# Patient Record
Sex: FEMALE | Race: WHITE | NOT HISPANIC OR LATINO | Employment: FULL TIME | URBAN - NONMETROPOLITAN AREA
[De-identification: names, ages, dates, MRNs, and addresses within clinical notes are randomized per-mention and may not be internally consistent; named-entity substitution may affect disease eponyms.]

---

## 2017-01-17 ENCOUNTER — LAB (OUTPATIENT)
Dept: ONCOLOGY | Facility: CLINIC | Age: 47
End: 2017-01-17

## 2017-01-17 DIAGNOSIS — C53.9 MALIGNANT NEOPLASM OF CERVIX, UNSPECIFIED SITE (HCC): Primary | ICD-10-CM

## 2017-01-17 LAB
ALBUMIN SERPL-MCNC: 4.7 G/DL (ref 3.5–5)
ALBUMIN/GLOB SERPL: 1.3 G/DL
ALP SERPL-CCNC: 61 U/L (ref 38–126)
ALT SERPL W P-5'-P-CCNC: 23 U/L (ref 9–52)
ANION GAP SERPL CALCULATED.3IONS-SCNC: 14 MMOL/L
AST SERPL-CCNC: 16 U/L (ref 5–40)
AUTO MIXED CELLS #: 0.3 10*3/UL (ref 0.1–1.5)
AUTO MIXED CELLS %: 6.1 % (ref 0.2–15.1)
BILIRUB SERPL-MCNC: 0.6 MG/DL (ref 0.2–1.3)
BUN BLD-MCNC: 14 MG/DL (ref 7–26)
BUN/CREAT SERPL: 15.6 (ref 7–25)
CALCIUM SPEC-SCNC: 10.5 MG/DL (ref 8.4–10.2)
CHLORIDE SERPL-SCNC: 105 MMOL/L (ref 98–107)
CO2 SERPL-SCNC: 26 MMOL/L (ref 22–30)
CREAT BLD-MCNC: 0.9 MG/DL (ref 0.7–1.4)
ERYTHROCYTE [DISTWIDTH] IN BLOOD BY AUTOMATED COUNT: 13.1 % (ref 11.5–14.5)
GFR SERPL CREATININE-BSD FRML MDRD: 68 ML/MIN/1.73
GLOBULIN UR ELPH-MCNC: 3.5 GM/DL
GLUCOSE BLD-MCNC: 100 MG/DL (ref 75–110)
HCT VFR BLD AUTO: 39.7 % (ref 37–47)
HGB BLD-MCNC: 12.2 G/DL (ref 12–16)
LYMPHOCYTES # BLD AUTO: 1.2 10*3/MM3 (ref 0.8–7)
LYMPHOCYTES NFR BLD AUTO: 29.3 % (ref 10–58.5)
MCH RBC QN AUTO: 29 PG (ref 27–31)
MCHC RBC AUTO-ENTMCNC: 30.7 G/DL (ref 33–37)
MCV RBC AUTO: 94.2 FL (ref 81–99)
NEUTROPHILS # BLD AUTO: 2.6 10*3/MM3 (ref 2–7.8)
NEUTROPHILS NFR BLD AUTO: 64.6 % (ref 37–92)
PLATELET # BLD AUTO: 187 10*3/MM3 (ref 130–400)
PMV BLD AUTO: 8.1 FL (ref 6–12)
POTASSIUM BLD-SCNC: 4.1 MMOL/L (ref 3.6–5)
PROT SERPL-MCNC: 8.2 G/DL (ref 6.3–8.2)
RBC # BLD AUTO: 4.21 10*6/MM3 (ref 4.2–5.4)
SODIUM BLD-SCNC: 145 MMOL/L (ref 137–145)
WBC NRBC COR # BLD: 4.1 10*3/MM3 (ref 4.8–10.8)

## 2017-01-17 PROCEDURE — 85025 COMPLETE CBC W/AUTO DIFF WBC: CPT | Performed by: INTERNAL MEDICINE

## 2017-01-17 PROCEDURE — 36415 COLL VENOUS BLD VENIPUNCTURE: CPT | Performed by: INTERNAL MEDICINE

## 2017-01-17 PROCEDURE — 80053 COMPREHEN METABOLIC PANEL: CPT | Performed by: INTERNAL MEDICINE

## 2017-03-03 ENCOUNTER — OFFICE VISIT (OUTPATIENT)
Dept: RADIATION ONCOLOGY | Facility: HOSPITAL | Age: 47
End: 2017-03-03

## 2017-03-03 VITALS
DIASTOLIC BLOOD PRESSURE: 76 MMHG | HEIGHT: 68 IN | WEIGHT: 180 LBS | SYSTOLIC BLOOD PRESSURE: 126 MMHG | BODY MASS INDEX: 27.28 KG/M2

## 2017-03-03 DIAGNOSIS — C53.9 MALIGNANT NEOPLASM OF CERVIX, UNSPECIFIED SITE (HCC): Primary | ICD-10-CM

## 2017-03-03 DIAGNOSIS — Z87.891 FORMER SMOKER: ICD-10-CM

## 2017-03-03 DIAGNOSIS — Z92.3 HISTORY OF RADIATION THERAPY: ICD-10-CM

## 2017-03-03 PROCEDURE — G0123 SCREEN CERV/VAG THIN LAYER: HCPCS | Performed by: PHYSICIAN ASSISTANT

## 2017-03-03 RX ORDER — TRAZODONE HYDROCHLORIDE 150 MG/1
50 TABLET ORAL NIGHTLY
COMMUNITY

## 2017-03-06 LAB
GEN CATEG CVX/VAG CYTO-IMP: NORMAL
LAB AP CASE REPORT: NORMAL
LAB AP GYN ADDITIONAL INFORMATION: NORMAL
Lab: NORMAL
PATH INTERP SPEC-IMP: NORMAL
STAT OF ADQ CVX/VAG CYTO-IMP: NORMAL

## 2017-03-13 ENCOUNTER — OFFICE VISIT (OUTPATIENT)
Dept: PRIMARY CARE CLINIC | Age: 47
End: 2017-03-13
Payer: COMMERCIAL

## 2017-03-13 VITALS
WEIGHT: 177 LBS | HEART RATE: 71 BPM | OXYGEN SATURATION: 94 % | DIASTOLIC BLOOD PRESSURE: 74 MMHG | TEMPERATURE: 99 F | RESPIRATION RATE: 18 BRPM | SYSTOLIC BLOOD PRESSURE: 112 MMHG | BODY MASS INDEX: 26.83 KG/M2 | HEIGHT: 68 IN

## 2017-03-13 DIAGNOSIS — G62.9 NEUROPATHY: ICD-10-CM

## 2017-03-13 DIAGNOSIS — Z92.21 HISTORY OF CHEMOTHERAPY: Primary | ICD-10-CM

## 2017-03-13 DIAGNOSIS — R53.83 FATIGUE, UNSPECIFIED TYPE: ICD-10-CM

## 2017-03-13 DIAGNOSIS — R63.5 WEIGHT GAIN: ICD-10-CM

## 2017-03-13 DIAGNOSIS — Z85.41 HISTORY OF CERVICAL CANCER: ICD-10-CM

## 2017-03-13 DIAGNOSIS — Z92.3 HISTORY OF RADIATION THERAPY: ICD-10-CM

## 2017-03-13 LAB
ALBUMIN SERPL-MCNC: 4.8 G/DL (ref 3.5–5.2)
ALP BLD-CCNC: 59 U/L (ref 35–104)
ALT SERPL-CCNC: 14 U/L (ref 5–33)
ANION GAP SERPL CALCULATED.3IONS-SCNC: 15 MMOL/L (ref 7–19)
AST SERPL-CCNC: 13 U/L (ref 5–32)
BASOPHILS ABSOLUTE: 0 K/UL (ref 0–0.2)
BASOPHILS RELATIVE PERCENT: 0.2 % (ref 0–1)
BILIRUB SERPL-MCNC: 0.6 MG/DL (ref 0.2–1.2)
BUN BLDV-MCNC: 15 MG/DL (ref 6–20)
CALCIUM SERPL-MCNC: 10.1 MG/DL (ref 8.6–10)
CHLORIDE BLD-SCNC: 99 MMOL/L (ref 98–111)
CO2: 25 MMOL/L (ref 22–29)
CREAT SERPL-MCNC: 1 MG/DL (ref 0.5–0.9)
EOSINOPHILS ABSOLUTE: 0.2 K/UL (ref 0–0.6)
EOSINOPHILS RELATIVE PERCENT: 3.6 % (ref 0–5)
GFR NON-AFRICAN AMERICAN: >60
GLOBULIN: 2.7 G/DL
GLUCOSE BLD-MCNC: 95 MG/DL (ref 74–109)
HCT VFR BLD CALC: 37.5 % (ref 37–47)
HEMOGLOBIN: 12.6 G/DL (ref 12–16)
LYMPHOCYTES ABSOLUTE: 1.3 K/UL (ref 1.1–4.5)
LYMPHOCYTES RELATIVE PERCENT: 28.8 % (ref 20–40)
MCH RBC QN AUTO: 31.1 PG (ref 27–31)
MCHC RBC AUTO-ENTMCNC: 33.6 G/DL (ref 33–37)
MCV RBC AUTO: 92.6 FL (ref 81–99)
MONOCYTES ABSOLUTE: 0.3 K/UL (ref 0–0.9)
MONOCYTES RELATIVE PERCENT: 6.6 % (ref 0–10)
NEUTROPHILS ABSOLUTE: 2.7 K/UL (ref 1.5–7.5)
NEUTROPHILS RELATIVE PERCENT: 60.8 % (ref 50–65)
PDW BLD-RTO: 13 % (ref 11.5–14.5)
PLATELET # BLD: 202 K/UL (ref 130–400)
PMV BLD AUTO: 9.6 FL (ref 7.4–10.4)
POTASSIUM SERPL-SCNC: 4.4 MMOL/L (ref 3.5–5)
RBC # BLD: 4.05 M/UL (ref 4.2–5.4)
SODIUM BLD-SCNC: 139 MMOL/L (ref 136–145)
TOTAL PROTEIN: 7.5 G/DL (ref 6.6–8.7)
TSH SERPL DL<=0.05 MIU/L-ACNC: 0.69 UIU/ML (ref 0.27–4.2)
VITAMIN B-12: 333 PG/ML (ref 211–946)
VITAMIN D 25-HYDROXY: 26.1 NG/ML
WBC # BLD: 4.4 K/UL (ref 4.8–10.8)

## 2017-03-13 PROCEDURE — 36415 COLL VENOUS BLD VENIPUNCTURE: CPT | Performed by: FAMILY MEDICINE

## 2017-03-13 PROCEDURE — 99214 OFFICE O/P EST MOD 30 MIN: CPT | Performed by: FAMILY MEDICINE

## 2017-03-15 RX ORDER — ERGOCALCIFEROL (VITAMIN D2) 1250 MCG
50000 CAPSULE ORAL WEEKLY
Qty: 4 CAPSULE | Refills: 3 | Status: SHIPPED | OUTPATIENT
Start: 2017-03-15

## 2017-03-15 ASSESSMENT — ENCOUNTER SYMPTOMS
BACK PAIN: 0
WHEEZING: 0
EYE DISCHARGE: 0
NAUSEA: 0
COUGH: 0
DIARRHEA: 0
ABDOMINAL PAIN: 0
COLOR CHANGE: 0
VOMITING: 0

## 2017-09-20 ENCOUNTER — HOSPITAL ENCOUNTER (OUTPATIENT)
Dept: RADIATION ONCOLOGY | Facility: HOSPITAL | Age: 47
Discharge: HOME | End: 2017-09-20
Attending: RADIOLOGY

## 2017-09-20 ENCOUNTER — HOSPITAL ENCOUNTER (OUTPATIENT)
Dept: RADIATION ONCOLOGY | Facility: HOSPITAL | Age: 47
Setting detail: RADIATION/ONCOLOGY SERIES
End: 2017-09-20

## 2017-09-20 ENCOUNTER — OFFICE VISIT (OUTPATIENT)
Dept: RADIATION ONCOLOGY | Facility: HOSPITAL | Age: 47
End: 2017-09-20

## 2017-09-20 VITALS
DIASTOLIC BLOOD PRESSURE: 76 MMHG | WEIGHT: 187 LBS | SYSTOLIC BLOOD PRESSURE: 114 MMHG | BODY MASS INDEX: 28.34 KG/M2 | HEIGHT: 68 IN

## 2017-09-20 DIAGNOSIS — C53.9 MALIGNANT NEOPLASM OF CERVIX, UNSPECIFIED SITE (HCC): Primary | ICD-10-CM

## 2017-09-20 DIAGNOSIS — Z85.41 ENCOUNTER FOR FOLLOW-UP SURVEILLANCE OF CERVICAL CANCER: ICD-10-CM

## 2017-09-20 DIAGNOSIS — C53.9 MALIGNANT NEOPLASM OF OTHER SPECIFIED SITES OF CERVIX: Primary | ICD-10-CM

## 2017-09-20 DIAGNOSIS — Z92.3 HISTORY OF RADIATION THERAPY: ICD-10-CM

## 2017-09-20 DIAGNOSIS — Z87.891 FORMER SMOKER: ICD-10-CM

## 2017-09-20 DIAGNOSIS — Z08 ENCOUNTER FOR FOLLOW-UP SURVEILLANCE OF CERVICAL CANCER: ICD-10-CM

## 2017-09-20 PROCEDURE — G0123 SCREEN CERV/VAG THIN LAYER: HCPCS | Performed by: RADIOLOGY

## 2017-09-20 PROCEDURE — G0463 HOSPITAL OUTPT CLINIC VISIT: HCPCS | Performed by: RADIOLOGY

## 2017-09-25 ENCOUNTER — LAB (OUTPATIENT)
Dept: LAB | Facility: HOSPITAL | Age: 47
End: 2017-09-25

## 2017-09-25 ENCOUNTER — OFFICE VISIT (OUTPATIENT)
Dept: ONCOLOGY | Facility: CLINIC | Age: 47
End: 2017-09-25

## 2017-09-25 VITALS
HEART RATE: 118 BPM | DIASTOLIC BLOOD PRESSURE: 76 MMHG | HEIGHT: 68 IN | RESPIRATION RATE: 16 BRPM | WEIGHT: 188.8 LBS | BODY MASS INDEX: 28.61 KG/M2 | TEMPERATURE: 97 F | OXYGEN SATURATION: 90 % | SYSTOLIC BLOOD PRESSURE: 118 MMHG

## 2017-09-25 DIAGNOSIS — C53.9 MALIGNANT NEOPLASM OF CERVIX, UNSPECIFIED SITE (HCC): Primary | ICD-10-CM

## 2017-09-25 DIAGNOSIS — Z00.00 VISIT FOR PREVENTIVE HEALTH EXAMINATION: Primary | ICD-10-CM

## 2017-09-25 DIAGNOSIS — C53.9 CERVICAL CANCER, FIGO STAGE IIIA (HCC): ICD-10-CM

## 2017-09-25 DIAGNOSIS — C79.82 CERVICAL CANCER, FIGO STAGE IIIA (HCC): ICD-10-CM

## 2017-09-25 DIAGNOSIS — C53.9 MALIGNANT NEOPLASM OF OTHER SPECIFIED SITES OF CERVIX: ICD-10-CM

## 2017-09-25 LAB
ALBUMIN SERPL-MCNC: 4.6 G/DL (ref 3.5–5)
ALBUMIN/GLOB SERPL: 1.6 G/DL (ref 1.1–2.5)
ALP SERPL-CCNC: 47 U/L (ref 24–120)
ALT SERPL W P-5'-P-CCNC: 25 U/L (ref 0–54)
ANION GAP SERPL CALCULATED.3IONS-SCNC: 12 MMOL/L (ref 4–13)
AST SERPL-CCNC: 18 U/L (ref 7–45)
BASOPHILS # BLD AUTO: 0.02 10*3/MM3 (ref 0–0.2)
BASOPHILS NFR BLD AUTO: 0.4 % (ref 0–2)
BILIRUB SERPL-MCNC: 0.4 MG/DL (ref 0.1–1)
BUN BLD-MCNC: 11 MG/DL (ref 5–21)
BUN/CREAT SERPL: 15.5 (ref 7–25)
CALCIUM SPEC-SCNC: 9.7 MG/DL (ref 8.4–10.4)
CHLORIDE SERPL-SCNC: 104 MMOL/L (ref 98–110)
CO2 SERPL-SCNC: 27 MMOL/L (ref 24–31)
CREAT BLD-MCNC: 0.71 MG/DL (ref 0.5–1.4)
DEPRECATED RDW RBC AUTO: 43.6 FL (ref 40–54)
EOSINOPHIL # BLD AUTO: 0.06 10*3/MM3 (ref 0–0.7)
EOSINOPHIL NFR BLD AUTO: 1.3 % (ref 0–4)
ERYTHROCYTE [DISTWIDTH] IN BLOOD BY AUTOMATED COUNT: 12.9 % (ref 12–15)
GFR SERPL CREATININE-BSD FRML MDRD: 89 ML/MIN/1.73
GLOBULIN UR ELPH-MCNC: 2.9 GM/DL
GLUCOSE BLD-MCNC: 82 MG/DL (ref 70–100)
HCT VFR BLD AUTO: 37 % (ref 37–47)
HGB BLD-MCNC: 12.3 G/DL (ref 12–16)
HOLD SPECIMEN: NORMAL
IMM GRANULOCYTES # BLD: 0 10*3/MM3 (ref 0–0.03)
IMM GRANULOCYTES NFR BLD: 0 % (ref 0–5)
LYMPHOCYTES # BLD AUTO: 1.54 10*3/MM3 (ref 0.72–4.86)
LYMPHOCYTES NFR BLD AUTO: 34 % (ref 15–45)
MCH RBC QN AUTO: 30.5 PG (ref 28–32)
MCHC RBC AUTO-ENTMCNC: 33.2 G/DL (ref 33–36)
MCV RBC AUTO: 91.8 FL (ref 82–98)
MONOCYTES # BLD AUTO: 0.37 10*3/MM3 (ref 0.19–1.3)
MONOCYTES NFR BLD AUTO: 8.2 % (ref 4–12)
NEUTROPHILS # BLD AUTO: 2.54 10*3/MM3 (ref 1.87–8.4)
NEUTROPHILS NFR BLD AUTO: 56.1 % (ref 39–78)
PLATELET # BLD AUTO: 159 10*3/MM3 (ref 130–400)
PMV BLD AUTO: 9.5 FL (ref 6–12)
POTASSIUM BLD-SCNC: 3.9 MMOL/L (ref 3.5–5.3)
PROT SERPL-MCNC: 7.5 G/DL (ref 6.3–8.7)
RBC # BLD AUTO: 4.03 10*6/MM3 (ref 4.2–5.4)
SODIUM BLD-SCNC: 143 MMOL/L (ref 135–145)
WBC NRBC COR # BLD: 4.53 10*3/MM3 (ref 4.8–10.8)

## 2017-09-25 PROCEDURE — 80053 COMPREHEN METABOLIC PANEL: CPT

## 2017-09-25 PROCEDURE — 85025 COMPLETE CBC W/AUTO DIFF WBC: CPT

## 2017-09-25 PROCEDURE — 99213 OFFICE O/P EST LOW 20 MIN: CPT | Performed by: INTERNAL MEDICINE

## 2017-09-25 PROCEDURE — 36415 COLL VENOUS BLD VENIPUNCTURE: CPT

## 2017-09-29 NOTE — PROGRESS NOTES
[facility]  HEMATOLOGY & ONCOLOGY    Cancer Staging Information:  No matching staging information was found for the patient.      Subjective     VISIT DIAGNOSIS:   Encounter Diagnoses   Name Primary?   • Visit for preventive health examination Yes   • Cervical cancer, FIGO stage IIIA        REASON FOR VISIT:     Chief Complaint   Patient presents with   • Cervical ca f/u        HEMATOLOGY / ONCOLOGY HISTORY:   Oncology/Hematology History    Patient with a history of Stage IIB (T2a N1 M0) carcinoma of the cervix, who is status post radical robotic hysterectomy and bilateral pelvic lymphadenectomy followed by adjuvant radiation therapy to the pelvis + cisplatin chemo as a radiosensitizer. Pt was initially admitted to [facility] on 12/18/2013 from Dr. Hernandez office due to a 4 days history of profuse vaginal bleeding. A CT scan of the abd/pelvis revealed a large necrotic appearing mass centered at the cervix measuring 7.2cm. Cervical biopsy positive for adenocarcinoma. The patient was referred to Dr. Machado at [facility], with exam under anesthesia performed. A PET/CT on 12/20/14 found a diffusely enlarged cervix with no findings of local regional metastatic disease. There was questionable activity within the colon. She underwent a colonoscopy with polypectomy on 1/15/14, with pathology findings consistent with tubular adenomas. No evidence of high-grade dysplasia. She was taken to the OR at [facility] on 2/11/14 for radical robotic hysterectomy and bilateral pelvic lymphadenectomy and cystoscopy. 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 Extensive angiolymphatic invasion present. Negative for perineural invasion. Vaginal cuff margin was focally positive. One of 5 left pelvic lymph nodes and 1 of 9 right pelvic lymph nodes were positive. She received 5040 cGy to  the pelvis along with 3000 cGy brachytherapy boost to vaginal cuff. She competed radiation therapy on 14. The patient experienced post-treatment complications of vaginal necrosis requiring 30 HBO therapy treatments.         Cervical cancer     Initial Diagnosis     Cervical cancer         2013 Surgery     Patient Name: YESI BOWMAN  MR#: [id number]  : 1970  Gender: F   Ordering Physician: Ning Hernandez M.D.  Copy To:    Accession #: [id number]  Location: [address] [place] [address]  Encounter #: [id number]  Collected: 2013  Received: 2013  Reported: 2013    Clinical Diagnosis and History  Pre-operative diagnosis: Menorrhagia.        Final Diagnosis  Cervical biopsy:        Atypical papillary neoplastic structure, favor  adenocarcinoma (see microscopic description).     Electronically Signed Out By  Kit Worrell M.D.         2014 - 2014 Radiation     Radiation OncologyTreatment Course:  Yesi Bowman received 5040 cGy in 28 fractions to cervix and 3000 cGy via HDRs x 5.           2014 Procedure     Pap Smear:  OTHER INTERPRETATIONS:  Reactive changes associated with radiation.    FINAL CYTOLOGIC DIAGNOSIS:  Negative for Intraepithelial lesions or malignancy         2014 Imaging     CT Abd/Pelvis:  IMPRESSION-  1. No CT evidence of neoplastic disease.  2. 1.0 cm nodular pleural thickening, right lung base posteromedially  near the spine, stable compared to the  examination.  3. Changes from hysterectomy. Mild fluid or induration of the presacral  fat, likely related to previous therapy.  4. Mild circumferential bowel wall prominence of the sigmoid colon which  may be artifactual related to lack of distention. Hypertrophy related to  mild diverticulosis may also be considered. Minimal colitis changes  would be difficult to totally exclude, however there are no secondary  signs of inflammation. Correlate with patient's symptomatology.  5. Right-sided sacralization of the L5  vertebral body         11/12/2014 Imaging     CT Abd/Pelvis:  IMPRESSION-   1. Diverticulosis. No acute intra-abdominal or pelvic abnormalities  identified.   2. Incidental note is made of sacralization of the right side of L5.    CT Chest:  IMPRESSION- Negative enhanced CT of the chest.             12/30/2014 Imaging     CT Abd/Pelvis:  IMPRESSION-  1. Changes from hysterectomy. Mild induration of the presacral and  perirectal fat with a small amount of fluid in the presacral space,  unchanged. Suspect posttherapy in nature.  2. Mildly prominent pelvic lymph node, left iliac chain. Metastatic  lymphadenopathy not excluded. PET CT characterization suggested if  clinically indicated.  3. No CT evidence of an acute intra-abdominal/pelvic pathological  process, otherwise.             12/31/2014 Imaging     PET/CT:  IMPRESSION-  1. Mild increased PET scan activity identified in the left pelvic lymph  node with a maximum intensity of 1.3 SUV, likely reactive, not typical  of neoplastic disease.  2. Hypermetabolic change in the cervix compatible with the patient's  history of cervical cancer.  3. No evidence of distal metastases         4/10/2015 Procedure     Pap Smear:  OTHER INTERPRETATIONS:  Reactive changes associated with radiation.  Inflammation.  Metaplasia.    FINAL CYTOLOGIC DIAGNOSIS:  Negative for Intraepithelial lesions or malignancy         11/11/2015 Imaging     CT Chest:  IMPRESSION-   1. Unremarkable CT of the chest.    CT Abd/Pelvis:      IMPRESSION-   1. No evidence of local recurrence or metastasis.   2. Soft tissue density overlying the RIGHT gluteus muscle. This is  likely from a recent injection. If no recent injection or trauma has  occurred, and ultrasound of this area could be obtained to assess for a  solid lesion.             2/15/2016 Procedure     Pap Smear:  OTHER INTERPRETATIONS:                           Reactive changes associated with radiation.                           Metaplasia.                                                       FINAL CYTOLOGIC DIAGNOSIS:                           Negative for Intraepithelial lesions or malignancy         2016 Imaging     CT Abd/Pelvis:  IMPRESSION-   Prior hysterectomy. Thickening of the wall of the sigmoid and rectum. No  other abnormalities are identified.             2016 Imaging     CT Abd/Pelvis:  Summary-  1. Acute appendicitis.         2016 Procedure     Pap Smear:  Interpretation   Negative for intraepithelial lesion or malignancy              3/3/2017 Procedure     Pap Smear:  Interpretation   Negative for intraepithelial lesion or malignancy                     INTERVAL HISTORY  Patient ID: Yesi Bowman is a 46 y.o. year old female stage II cervical ca. Chronic dyspareunia due to stenosis, no dysuria, hematuria, overt bleeding or mass noted. She has not been to a gynecologist in a long time. Denies any MICHEAL or night sweats.    Past Medical History:   Past Medical History:   Diagnosis Date   • Alcoholism    • Cervical cancer    • Drug therapy    • History of radiation therapy     5040 cGy to cervix + 3000 cGy (HDRs x 5)   • Hx of radiation therapy    • Substance abuse      Past Surgical History:   Past Surgical History:   Procedure Laterality Date   • APPENDECTOMY     • CERVICAL BIOPSY     •  SECTION     • COLONOSCOPY     • CYSTOSCOPY      Diagnostic   • DILATATION AND CURETTAGE     • HYSTERECTOMY     • KNEE SURGERY     • OOPHORECTOMY     • PELVIC EXAMINATION UNDER ANESTHESIA     • TONSILLECTOMY     • TOTAL LAPAROSCOPIC HYSTERECTOMY WITH DAVINCI ROBOT       Social History:   Social History     Social History   • Marital status:      Spouse name: N/A   • Number of children: N/A   • Years of education: N/A     Occupational History   • Not on file.     Social History Main Topics   • Smoking status: Former Smoker     Packs/day: 0.50     Types: Cigarettes     Quit date: 2016   • Smokeless tobacco: Never Used   • Alcohol  "use No   • Drug use: No   • Sexual activity: No     Other Topics Concern   • Not on file     Social History Narrative     Family History:   Family History   Problem Relation Age of Onset   • Skin cancer Mother    • Cancer Maternal Grandmother      Malignancy   • Cancer Paternal Grandmother      Malignancy       Review of Systems   Constitutional: Negative.    HENT: Negative.    Eyes: Negative.    Respiratory: Negative.    Cardiovascular: Negative.    Gastrointestinal: Negative.    Endocrine: Negative.    Genitourinary: Negative.    Musculoskeletal: Negative.    Skin: Negative.    Allergic/Immunologic: Negative.    Neurological: Negative.    Hematological: Negative.    Psychiatric/Behavioral: Negative.         Performance Status:  Asymptomatic    Medications:    Current Outpatient Prescriptions   Medication Sig Dispense Refill   • atomoxetine (STRATTERA) 40 MG capsule Take 40 mg by mouth Daily.     • ibuprofen (ADVIL,MOTRIN) 600 MG tablet Take 600 mg by mouth.     • sertraline (ZOLOFT) 100 MG tablet Take 100 mg by mouth Daily.     • traZODone (DESYREL) 150 MG tablet Take 150 mg by mouth Every Night.       No current facility-administered medications for this visit.        ALLERGIES:  No Known Allergies    Objective      Vitals:    09/25/17 1444   BP: 118/76   Pulse: 118   Resp: 16   Temp: 97 °F (36.1 °C)   TempSrc: Tympanic   SpO2: 90%   Weight: 188 lb 12.8 oz (85.6 kg)   Height: 68\" (172.7 cm)         No flowsheet data found.      Physical Exam  General Appearance: Patient is awake, alert, oriented and in no acute distress. Patient is welldeveloped, wellnourished, and appears stated age.  HEENT: Normocephalic. Sclerae clear, conjunctiva pink, extraocular movements intact, pupils, round, reactive to light and  accommodation. Mouth and throat are clear with moist oral mucosa.  NECK: Supple, no jugular venous distention, thyroid not enlarged.  LYMPH: No cervical, supraclavicular, axillary, or inguinal " lymphadenopathy.  CHEST: Equal bilateral expansion, AP  diameter normal, resonant percussion note  LUNGS: Good air movement, no rales, rhonchi, rubs or wheezes with auscultation  CARDIO: Regular sinus rhythm, no murmurs, gallops or rubs.  ABDOMEN: Nondistended, soft, No tenderness, no guarding, no rebound, No hepatosplenomegaly. No abdominal masses. Bowel sounds positive. No hernia  GENITALIA: Not examined.  BREASTS: Not examined.  MUSKEL: No joint swelling, decreased motion, or inflammation  EXTREMS: No edema, clubbing, cyanosis, No varicose veins.  NEURO: Grossly nonfocal, Gait is coordinated and smooth, Cognition is preserved.  SKIN: No rashes, no ecchymoses, no petechia.  PSYCH: Oriented to time, place and person. Memory is preserved. Mood and affect appear normal    RECENT LABS:  Lab on 09/25/2017   Component Date Value Ref Range Status   • Glucose 09/25/2017 82  70 - 100 mg/dL Final   • BUN 09/25/2017 11  5 - 21 mg/dL Final   • Creatinine 09/25/2017 0.71  0.50 - 1.40 mg/dL Final   • Sodium 09/25/2017 143  135 - 145 mmol/L Final   • Potassium 09/25/2017 3.9  3.5 - 5.3 mmol/L Final   • Chloride 09/25/2017 104  98 - 110 mmol/L Final   • CO2 09/25/2017 27.0  24.0 - 31.0 mmol/L Final   • Calcium 09/25/2017 9.7  8.4 - 10.4 mg/dL Final   • Total Protein 09/25/2017 7.5  6.3 - 8.7 g/dL Final   • Albumin 09/25/2017 4.60  3.50 - 5.00 g/dL Final   • ALT (SGPT) 09/25/2017 25  0 - 54 U/L Final   • AST (SGOT) 09/25/2017 18  7 - 45 U/L Final   • Alkaline Phosphatase 09/25/2017 47  24 - 120 U/L Final   • Total Bilirubin 09/25/2017 0.4  0.1 - 1.0 mg/dL Final   • eGFR Non  Amer 09/25/2017 89  >60 mL/min/1.73 Final   • Globulin 09/25/2017 2.9  gm/dL Final   • A/G Ratio 09/25/2017 1.6  1.1 - 2.5 g/dL Final   • BUN/Creatinine Ratio 09/25/2017 15.5  7.0 - 25.0 Final   • Anion Gap 09/25/2017 12.0  4.0 - 13.0 mmol/L Final   • WBC 09/25/2017 4.53* 4.80 - 10.80 10*3/mm3 Final   • RBC 09/25/2017 4.03* 4.20 - 5.40 10*6/mm3 Final    • Hemoglobin 09/25/2017 12.3  12.0 - 16.0 g/dL Final   • Hematocrit 09/25/2017 37.0  37.0 - 47.0 % Final   • MCV 09/25/2017 91.8  82.0 - 98.0 fL Final   • MCH 09/25/2017 30.5  28.0 - 32.0 pg Final   • MCHC 09/25/2017 33.2  33.0 - 36.0 g/dL Final   • RDW 09/25/2017 12.9  12.0 - 15.0 % Final   • RDW-SD 09/25/2017 43.6  40.0 - 54.0 fl Final   • MPV 09/25/2017 9.5  6.0 - 12.0 fL Final   • Platelets 09/25/2017 159  130 - 400 10*3/mm3 Final   • Neutrophil % 09/25/2017 56.1  39.0 - 78.0 % Final   • Lymphocyte % 09/25/2017 34.0  15.0 - 45.0 % Final   • Monocyte % 09/25/2017 8.2  4.0 - 12.0 % Final   • Eosinophil % 09/25/2017 1.3  0.0 - 4.0 % Final   • Basophil % 09/25/2017 0.4  0.0 - 2.0 % Final   • Immature Grans % 09/25/2017 0.0  0.0 - 5.0 % Final   • Neutrophils, Absolute 09/25/2017 2.54  1.87 - 8.40 10*3/mm3 Final   • Lymphocytes, Absolute 09/25/2017 1.54  0.72 - 4.86 10*3/mm3 Final   • Monocytes, Absolute 09/25/2017 0.37  0.19 - 1.30 10*3/mm3 Final   • Eosinophils, Absolute 09/25/2017 0.06  0.00 - 0.70 10*3/mm3 Final   • Basophils, Absolute 09/25/2017 0.02  0.00 - 0.20 10*3/mm3 Final   • Immature Grans, Absolute 09/25/2017 0.00  0.00 - 0.03 10*3/mm3 Final   • Extra Tube 09/25/2017 Hold for add-ons.   Final    Auto resulted.   Office Visit on 09/20/2017   Component Date Value Ref Range Status   • Case Report 09/20/2017    Final                    Value:Gynecologic Cytology Report                       Case: [id number]                                  Authorizing Provider:  Juvencio Braden III, MD     Collected:           09/20/2017 04:24 PM          Ordering Location:     [facility]            Received:            09/21/2017 07:54 AM          First Screen:          Barbra Valdez                                                               Specimen:    Liquid-Based Pap, Screening, Cervix                                                       • Interpretation 09/20/2017 Negative for intraepithelial lesion or  malignancy    Final   • General Categorization 09/20/2017 Within normal limits   Final   • Specimen Adequacy 09/20/2017 Satisfactory for evaluation   Final   • Additional Information 09/20/2017    Final                    Value:This result contains rich text formatting which cannot be displayed here.       RADIOLOGY:  No results found.         Assessment/Plan  Yesi Bowman is a 46 y.o. year old female stage IIb well differentiated cervical adenocarcinoma who is status post radical robotic hysterectomy and bilateral pelvic lymphadenectomy followed by adjuvant radiation therapy to the pelvis + cisplatin chemo as a radiosensitizer. She is currently DM. Advised yearly gyne exam. Referred back to Dr Hernandez. Also due for mammogram. Ordered.    Patient Active Problem List   Diagnosis   • Cervical cancer   • History of radiation therapy            Danyell Ramey MD    9/25/2017    2:07 PM

## 2017-10-31 PROBLEM — Z85.41 ENCOUNTER FOR FOLLOW-UP SURVEILLANCE OF CERVICAL CANCER: Status: ACTIVE | Noted: 2017-10-31

## 2017-10-31 PROBLEM — Z87.891 FORMER SMOKER: Status: ACTIVE | Noted: 2017-10-31

## 2017-10-31 PROBLEM — Z08 ENCOUNTER FOR FOLLOW-UP SURVEILLANCE OF CERVICAL CANCER: Status: ACTIVE | Noted: 2017-10-31

## 2018-02-15 ENCOUNTER — HOSPITAL ENCOUNTER (OUTPATIENT)
Dept: RADIATION ONCOLOGY | Facility: HOSPITAL | Age: 48
Setting detail: RADIATION/ONCOLOGY SERIES
End: 2018-02-15

## 2018-02-15 ENCOUNTER — OFFICE VISIT (OUTPATIENT)
Dept: RADIATION ONCOLOGY | Facility: HOSPITAL | Age: 48
End: 2018-02-15

## 2018-02-15 VITALS
BODY MASS INDEX: 28.23 KG/M2 | SYSTOLIC BLOOD PRESSURE: 112 MMHG | WEIGHT: 186.3 LBS | HEIGHT: 68 IN | DIASTOLIC BLOOD PRESSURE: 60 MMHG

## 2018-02-15 DIAGNOSIS — R10.84 GENERALIZED ABDOMINAL PAIN: ICD-10-CM

## 2018-02-15 DIAGNOSIS — Z90.710 PAP SMEAR OF VAGINA WITH PREVIOUS HYSTERECTOMY FOR CANCER: ICD-10-CM

## 2018-02-15 DIAGNOSIS — Z85.41 ENCOUNTER FOR FOLLOW-UP SURVEILLANCE OF CERVICAL CANCER: ICD-10-CM

## 2018-02-15 DIAGNOSIS — Z08 ENCOUNTER FOR FOLLOW-UP SURVEILLANCE OF CERVICAL CANCER: ICD-10-CM

## 2018-02-15 DIAGNOSIS — Z87.891 FORMER SMOKER: ICD-10-CM

## 2018-02-15 DIAGNOSIS — R19.7 DIARRHEA, UNSPECIFIED TYPE: ICD-10-CM

## 2018-02-15 DIAGNOSIS — Z08 PAP SMEAR OF VAGINA WITH PREVIOUS HYSTERECTOMY FOR CANCER: ICD-10-CM

## 2018-02-15 DIAGNOSIS — R14.0 GENERALIZED BLOATING: ICD-10-CM

## 2018-02-15 DIAGNOSIS — C53.9 MALIGNANT NEOPLASM OF CERVIX, UNSPECIFIED SITE (HCC): Primary | ICD-10-CM

## 2018-02-15 DIAGNOSIS — Z92.3 HISTORY OF RADIATION THERAPY: ICD-10-CM

## 2018-02-15 PROCEDURE — G0123 SCREEN CERV/VAG THIN LAYER: HCPCS | Performed by: PHYSICIAN ASSISTANT

## 2018-02-15 PROCEDURE — G0463 HOSPITAL OUTPT CLINIC VISIT: HCPCS | Performed by: RADIOLOGY

## 2018-02-19 LAB
GEN CATEG CVX/VAG CYTO-IMP: NORMAL
LAB AP CASE REPORT: NORMAL
LAB AP GYN ADDITIONAL INFORMATION: NORMAL
LAB AP GYN OTHER FINDINGS: NORMAL
Lab: NORMAL
PATH INTERP SPEC-IMP: NORMAL
STAT OF ADQ CVX/VAG CYTO-IMP: NORMAL

## 2018-02-26 DIAGNOSIS — R19.7 DIARRHEA, UNSPECIFIED TYPE: ICD-10-CM

## 2018-02-26 DIAGNOSIS — R14.0 BLOATING: Primary | ICD-10-CM

## 2018-02-26 DIAGNOSIS — R10.84 GENERALIZED ABDOMINAL PAIN: ICD-10-CM

## 2018-02-26 DIAGNOSIS — Z85.41 HX OF CERVICAL CANCER: ICD-10-CM

## 2018-02-26 NOTE — PROGRESS NOTES
[facility]  MD Zee Chavira, MARIO, DARYL  ___________________________________________________________  [facility]  Department of Radiation Oncology  [address] [place] [id number]  Office:  [phone]  Fax: [phone]      02/26/2018  Pt has continued bloating and nausea. Hx cervical cancer. WIll refer to GI.   Zee Ornelas PA-C

## 2018-02-28 ENCOUNTER — APPOINTMENT (OUTPATIENT)
Dept: CT IMAGING | Facility: HOSPITAL | Age: 48
End: 2018-02-28

## 2018-02-28 ENCOUNTER — HOSPITAL ENCOUNTER (EMERGENCY)
Facility: HOSPITAL | Age: 48
Discharge: HOME | End: 2018-02-28
Admitting: NURSE PRACTITIONER

## 2018-02-28 ENCOUNTER — OFFICE VISIT (OUTPATIENT)
Dept: GASTROENTEROLOGY | Facility: CLINIC | Age: 48
End: 2018-02-28

## 2018-02-28 VITALS
OXYGEN SATURATION: 98 % | HEIGHT: 68 IN | BODY MASS INDEX: 27.43 KG/M2 | HEART RATE: 80 BPM | TEMPERATURE: 97.8 F | WEIGHT: 181 LBS | SYSTOLIC BLOOD PRESSURE: 118 MMHG | DIASTOLIC BLOOD PRESSURE: 60 MMHG

## 2018-02-28 VITALS
WEIGHT: 180 LBS | HEART RATE: 82 BPM | OXYGEN SATURATION: 99 % | HEIGHT: 68 IN | SYSTOLIC BLOOD PRESSURE: 115 MMHG | DIASTOLIC BLOOD PRESSURE: 80 MMHG | BODY MASS INDEX: 27.28 KG/M2 | TEMPERATURE: 97.5 F | RESPIRATION RATE: 14 BRPM

## 2018-02-28 DIAGNOSIS — E66.9 OBESITY, UNSPECIFIED OBESITY SEVERITY, UNSPECIFIED OBESITY TYPE: ICD-10-CM

## 2018-02-28 DIAGNOSIS — R19.7 DIARRHEA, UNSPECIFIED TYPE: ICD-10-CM

## 2018-02-28 DIAGNOSIS — R11.2 NAUSEA AND VOMITING, INTRACTABILITY OF VOMITING NOT SPECIFIED, UNSPECIFIED VOMITING TYPE: ICD-10-CM

## 2018-02-28 DIAGNOSIS — R19.7 DIARRHEA, UNSPECIFIED TYPE: Primary | ICD-10-CM

## 2018-02-28 DIAGNOSIS — N30.00 ACUTE CYSTITIS WITHOUT HEMATURIA: ICD-10-CM

## 2018-02-28 DIAGNOSIS — R10.84 GENERALIZED ABDOMINAL PAIN: Primary | ICD-10-CM

## 2018-02-28 DIAGNOSIS — Z78.9 NONSMOKER: ICD-10-CM

## 2018-02-28 LAB
ALBUMIN SERPL-MCNC: 4.5 G/DL (ref 3.5–5)
ALBUMIN/GLOB SERPL: 1.4 G/DL (ref 1.1–2.5)
ALP SERPL-CCNC: 55 U/L (ref 24–120)
ALT SERPL W P-5'-P-CCNC: 17 U/L (ref 0–54)
ANION GAP SERPL CALCULATED.3IONS-SCNC: 12 MMOL/L (ref 4–13)
APTT PPP: 25.4 SECONDS (ref 24.1–34.8)
AST SERPL-CCNC: 16 U/L (ref 7–45)
BACTERIA UR QL AUTO: ABNORMAL /HPF
BASOPHILS # BLD AUTO: 0.02 10*3/MM3 (ref 0–0.2)
BASOPHILS NFR BLD AUTO: 0.3 % (ref 0–2)
BILIRUB SERPL-MCNC: 0.8 MG/DL (ref 0.1–1)
BILIRUB UR QL STRIP: ABNORMAL
BUN BLD-MCNC: 13 MG/DL (ref 5–21)
BUN/CREAT SERPL: 16.9 (ref 7–25)
CALCIUM SPEC-SCNC: 10.2 MG/DL (ref 8.4–10.4)
CHLORIDE SERPL-SCNC: 105 MMOL/L (ref 98–110)
CLARITY UR: ABNORMAL
CO2 SERPL-SCNC: 29 MMOL/L (ref 24–31)
COD CRY URNS QL: ABNORMAL /HPF
COLOR UR: ABNORMAL
CREAT BLD-MCNC: 0.77 MG/DL (ref 0.5–1.4)
D-LACTATE SERPL-SCNC: 0.6 MMOL/L (ref 0.5–2)
DEPRECATED RDW RBC AUTO: 41.3 FL (ref 40–54)
EOSINOPHIL # BLD AUTO: 0.06 10*3/MM3 (ref 0–0.7)
EOSINOPHIL NFR BLD AUTO: 0.9 % (ref 0–4)
ERYTHROCYTE [DISTWIDTH] IN BLOOD BY AUTOMATED COUNT: 12.6 % (ref 12–15)
GFR SERPL CREATININE-BSD FRML MDRD: 81 ML/MIN/1.73
GLOBULIN UR ELPH-MCNC: 3.2 GM/DL
GLUCOSE BLD-MCNC: 109 MG/DL (ref 70–100)
GLUCOSE UR STRIP-MCNC: NEGATIVE MG/DL
HCT VFR BLD AUTO: 43.6 % (ref 37–47)
HGB BLD-MCNC: 14.8 G/DL (ref 12–16)
HGB UR QL STRIP.AUTO: NEGATIVE
HYALINE CASTS UR QL AUTO: ABNORMAL /LPF
IMM GRANULOCYTES # BLD: 0.02 10*3/MM3 (ref 0–0.03)
IMM GRANULOCYTES NFR BLD: 0.3 % (ref 0–5)
INR PPP: 0.86 (ref 0.91–1.09)
KETONES UR QL STRIP: ABNORMAL
LEUKOCYTE ESTERASE UR QL STRIP.AUTO: ABNORMAL
LIPASE SERPL-CCNC: 47 U/L (ref 23–203)
LYMPHOCYTES # BLD AUTO: 0.56 10*3/MM3 (ref 0.72–4.86)
LYMPHOCYTES NFR BLD AUTO: 8.1 % (ref 15–45)
MCH RBC QN AUTO: 30.5 PG (ref 28–32)
MCHC RBC AUTO-ENTMCNC: 33.9 G/DL (ref 33–36)
MCV RBC AUTO: 89.9 FL (ref 82–98)
MONOCYTES # BLD AUTO: 0.57 10*3/MM3 (ref 0.19–1.3)
MONOCYTES NFR BLD AUTO: 8.2 % (ref 4–12)
NEUTROPHILS # BLD AUTO: 5.69 10*3/MM3 (ref 1.87–8.4)
NEUTROPHILS NFR BLD AUTO: 82.2 % (ref 39–78)
NITRITE UR QL STRIP: POSITIVE
NRBC BLD MANUAL-RTO: 0 /100 WBC (ref 0–0)
PH UR STRIP.AUTO: <=5 [PH] (ref 5–8)
PLATELET # BLD AUTO: 194 10*3/MM3 (ref 130–400)
PMV BLD AUTO: 9.2 FL (ref 6–12)
POTASSIUM BLD-SCNC: 4.5 MMOL/L (ref 3.5–5.3)
PROT SERPL-MCNC: 7.7 G/DL (ref 6.3–8.7)
PROT UR QL STRIP: ABNORMAL
PROTHROMBIN TIME: 12 SECONDS (ref 11.9–14.6)
RBC # BLD AUTO: 4.85 10*6/MM3 (ref 4.2–5.4)
RBC # UR: ABNORMAL /HPF
REF LAB TEST METHOD: ABNORMAL
SODIUM BLD-SCNC: 146 MMOL/L (ref 135–145)
SP GR UR STRIP: 1.03 (ref 1–1.03)
SQUAMOUS #/AREA URNS HPF: ABNORMAL /HPF
TROPONIN I SERPL-MCNC: <0.012 NG/ML (ref 0–0.03)
UROBILINOGEN UR QL STRIP: ABNORMAL
WBC NRBC COR # BLD: 6.92 10*3/MM3 (ref 4.8–10.8)
WBC UR QL AUTO: ABNORMAL /HPF

## 2018-02-28 PROCEDURE — 84484 ASSAY OF TROPONIN QUANT: CPT | Performed by: NURSE PRACTITIONER

## 2018-02-28 PROCEDURE — 85730 THROMBOPLASTIN TIME PARTIAL: CPT | Performed by: NURSE PRACTITIONER

## 2018-02-28 PROCEDURE — 99214 OFFICE O/P EST MOD 30 MIN: CPT | Performed by: CLINICAL NURSE SPECIALIST

## 2018-02-28 PROCEDURE — 85610 PROTHROMBIN TIME: CPT | Performed by: NURSE PRACTITIONER

## 2018-02-28 PROCEDURE — 83690 ASSAY OF LIPASE: CPT | Performed by: NURSE PRACTITIONER

## 2018-02-28 PROCEDURE — 85025 COMPLETE CBC W/AUTO DIFF WBC: CPT | Performed by: NURSE PRACTITIONER

## 2018-02-28 PROCEDURE — 96374 THER/PROPH/DIAG INJ IV PUSH: CPT

## 2018-02-28 PROCEDURE — 93005 ELECTROCARDIOGRAM TRACING: CPT | Performed by: NURSE PRACTITIONER

## 2018-02-28 PROCEDURE — 83605 ASSAY OF LACTIC ACID: CPT | Performed by: NURSE PRACTITIONER

## 2018-02-28 PROCEDURE — 25010000002 ONDANSETRON PER 1 MG: Performed by: NURSE PRACTITIONER

## 2018-02-28 PROCEDURE — 87086 URINE CULTURE/COLONY COUNT: CPT | Performed by: NURSE PRACTITIONER

## 2018-02-28 PROCEDURE — 0 IOPAMIDOL 61 % SOLUTION: Performed by: NURSE PRACTITIONER

## 2018-02-28 PROCEDURE — 96375 TX/PRO/DX INJ NEW DRUG ADDON: CPT

## 2018-02-28 PROCEDURE — 96361 HYDRATE IV INFUSION ADD-ON: CPT

## 2018-02-28 PROCEDURE — 80053 COMPREHEN METABOLIC PANEL: CPT | Performed by: NURSE PRACTITIONER

## 2018-02-28 PROCEDURE — 99284 EMERGENCY DEPT VISIT MOD MDM: CPT

## 2018-02-28 PROCEDURE — 81001 URINALYSIS AUTO W/SCOPE: CPT | Performed by: NURSE PRACTITIONER

## 2018-02-28 PROCEDURE — 74177 CT ABD & PELVIS W/CONTRAST: CPT

## 2018-02-28 PROCEDURE — 25010000002 HYDROMORPHONE PER 4 MG: Performed by: NURSE PRACTITIONER

## 2018-02-28 PROCEDURE — 93010 ELECTROCARDIOGRAM REPORT: CPT | Performed by: INTERNAL MEDICINE

## 2018-02-28 RX ORDER — SODIUM CHLORIDE 0.9 % (FLUSH) 0.9 %
10 SYRINGE (ML) INJECTION AS NEEDED
Status: DISCONTINUED | OUTPATIENT
Start: 2018-02-28 | End: 2018-02-28 | Stop reason: HOSPADM

## 2018-02-28 RX ORDER — ONDANSETRON 2 MG/ML
4 INJECTION INTRAMUSCULAR; INTRAVENOUS ONCE
Status: COMPLETED | OUTPATIENT
Start: 2018-02-28 | End: 2018-02-28

## 2018-02-28 RX ORDER — NITROFURANTOIN 25; 75 MG/1; MG/1
100 CAPSULE ORAL 2 TIMES DAILY
Qty: 14 CAPSULE | Refills: 0 | Status: SHIPPED | OUTPATIENT
Start: 2018-02-28 | End: 2018-03-07

## 2018-02-28 RX ORDER — HYDROMORPHONE HCL 110MG/55ML
0.5 PATIENT CONTROLLED ANALGESIA SYRINGE INTRAVENOUS ONCE
Status: COMPLETED | OUTPATIENT
Start: 2018-02-28 | End: 2018-02-28

## 2018-02-28 RX ORDER — ONDANSETRON 4 MG/1
4 TABLET, ORALLY DISINTEGRATING ORAL EVERY 6 HOURS PRN
Qty: 12 TABLET | Refills: 0 | Status: SHIPPED | OUTPATIENT
Start: 2018-02-28 | End: 2018-03-03

## 2018-02-28 RX ORDER — OMEPRAZOLE 20 MG/1
20 CAPSULE, DELAYED RELEASE ORAL DAILY
Qty: 30 CAPSULE | Refills: 11 | Status: SHIPPED | OUTPATIENT
Start: 2018-02-28 | End: 2018-03-08

## 2018-02-28 RX ADMIN — ONDANSETRON 4 MG: 2 INJECTION INTRAMUSCULAR; INTRAVENOUS at 08:33

## 2018-02-28 RX ADMIN — HYDROMORPHONE HYDROCHLORIDE 0.5 MG: 2 INJECTION, SOLUTION INTRAMUSCULAR; INTRAVENOUS; SUBCUTANEOUS at 08:36

## 2018-02-28 RX ADMIN — SODIUM CHLORIDE 1000 ML: 9 INJECTION, SOLUTION INTRAVENOUS at 08:34

## 2018-02-28 RX ADMIN — IOPAMIDOL 100 ML: 612 INJECTION, SOLUTION INTRAVENOUS at 10:47

## 2018-02-28 NOTE — PROGRESS NOTES
Yesi Bowman  1970    2018  Chief Complaint   Patient presents with   • GI Problem     Subjective   HPI  Yesi Bowman is a 47 y.o. female who presents with a complaint of nausea, vomiting, diarrhea and abdominal pain. Pt says that this began approx 1 month ago with persistent ongoing nausea vomiting and diarrhea. She says food triggers it. No alleviating factors. she has associated pain as well located to the right side radiating to her back. Rated 3 out of 10 and dull ache. She says that she has bad days and good days no particular triggers. Today she went approx 10 times before going to the ER. She says she has had fever but afebrile at this time. She denies any BRBPR. No melena. She says that she has lost weight and I show 5 pounds over the past 2 weeks. She contributes this to a loss in appetite. No new medications. NO antibiotics. She went the ER this am bc her symptoms were so severe. They did labs and I have reviewed these today her WBC was normal. She does have a UTI. They gave her an antibiotic today for this.  CT abdomen today while in ER showing fluid throughout the colon and rectum suggesting a diarrheal illness.  Amylase and lipase normal. Liver functions normal  Past Medical History:   Diagnosis Date   • Alcoholism    • Cervical cancer    • Drug therapy    • History of radiation therapy     5040 cGy to cervix + 3000 cGy (HDRs x 5)   • Hx of colonic polyps    • Hx of radiation therapy    • Substance abuse      Past Surgical History:   Procedure Laterality Date   • APPENDECTOMY     • CERVICAL BIOPSY     •  SECTION     • COLONOSCOPY  2016    Hyperplastic polyp at 20 cm repeat exam in 5 years   • CYSTOSCOPY      Diagnostic   • DILATATION AND CURETTAGE     • HYSTERECTOMY     • KNEE SURGERY     • OOPHORECTOMY     • PELVIC EXAMINATION UNDER ANESTHESIA     • TONSILLECTOMY     • TOTAL LAPAROSCOPIC HYSTERECTOMY WITH DAVINCI ROBOT       Outpatient Prescriptions Marked as Taking for the  2/28/18 encounter (Office Visit) with MICHELE Enamorado   Medication Sig Dispense Refill   • atomoxetine (STRATTERA) 40 MG capsule Take 40 mg by mouth Daily.     • nitrofurantoin, macrocrystal-monohydrate, (MACROBID) 100 MG capsule Take 1 capsule by mouth 2 (Two) Times a Day for 7 days. 14 capsule 0   • ondansetron ODT (ZOFRAN-ODT) 4 MG disintegrating tablet Take 1 tablet by mouth Every 6 (Six) Hours As Needed for Nausea or Vomiting for up to 3 days. 12 tablet 0   • sertraline (ZOLOFT) 100 MG tablet Take 100 mg by mouth Daily.     • traZODone (DESYREL) 150 MG tablet Take 150 mg by mouth Every Night.       No Known Allergies  Social History     Social History   • Marital status:      Spouse name: N/A   • Number of children: N/A   • Years of education: N/A     Occupational History   • Not on file.     Social History Main Topics   • Smoking status: Former Smoker     Packs/day: 0.50     Types: Cigarettes     Quit date: 12/26/2016   • Smokeless tobacco: Never Used   • Alcohol use No   • Drug use: No   • Sexual activity: No     Other Topics Concern   • Not on file     Social History Narrative     Family History   Problem Relation Age of Onset   • Skin cancer Mother    • Cancer Maternal Grandmother      Malignancy   • Cancer Paternal Grandmother      Malignancy   • Colon cancer Neg Hx    • Colon polyps Neg Hx      Health Maintenance   Topic Date Due   • TDAP/TD VACCINES (1 - Tdap) 10/30/1991   • INFLUENZA VACCINE  08/01/2017     Review of Systems   Constitutional: Negative for activity change, appetite change, chills, diaphoresis, fatigue, fever and unexpected weight change.   HENT: Negative for ear pain, hearing loss, mouth sores, sore throat, trouble swallowing and voice change.    Eyes: Negative.    Respiratory: Negative for cough, choking, shortness of breath and wheezing.    Cardiovascular: Negative for chest pain and palpitations.   Gastrointestinal: Positive for abdominal pain, diarrhea, nausea and  "vomiting. Negative for blood in stool and constipation.   Endocrine: Negative for cold intolerance and heat intolerance.   Genitourinary: Negative for decreased urine volume, dysuria, frequency, hematuria and urgency.   Musculoskeletal: Negative for back pain, gait problem and myalgias.   Skin: Negative for color change, pallor and rash.   Allergic/Immunologic: Negative for food allergies and immunocompromised state.   Neurological: Negative for dizziness, tremors, seizures, syncope, weakness, light-headedness, numbness and headaches.   Hematological: Negative for adenopathy. Does not bruise/bleed easily.   Psychiatric/Behavioral: Negative for agitation and confusion. The patient is not nervous/anxious.    All other systems reviewed and are negative.    Objective   Vitals:    02/28/18 1353   BP: 118/60   Pulse: 80   Temp: 97.8 °F (36.6 °C)   SpO2: 98%   Weight: 82.1 kg (181 lb)   Height: 172.7 cm (68\")     Body mass index is 27.52 kg/(m^2).  Physical Exam   Constitutional: She is oriented to person, place, and time. She appears well-developed and well-nourished.   HENT:   Head: Normocephalic and atraumatic.   Eyes: Pupils are equal, round, and reactive to light.   Neck: Normal range of motion. Neck supple. No tracheal deviation present.   Cardiovascular: Normal rate, regular rhythm and normal heart sounds.  Exam reveals no gallop and no friction rub.    No murmur heard.  Pulmonary/Chest: Effort normal and breath sounds normal. No respiratory distress. She has no wheezes. She has no rales. She exhibits no tenderness.   Abdominal: Soft. Bowel sounds are normal. She exhibits no distension. There is no hepatosplenomegaly. There is no tenderness. There is no rigidity, no rebound and no guarding.   Musculoskeletal: Normal range of motion. She exhibits no edema, tenderness or deformity.   Neurological: She is alert and oriented to person, place, and time. She has normal reflexes.   Skin: Skin is warm and dry. No rash " noted. No pallor.   Psychiatric: She has a normal mood and affect. Her behavior is normal. Judgment and thought content normal.     Assessment/Plan   Yesi was seen today for gi problem.    Diagnoses and all orders for this visit:    Diarrhea, unspecified type  -     Fecal Leukocytes - Stool, Per Rectum    Nausea and vomiting, intractability of vomiting not specified, unspecified vomiting type  -     US Gallbladder  -     NM HIDA Scan With Pharmacological Intervention  -     omeprazole (priLOSEC) 20 MG capsule; Take 1 capsule by mouth Daily.    Obesity, unspecified obesity severity, unspecified obesity type    Nonsmoker        EMR Dragon/transcription disclaimer: Much of this encounter note is electronic transcription/translation of spoken language to printed text. The electronic translation of spoken language may be erroneous, or at times, nonsensical words or phrases may be inadvertently transcribed. Although I have reviewed the note for such errors, some may still exist.  Body mass index is 27.52 kg/(m^2).  Return if symptoms worsen or fail to improve.    Patient's BMI is above normal parameters. Follow-up plan includes:  nutrition counseling.      All risks, benefits, alternatives, and indications of colonoscopy and/or Endoscopy procedure have been discussed with the patient. Risks to include perforation of the colon requiring possible surgery or colostomy, risk of bleeding from biopsies or removal of colon tissue, possibility of missing a colon polyp or cancer, or adverse drug reaction.  Benefits to include the diagnosis and management of disease of the colon and rectum. Alternatives to include barium enema, radiographic evaluation, lab testing or no intervention. Pt verbalizes understanding and agrees.     Hiral Schneider, APRN  2/28/2018  2:28 PM      Obesity, Adult  Obesity is the condition of having too much total body fat. Being overweight or obese means that your weight is greater than what is  considered healthy for your body size. Obesity is determined by a measurement called BMI. BMI is an estimate of body fat and is calculated from height and weight. For adults, a BMI of 30 or higher is considered obese.  Obesity can eventually lead to other health concerns and major illnesses, including:  · Stroke.  · Coronary artery disease (CAD).  · Type 2 diabetes.  · Some types of cancer, including cancers of the colon, breast, uterus, and gallbladder.  · Osteoarthritis.  · High blood pressure (hypertension).  · High cholesterol.  · Sleep apnea.  · Gallbladder stones.  · Infertility problems.  What are the causes?  The main cause of obesity is taking in (consuming) more calories than your body uses for energy. Other factors that contribute to this condition may include:  · Being born with genes that make you more likely to become obese.  · Having a medical condition that causes obesity. These conditions include:  ¨ Hypothyroidism.  ¨ Polycystic ovarian syndrome (PCOS).  ¨ Binge-eating disorder.  ¨ Cushing syndrome.  · Taking certain medicines, such as steroids, antidepressants, and seizure medicines.  · Not being physically active (sedentary lifestyle).  · Living where there are limited places to exercise safely or buy healthy foods.  · Not getting enough sleep.  What increases the risk?  The following factors may increase your risk of this condition:  · Having a family history of obesity.  · Being a woman of -American descent.  · Being a man of  descent.  What are the signs or symptoms?  Having excessive body fat is the main symptom of this condition.  How is this diagnosed?  This condition may be diagnosed based on:  · Your symptoms.  · Your medical history.  · A physical exam. Your health care provider may measure:  ¨ Your BMI. If you are an adult with a BMI between 25 and less than 30, you are considered overweight. If you are an adult with a BMI of 30 or higher, you are considered obese.  ¨ The  distances around your hips and your waist (circumferences). These may be compared to each other to help diagnose your condition.  ¨ Your skinfold thickness. Your health care provider may gently pinch a fold of your skin and measure it.  How is this treated?  Treatment for this condition often includes changing your lifestyle. Treatment may include some or all of the following:  · Dietary changes. Work with your health care provider and a dietitian to set a weight-loss goal that is healthy and reasonable for you. Dietary changes may include eating:  ¨ Smaller portions. A portion size is the amount of a particular food that is healthy for you to eat at one time. This varies from person to person.  ¨ Low-calorie or low-fat options.  ¨ More whole grains, fruits, and vegetables.  · Regular physical activity. This may include aerobic activity (cardio) and strength training.  · Medicine to help you lose weight. Your health care provider may prescribe medicine if you are unable to lose 1 pound a week after 6 weeks of eating more healthily and doing more physical activity.  · Surgery. Surgical options may include gastric banding and gastric bypass. Surgery may be done if:  ¨ Other treatments have not helped to improve your condition.  ¨ You have a BMI of 40 or higher.  ¨ You have life-threatening health problems related to obesity.  Follow these instructions at home:     Eating and drinking     · Follow recommendations from your health care provider about what you eat and drink. Your health care provider may advise you to:  ¨ Limit fast foods, sweets, and processed snack foods.  ¨ Choose low-fat options, such as low-fat milk instead of whole milk.  ¨ Eat 5 or more servings of fruits or vegetables every day.  ¨ Eat at home more often. This gives you more control over what you eat.  ¨ Choose healthy foods when you eat out.  ¨ Learn what a healthy portion size is.  ¨ Keep low-fat snacks on hand.  ¨ Avoid sugary drinks, such as  soda, fruit juice, iced tea sweetened with sugar, and flavored milk.  ¨ Eat a healthy breakfast.  · Drink enough water to keep your urine clear or pale yellow.  · Do not go without eating for long periods of time (do not fast) or follow a fad diet. Fasting and fad diets can be unhealthy and even dangerous.  Physical Activity   · Exercise regularly, as told by your health care provider. Ask your health care provider what types of exercise are safe for you and how often you should exercise.  · Warm up and stretch before being active.  · Cool down and stretch after being active.  · Rest between periods of activity.  Lifestyle   · Limit the time that you spend in front of your TV, computer, or video game system.  · Find ways to reward yourself that do not involve food.  · Limit alcohol intake to no more than 1 drink a day for nonpregnant women and 2 drinks a day for men. One drink equals 12 oz of beer, 5 oz of wine, or 1½ oz of hard liquor.  General instructions   · Keep a weight loss journal to keep track of the food you eat and how much you exercise you get.  · Take over-the-counter and prescription medicines only as told by your health care provider.  · Take vitamins and supplements only as told by your health care provider.  · Consider joining a support group. Your health care provider may be able to recommend a support group.  · Keep all follow-up visits as told by your health care provider. This is important.  Contact a health care provider if:  · You are unable to meet your weight loss goal after 6 weeks of dietary and lifestyle changes.  This information is not intended to replace advice given to you by your health care provider. Make sure you discuss any questions you have with your health care provider.  Document Released: 01/25/2006 Document Revised: 05/22/2017 Document Reviewed: 10/05/2016  [employer] Interactive Patient Education © 2017 [employer]      If you smoke or use tobacco, 4 minutes reading  provided  Steps to Quit Smoking  Smoking tobacco can be harmful to your health and can affect almost every organ in your body. Smoking puts you, and those around you, at risk for developing many serious chronic diseases. Quitting smoking is difficult, but it is one of the best things that you can do for your health. It is never too late to quit.  What are the benefits of quitting smoking?  When you quit smoking, you lower your risk of developing serious diseases and conditions, such as:  · Lung cancer or lung disease, such as COPD.  · Heart disease.  · Stroke.  · Heart attack.  · Infertility.  · Osteoporosis and bone fractures.  Additionally, symptoms such as coughing, wheezing, and shortness of breath may get better when you quit. You may also find that you get sick less often because your body is stronger at fighting off colds and infections. If you are pregnant, quitting smoking can help to reduce your chances of having a baby of low birth weight.  How do I get ready to quit?  When you decide to quit smoking, create a plan to make sure that you are successful. Before you quit:  · Pick a date to quit. Set a date within the next two weeks to give you time to prepare.  · Write down the reasons why you are quitting. Keep this list in places where you will see it often, such as on your bathroom mirror or in your car or wallet.  · Identify the people, places, things, and activities that make you want to smoke (triggers) and avoid them. Make sure to take these actions:  ¨ Throw away all cigarettes at home, at work, and in your car.  ¨ Throw away smoking accessories, such as ashtrays and lighters.  ¨ Clean your car and make sure to empty the ashtray.  ¨ Clean your home, including curtains and carpets.  · Tell your family, friends, and coworkers that you are quitting. Support from your loved ones can make quitting easier.  · Talk with your health care provider about your options for quitting smoking.  · Find out what  treatment options are covered by your health insurance.  What strategies can I use to quit smoking?  Talk with your healthcare provider about different strategies to quit smoking. Some strategies include:  · Quitting smoking altogether instead of gradually lessening how much you smoke over a period of time. Research shows that quitting “cold turkey” is more successful than gradually quitting.  · Attending in-person counseling to help you build problem-solving skills. You are more likely to have success in quitting if you attend several counseling sessions. Even short sessions of 10 minutes can be effective.  · Finding resources and support systems that can help you to quit smoking and remain smoke-free after you quit. These resources are most helpful when you use them often. They can include:  ¨ Online chats with a counselor.  ¨ Telephone quitlines.  ¨ Printed self-help materials.  ¨ Support groups or group counseling.  ¨ Text messaging programs.  ¨ Mobile phone applications.  · Taking medicines to help you quit smoking. (If you are pregnant or breastfeeding, talk with your health care provider first.) Some medicines contain nicotine and some do not. Both types of medicines help with cravings, but the medicines that include nicotine help to relieve withdrawal symptoms. Your health care provider may recommend:  ¨ Nicotine patches, gum, or lozenges.  ¨ Nicotine inhalers or sprays.  ¨ Non-nicotine medicine that is taken by mouth.  Talk with your health care provider about combining strategies, such as taking medicines while you are also receiving in-person counseling. Using these two strategies together makes you more likely to succeed in quitting than if you used either strategy on its own.  If you are pregnant or breastfeeding, talk with your health care provider about finding counseling or other support strategies to quit smoking. Do not take medicine to help you quit smoking unless told to do so by your health care  provider.  What things can I do to make it easier to quit?  Quitting smoking might feel overwhelming at first, but there is a lot that you can do to make it easier. Take these important actions:  · Reach out to your family and friends and ask that they support and encourage you during this time. Call telephone quitlines, reach out to support groups, or work with a counselor for support.  · Ask people who smoke to avoid smoking around you.  · Avoid places that trigger you to smoke, such as bars, parties, or smoke-break areas at work.  · Spend time around people who do not smoke.  · Lessen stress in your life, because stress can be a smoking trigger for some people. To lessen stress, try:  ¨ Exercising regularly.  ¨ Deep-breathing exercises.  ¨ Yoga.  ¨ Meditating.  ¨ Performing a body scan. This involves closing your eyes, scanning your body from head to toe, and noticing which parts of your body are particularly tense. Purposefully relax the muscles in those areas.  · Download or purchase mobile phone or tablet apps (applications) that can help you stick to your quit plan by providing reminders, tips, and encouragement. There are many free apps, such as QuitGuide from the CDC (Centers for Disease Control and Prevention). You can find other support for quitting smoking (smoking cessation) through smokefree.gov and other websites.  How will I feel when I quit smoking?  Within the first 24 hours of quitting smoking, you may start to feel some withdrawal symptoms. These symptoms are usually most noticeable 2-3 days after quitting, but they usually do not last beyond 2-3 weeks. Changes or symptoms that you might experience include:  · Mood swings.  · Restlessness, anxiety, or irritation.  · Difficulty concentrating.  · Dizziness.  · Strong cravings for sugary foods in addition to nicotine.  · Mild weight gain.  · Constipation.  · Nausea.  · Coughing or a sore throat.  · Changes in how your medicines work in your  body.  · A depressed mood.  · Difficulty sleeping (insomnia).  After the first 2-3 weeks of quitting, you may start to notice more positive results, such as:  · Improved sense of smell and taste.  · Decreased coughing and sore throat.  · Slower heart rate.  · Lower blood pressure.  · Clearer skin.  · The ability to breathe more easily.  · Fewer sick days.  Quitting smoking is very challenging for most people. Do not get discouraged if you are not successful the first time. Some people need to make many attempts to quit before they achieve long-term success. Do your best to stick to your quit plan, and talk with your health care provider if you have any questions or concerns.  This information is not intended to replace advice given to you by your health care provider. Make sure you discuss any questions you have with your health care provider.  Document Released: 12/12/2002 Document Revised: 08/15/2017 Document Reviewed: 05/03/2016  [employer] Interactive Patient Education © 2017 [employer]

## 2018-03-02 ENCOUNTER — APPOINTMENT (OUTPATIENT)
Dept: LAB | Facility: HOSPITAL | Age: 48
End: 2018-03-02

## 2018-03-02 ENCOUNTER — HOSPITAL ENCOUNTER (OUTPATIENT)
Dept: ULTRASOUND IMAGING | Facility: HOSPITAL | Age: 48
Discharge: HOME | End: 2018-03-02
Admitting: CLINICAL NURSE SPECIALIST

## 2018-03-02 ENCOUNTER — HOSPITAL ENCOUNTER (OUTPATIENT)
Dept: NUCLEAR MEDICINE | Facility: HOSPITAL | Age: 48
Discharge: HOME | End: 2018-03-02

## 2018-03-02 LAB
ADV 40+41 DNA STL QL NAA+NON-PROBE: NOT DETECTED
ASTRO TYP 1-8 RNA STL QL NAA+NON-PROBE: NOT DETECTED
BACTERIA SPEC AEROBE CULT: ABNORMAL
BACTERIA SPEC AEROBE CULT: ABNORMAL
C CAYETANENSIS DNA STL QL NAA+NON-PROBE: NOT DETECTED
C DIFF TOX GENS STL QL NAA+PROBE: NOT DETECTED
CAMPY SP DNA.DIARRHEA STL QL NAA+PROBE: NOT DETECTED
CRYPTOSP STL CULT: NOT DETECTED
E COLI DNA SPEC QL NAA+PROBE: NOT DETECTED
E HISTOLYT AG STL-ACNC: NOT DETECTED
EAEC PAA PLAS AGGR+AATA ST NAA+NON-PRB: NOT DETECTED
EC STX1+STX2 GENES STL QL NAA+NON-PROBE: NOT DETECTED
EPEC EAE GENE STL QL NAA+NON-PROBE: NOT DETECTED
ETEC LTA+ST1A+ST1B TOX ST NAA+NON-PROBE: NOT DETECTED
G LAMBLIA DNA SPEC QL NAA+PROBE: NOT DETECTED
NOROVIRUS GI+II RNA STL QL NAA+NON-PROBE: NOT DETECTED
P SHIGELLOIDES DNA STL QL NAA+NON-PROBE: NOT DETECTED
RV RNA STL NAA+PROBE: NOT DETECTED
SALMONELLA DNA SPEC QL NAA+PROBE: NOT DETECTED
SAPO I+II+IV+V RNA STL QL NAA+NON-PROBE: NOT DETECTED
SHIGELLA SP+EIEC IPAH ST NAA+NON-PROBE: NOT DETECTED
V CHOLERAE DNA SPEC QL NAA+PROBE: NOT DETECTED
VIBRIO DNA SPEC NAA+PROBE: NOT DETECTED
WBC STL QL MICRO: NORMAL
YERSINIA STL CULT: NOT DETECTED

## 2018-03-02 PROCEDURE — 87205 SMEAR GRAM STAIN: CPT | Performed by: CLINICAL NURSE SPECIALIST

## 2018-03-02 PROCEDURE — 0 TECHNETIUM TC 99M MEBROFENIN KIT: Performed by: CLINICAL NURSE SPECIALIST

## 2018-03-02 PROCEDURE — A9537 TC99M MEBROFENIN: HCPCS | Performed by: CLINICAL NURSE SPECIALIST

## 2018-03-02 PROCEDURE — 87507 IADNA-DNA/RNA PROBE TQ 12-25: CPT | Performed by: NURSE PRACTITIONER

## 2018-03-02 PROCEDURE — 78227 HEPATOBIL SYST IMAGE W/DRUG: CPT

## 2018-03-02 PROCEDURE — 76705 ECHO EXAM OF ABDOMEN: CPT

## 2018-03-02 RX ORDER — KIT FOR THE PREPARATION OF TECHNETIUM TC 99M MEBROFENIN 45 MG/10ML
1 INJECTION, POWDER, LYOPHILIZED, FOR SOLUTION INTRAVENOUS
Status: COMPLETED | OUTPATIENT
Start: 2018-03-02 | End: 2018-03-02

## 2018-03-02 RX ADMIN — MEBROFENIN 1 DOSE: 45 INJECTION, POWDER, LYOPHILIZED, FOR SOLUTION INTRAVENOUS at 07:32

## 2018-03-08 ENCOUNTER — APPOINTMENT (OUTPATIENT)
Dept: PREADMISSION TESTING | Facility: HOSPITAL | Age: 48
End: 2018-03-08

## 2018-03-08 VITALS
OXYGEN SATURATION: 99 % | RESPIRATION RATE: 16 BRPM | HEIGHT: 68 IN | BODY MASS INDEX: 27.36 KG/M2 | HEART RATE: 88 BPM | WEIGHT: 180.56 LBS | DIASTOLIC BLOOD PRESSURE: 58 MMHG | SYSTOLIC BLOOD PRESSURE: 98 MMHG

## 2018-03-08 LAB
ALBUMIN SERPL-MCNC: 4.4 G/DL (ref 3.5–5)
ALBUMIN/GLOB SERPL: 1.4 G/DL (ref 1.1–2.5)
ALP SERPL-CCNC: 49 U/L (ref 24–120)
ALT SERPL W P-5'-P-CCNC: 25 U/L (ref 0–54)
ANION GAP SERPL CALCULATED.3IONS-SCNC: 12 MMOL/L (ref 4–13)
AST SERPL-CCNC: 16 U/L (ref 7–45)
BASOPHILS # BLD AUTO: 0.02 10*3/MM3 (ref 0–0.2)
BASOPHILS NFR BLD AUTO: 0.4 % (ref 0–2)
BILIRUB SERPL-MCNC: 0.6 MG/DL (ref 0.1–1)
BUN BLD-MCNC: 13 MG/DL (ref 5–21)
BUN/CREAT SERPL: 18.1 (ref 7–25)
CALCIUM SPEC-SCNC: 10.1 MG/DL (ref 8.4–10.4)
CHLORIDE SERPL-SCNC: 102 MMOL/L (ref 98–110)
CO2 SERPL-SCNC: 27 MMOL/L (ref 24–31)
CREAT BLD-MCNC: 0.72 MG/DL (ref 0.5–1.4)
DEPRECATED RDW RBC AUTO: 42.1 FL (ref 40–54)
EOSINOPHIL # BLD AUTO: 0.11 10*3/MM3 (ref 0–0.7)
EOSINOPHIL NFR BLD AUTO: 2.2 % (ref 0–4)
ERYTHROCYTE [DISTWIDTH] IN BLOOD BY AUTOMATED COUNT: 12.9 % (ref 12–15)
GFR SERPL CREATININE-BSD FRML MDRD: 88 ML/MIN/1.73
GLOBULIN UR ELPH-MCNC: 3.1 GM/DL
GLUCOSE BLD-MCNC: 87 MG/DL (ref 70–100)
HCT VFR BLD AUTO: 37.9 % (ref 37–47)
HGB BLD-MCNC: 12.9 G/DL (ref 12–16)
IMM GRANULOCYTES # BLD: 0.01 10*3/MM3 (ref 0–0.03)
IMM GRANULOCYTES NFR BLD: 0.2 % (ref 0–5)
LYMPHOCYTES # BLD AUTO: 1.35 10*3/MM3 (ref 0.72–4.86)
LYMPHOCYTES NFR BLD AUTO: 27.5 % (ref 15–45)
MCH RBC QN AUTO: 30.6 PG (ref 28–32)
MCHC RBC AUTO-ENTMCNC: 34 G/DL (ref 33–36)
MCV RBC AUTO: 90 FL (ref 82–98)
MONOCYTES # BLD AUTO: 0.41 10*3/MM3 (ref 0.19–1.3)
MONOCYTES NFR BLD AUTO: 8.4 % (ref 4–12)
NEUTROPHILS # BLD AUTO: 3.01 10*3/MM3 (ref 1.87–8.4)
NEUTROPHILS NFR BLD AUTO: 61.3 % (ref 39–78)
NRBC BLD MANUAL-RTO: 0 /100 WBC (ref 0–0)
PLATELET # BLD AUTO: 191 10*3/MM3 (ref 130–400)
PMV BLD AUTO: 9.4 FL (ref 6–12)
POTASSIUM BLD-SCNC: 4.3 MMOL/L (ref 3.5–5.3)
PROT SERPL-MCNC: 7.5 G/DL (ref 6.3–8.7)
RBC # BLD AUTO: 4.21 10*6/MM3 (ref 4.2–5.4)
SODIUM BLD-SCNC: 141 MMOL/L (ref 135–145)
WBC NRBC COR # BLD: 4.91 10*3/MM3 (ref 4.8–10.8)

## 2018-03-08 PROCEDURE — 80053 COMPREHEN METABOLIC PANEL: CPT | Performed by: SPECIALIST

## 2018-03-08 PROCEDURE — 85025 COMPLETE CBC W/AUTO DIFF WBC: CPT | Performed by: SPECIALIST

## 2018-03-08 PROCEDURE — 36415 COLL VENOUS BLD VENIPUNCTURE: CPT

## 2018-03-08 RX ORDER — CHLORAL HYDRATE 500 MG
1000 CAPSULE ORAL
COMMUNITY
End: 2023-03-09

## 2018-03-08 RX ORDER — MELATONIN
1000 DAILY
COMMUNITY

## 2018-03-08 NOTE — DISCHARGE INSTRUCTIONS
DAY OF SURGERY INSTRUCTIONS        YOUR SURGEON: April Jesus     PROCEDURE: Laparoscopic Cholecytectomy    DATE OF SURGERY: March 9, 2018     ARRIVAL TIME: AS DIRECTED BY OFFICE    DAY OF SURGERY TAKE ONLY THESE MEDICATIONS: None         BEFORE YOU COME TO THE HOSPITAL  (Pre-op instructions)  • Do not eat, drink, smoke or chew gum after midnight the night before surgery.  This also includes no mints.  • Morning of surgery take only the medicines you have been instructed with a sip of water unless otherwise instructed  by your physician.  • Do not shave, wear makeup or dark nail polish.  • Remove all jewelry including rings.  • Leave anything you consider valuable at home.  • Leave your suitcase in the car until after your surgery.  • Bring the following with you if applicable:  o Picture ID and insurance, Medicare or Medicaid cards  o Co-pay/deductible required by insurance (cash, check, credit card)  o Copy of advance directive, living will or power-of- documents if not brought to PAT  o CPAP or BIPAP mask and tubing  o Relaxation aids (MP3 player, book, magazine)  • On the day of surgery check in at registration located at the main entrance of the hospital.       Outpatient Surgery Guidelines, Adult  Outpatient procedures are those for which the person having the procedure is allowed to go home the same day as the procedure. Various procedures are done on an outpatient basis. You should follow some general guidelines if you will be having an outpatient procedure.  LET YOUR HEALTH CARE PROVIDER KNOW ABOUT:  · Any allergies you have.  · All medicines you are taking, including vitamins, herbs, eye drops, creams, and over-the-counter medicines.  · Previous problems you or members of your family have had with the use of anesthetics.  · Any blood disorders you have.  · Previous surgeries you have had.  · Medical conditions you have.  RISKS AND COMPLICATIONS  Your health care provider will discuss possible  risks and complications with you before surgery. Common risks and complications include:    · Problems due to the use of anesthetics.  · Blood loss and replacement (does not apply to minor surgical procedures).  · Temporary increase in pain due to surgery.  · Uncorrected pain or problems that the surgery was meant to correct.  · Infection.  · New damage.  BEFORE THE PROCEDURE  · Ask your health care provider about changing or stopping your regular medicines. You may need to stop taking certain medicines in the days or weeks before the procedure.  · Stop smoking at least 2 weeks before surgery. This lowers your risk for complications during and after surgery. Ask your health care provider for help with this if needed.  · Eat your usual meals and a light supper the day before surgery. Continue fluid intake. Do not drink alcohol.  · Do not eat or drink after midnight the night before your surgery.   · Arrange for someone to take you home and to stay with you for 24 hours after the procedure. Medicine given for your procedure may affect your ability to drive or to care for yourself.  · Call your health care provider's office if you develop an illness or problem that may prevent you from safely having your procedure.  AFTER THE PROCEDURE  After surgery, you will be taken to a recovery area, where your progress will be monitored. If there are no complications, you will be allowed to go home when you are awake, stable, and taking fluids well. You may have numbness around the surgical site. Healing will take some time. You will have tenderness at the surgical site and may have some swelling and bruising. You may also have some nausea.  HOME CARE INSTRUCTIONS  · Do not drive for 24 hours, or as directed by your health care provider. Do not drive while taking prescription pain medicines.  · Do not drink alcohol for 24 hours.  · Do not make important decisions or sign legal documents for 24 hours.  · You may resume a normal  diet and activities as directed.  · Do not lift anything heavier than 10 pounds (4.5 kg) or play contact sports until your health care provider says it is okay.  · Change your bandages (dressings) as directed.  · Only take over-the-counter or prescription medicines as directed by your health care provider.  · Follow up with your health care provider as directed.  SEEK MEDICAL CARE IF:  · You have increased bleeding (more than a small spot) from the surgical site.  · You have redness, swelling, or increasing pain in the wound.  · You see pus coming from the wound.  · You have a fever.  · You notice a bad smell coming from the wound or dressing.  · You feel lightheaded or faint.  · You develop a rash.  · You have trouble breathing.  · You develop allergies.  MAKE SURE YOU:  · Understand these instructions.  · Will watch your condition.  · Will get help right away if you are not doing well or get worse.     This information is not intended to replace advice given to you by your health care provider. Make sure you discuss any questions you have with your health care provider.     Document Released: 09/12/2002 Document Revised: 05/03/2016 Document Reviewed: 05/22/2014  [employer] Interactive Patient Education ©2016 [employer]       Fall Prevention in Hospitals, Adult  As a hospital patient, your condition and the treatments you receive can increase your risk for falls. Some additional risk factors for falls in a hospital include:  · Being in an unfamiliar environment.  · Being on bed rest.  · Your surgery.  · Taking certain medicines.  · Your tubing requirements, such as intravenous (IV) therapy or catheters.  It is important that you learn how to decrease fall risks while at the hospital. Below are important tips that can help prevent falls.  SAFETY TIPS FOR PREVENTING FALLS  Talk about your risk of falling.  · Ask your health care provider why you are at risk for falling. Is it your medicine, illness, tubing  placement, or something else?  · Make a plan with your health care provider to keep you safe from falls.  · Ask your health care provider or pharmacist about side effects of your medicines. Some medicines can make you dizzy or affect your coordination.  Ask for help.  · Ask for help before getting out of bed. You may need to press your call button.  · Ask for assistance in getting safely to the toilet.  · Ask for a walker or cane to be put at your bedside. Ask that most of the side rails on your bed be placed up before your health care provider leaves the room.  · Ask family or friends to sit with you.  · Ask for things that are out of your reach, such as your glasses, hearing aids, telephone, bedside table, or call button.  Follow these tips to avoid falling:  · Stay lying or seated, rather than standing, while waiting for help.  · Wear rubber-soled slippers or shoes whenever you walk in the hospital.  · Avoid quick, sudden movements.  ¨ Change positions slowly.  ¨ Sit on the side of your bed before standing.  ¨ Stand up slowly and wait before you start to walk.  · Let your health care provider know if there is a spill on the floor.  · Pay careful attention to the medical equipment, electrical cords, and tubes around you.  · When you need help, use your call button by your bed or in the bathroom. Wait for one of your health care providers to help you.  · If you feel dizzy or unsure of your footing, return to bed and wait for assistance.  · Avoid being distracted by the TV, telephone, or another person in your room.  · Do not lean or support yourself on rolling objects, such as IV poles or bedside tables.     This information is not intended to replace advice given to you by your health care provider. Make sure you discuss any questions you have with your health care provider.     Document Released: 12/15/2001 Document Revised: 01/08/2016 Document Reviewed: 08/25/2013  [employer] Interactive Patient Education ©2016  [employer]       Surgical Site Infections FAQs  What is a Surgical Site Infection (SSI)?  A surgical site infection is an infection that occurs after surgery in the part of the body where the surgery took place. Most patients who have surgery do not develop an infection. However, infections develop in about 1 to 3 out of every 100 patients who have surgery.  Some of the common symptoms of a surgical site infection are:  · Redness and pain around the area where you had surgery  · Drainage of cloudy fluid from your surgical wound  · Fever  Can SSIs be treated?  Yes. Most surgical site infections can be treated with antibiotics. The antibiotic given to you depends on the bacteria (germs) causing the infection. Sometimes patients with SSIs also need another surgery to treat the infection.  What are some of the things that hospitals are doing to prevent SSIs?  To prevent SSIs, doctors, nurses, and other healthcare providers:  · Clean their hands and arms up to their elbows with an antiseptic agent just before the surgery.  · Clean their hands with soap and water or an alcohol-based hand rub before and after caring for each patient.  · May remove some of your hair immediately before your surgery using electric clippers if the hair is in the same area where the procedure will occur. They should not shave you with a razor.  · Wear special hair covers, masks, gowns, and gloves during surgery to keep the surgery area clean.  · Give you antibiotics before your surgery starts. In most cases, you should get antibiotics within 60 minutes before the surgery starts and the antibiotics should be stopped within 24 hours after surgery.  · Clean the skin at the site of your surgery with a special soap that kills germs.  What can I do to help prevent SSIs?  Before your surgery:  · Tell your doctor about other medical problems you may have. Health problems such as allergies, diabetes, and obesity could affect your surgery and your  treatment.  · Quit smoking. Patients who smoke get more infections. Talk to your doctor about how you can quit before your surgery.  · Do not shave near where you will have surgery. Shaving with a razor can irritate your skin and make it easier to develop an infection.  At the time of your surgery:  · Speak up if someone tries to shave you with a razor before surgery. Ask why you need to be shaved and talk with your surgeon if you have any concerns.  · Ask if you will get antibiotics before surgery.  After your surgery:  · Make sure that your healthcare providers clean their hands before examining you, either with soap and water or an alcohol-based hand rub.  · If you do not see your providers clean their hands, please ask them to do so.  · Family and friends who visit you should not touch the surgical wound or dressings.  · Family and friends should clean their hands with soap and water or an alcohol-based hand rub before and after visiting you. If you do not see them clean their hands, ask them to clean their hands.  What do I need to do when I go home from the hospital?  · Before you go home, your doctor or nurse should explain everything you need to know about taking care of your wound. Make sure you understand how to care for your wound before you leave the hospital.  · Always clean your hands before and after caring for your wound.  · Before you go home, make sure you know who to contact if you have questions or problems after you get home.  · If you have any symptoms of an infection, such as redness and pain at the surgery site, drainage, or fever, call your doctor immediately.  If you have additional questions, please ask your doctor or nurse.  Developed and co-sponsored by The Society for Healthcare Epidemiology of [place] (SHEA); Infectious Diseases Society of [place] (IDSA); American Hospital Association; Association for Professionals in Infection Control and Epidemiology (APIC); Centers for Disease  Control and Prevention (CDC); and The Joint Commission.     This information is not intended to replace advice given to you by your health care provider. Make sure you discuss any questions you have with your health care provider.     Document Released: 12/23/2014 Document Revised: 01/08/2016 Document Reviewed: 03/02/2016  [employer] Interactive Patient Education ©2016 [employer].       [facility]  CHG 4% Patient Instruction Sheet    Preparing the Skin Before Surgery  Preparing or “prepping” skin before surgery can reduce the risk of infection at the surgical site. To make the process easier,[facility] has chosen 4% Chlorhexidine Gluconate (CHG) antiseptic solution.   The steps below outline the prepping process and should be carefully followed.                                                                                                                                                      Prep the skin at the following time(s):                                                      We recommend you shower the night before surgery, and again the morning of surgery with the 4% CHG antiseptic solution using half of the bottle and a cloth each time.  Dress in clean clothes/sleepwear after showering.  See instructions below for application.          Do not apply any lotions or moisturizers.       Do not shave the area to be prepped for at least 2 days prior to surgery.    Clipping the hair may be done immediately prior to your surgery at the hospital    if needed.    Directions:  Thoroughly rinse your body with water.  Apply 4% CHG to a cloth and wash skin gently, paying special attention to the operative site.  Rinse again thoroughly.  Once you have begun using this product do not apply anything else to your skin. If itching or redness persists, rinse affected areas and discontinue use.    When using this product:  • Keep out of eyes, ears, and mouth.  • If solution should contact these areas, rinse out promptly  and thoroughly with water.  • For external use only.  • Do not use in genital area, on your face or head.      PATIENT/FAMILY/RESPONSIBLE PARTY VERBALIZES UNDERSTANDING OF ABOVE EDUCATION.  COPY OF PAIN SCALE GIVEN AND REVIEWED WITH VERBALIZED UNDERSTANDING.

## 2018-03-09 ENCOUNTER — ANESTHESIA EVENT (OUTPATIENT)
Dept: PERIOP | Facility: HOSPITAL | Age: 48
End: 2018-03-09

## 2018-03-09 ENCOUNTER — HOSPITAL ENCOUNTER (OUTPATIENT)
Facility: HOSPITAL | Age: 48
Setting detail: HOSPITAL OUTPATIENT SURGERY
Discharge: HOME | End: 2018-03-09
Attending: SPECIALIST | Admitting: SPECIALIST

## 2018-03-09 ENCOUNTER — ANESTHESIA (OUTPATIENT)
Dept: PERIOP | Facility: HOSPITAL | Age: 48
End: 2018-03-09

## 2018-03-09 VITALS
DIASTOLIC BLOOD PRESSURE: 65 MMHG | SYSTOLIC BLOOD PRESSURE: 122 MMHG | HEART RATE: 56 BPM | TEMPERATURE: 97.4 F | OXYGEN SATURATION: 96 % | RESPIRATION RATE: 16 BRPM

## 2018-03-09 DIAGNOSIS — R10.11 RIGHT UPPER QUADRANT PAIN: ICD-10-CM

## 2018-03-09 PROCEDURE — 25010000002 ONDANSETRON PER 1 MG: Performed by: NURSE ANESTHETIST, CERTIFIED REGISTERED

## 2018-03-09 PROCEDURE — 25010000002 PROPOFOL 10 MG/ML EMULSION: Performed by: NURSE ANESTHETIST, CERTIFIED REGISTERED

## 2018-03-09 PROCEDURE — 25010000002 DEXAMETHASONE PER 1 MG: Performed by: ANESTHESIOLOGY

## 2018-03-09 PROCEDURE — 25010000002 FENTANYL CITRATE (PF) 250 MCG/5ML SOLUTION: Performed by: NURSE ANESTHETIST, CERTIFIED REGISTERED

## 2018-03-09 PROCEDURE — 25010000002 MIDAZOLAM PER 1 MG: Performed by: ANESTHESIOLOGY

## 2018-03-09 PROCEDURE — 25010000002 NEOSTIGMINE PER 0.5 MG: Performed by: NURSE ANESTHETIST, CERTIFIED REGISTERED

## 2018-03-09 PROCEDURE — 88304 TISSUE EXAM BY PATHOLOGIST: CPT | Performed by: SPECIALIST

## 2018-03-09 PROCEDURE — 25010000002 ONDANSETRON PER 1 MG: Performed by: ANESTHESIOLOGY

## 2018-03-09 PROCEDURE — 25010000002 HYDROMORPHONE PER 4 MG: Performed by: ANESTHESIOLOGY

## 2018-03-09 RX ORDER — IPRATROPIUM BROMIDE AND ALBUTEROL SULFATE 2.5; .5 MG/3ML; MG/3ML
3 SOLUTION RESPIRATORY (INHALATION) ONCE AS NEEDED
Status: DISCONTINUED | OUTPATIENT
Start: 2018-03-09 | End: 2018-03-09 | Stop reason: HOSPADM

## 2018-03-09 RX ORDER — SODIUM CHLORIDE 0.9 % (FLUSH) 0.9 %
3 SYRINGE (ML) INJECTION AS NEEDED
Status: DISCONTINUED | OUTPATIENT
Start: 2018-03-09 | End: 2018-03-09 | Stop reason: HOSPADM

## 2018-03-09 RX ORDER — MEPERIDINE HYDROCHLORIDE 25 MG/ML
12.5 INJECTION INTRAMUSCULAR; INTRAVENOUS; SUBCUTANEOUS
Status: DISCONTINUED | OUTPATIENT
Start: 2018-03-09 | End: 2018-03-09 | Stop reason: HOSPADM

## 2018-03-09 RX ORDER — MIDAZOLAM HYDROCHLORIDE 1 MG/ML
1 INJECTION INTRAMUSCULAR; INTRAVENOUS
Status: DISCONTINUED | OUTPATIENT
Start: 2018-03-09 | End: 2018-03-09 | Stop reason: HOSPADM

## 2018-03-09 RX ORDER — DEXAMETHASONE SODIUM PHOSPHATE 4 MG/ML
4 INJECTION, SOLUTION INTRA-ARTICULAR; INTRALESIONAL; INTRAMUSCULAR; INTRAVENOUS; SOFT TISSUE ONCE AS NEEDED
Status: COMPLETED | OUTPATIENT
Start: 2018-03-09 | End: 2018-03-09

## 2018-03-09 RX ORDER — PROPOFOL 10 MG/ML
VIAL (ML) INTRAVENOUS AS NEEDED
Status: DISCONTINUED | OUTPATIENT
Start: 2018-03-09 | End: 2018-03-09 | Stop reason: SURG

## 2018-03-09 RX ORDER — GLYCOPYRROLATE 0.2 MG/ML
INJECTION INTRAMUSCULAR; INTRAVENOUS AS NEEDED
Status: DISCONTINUED | OUTPATIENT
Start: 2018-03-09 | End: 2018-03-09 | Stop reason: SURG

## 2018-03-09 RX ORDER — ROCURONIUM BROMIDE 10 MG/ML
INJECTION, SOLUTION INTRAVENOUS AS NEEDED
Status: DISCONTINUED | OUTPATIENT
Start: 2018-03-09 | End: 2018-03-09 | Stop reason: SURG

## 2018-03-09 RX ORDER — SODIUM CHLORIDE, SODIUM LACTATE, POTASSIUM CHLORIDE, CALCIUM CHLORIDE 600; 310; 30; 20 MG/100ML; MG/100ML; MG/100ML; MG/100ML
1000 INJECTION, SOLUTION INTRAVENOUS CONTINUOUS
Status: DISCONTINUED | OUTPATIENT
Start: 2018-03-09 | End: 2018-03-09 | Stop reason: HOSPADM

## 2018-03-09 RX ORDER — FENTANYL CITRATE 50 UG/ML
INJECTION, SOLUTION INTRAMUSCULAR; INTRAVENOUS AS NEEDED
Status: DISCONTINUED | OUTPATIENT
Start: 2018-03-09 | End: 2018-03-09 | Stop reason: SURG

## 2018-03-09 RX ORDER — MAGNESIUM HYDROXIDE 1200 MG/15ML
LIQUID ORAL AS NEEDED
Status: DISCONTINUED | OUTPATIENT
Start: 2018-03-09 | End: 2018-03-09 | Stop reason: HOSPADM

## 2018-03-09 RX ORDER — TRAMADOL HYDROCHLORIDE 50 MG/1
50 TABLET ORAL EVERY 6 HOURS PRN
Status: DISCONTINUED | OUTPATIENT
Start: 2018-03-09 | End: 2018-03-09 | Stop reason: HOSPADM

## 2018-03-09 RX ORDER — LIDOCAINE HYDROCHLORIDE 40 MG/ML
SOLUTION TOPICAL AS NEEDED
Status: DISCONTINUED | OUTPATIENT
Start: 2018-03-09 | End: 2018-03-09 | Stop reason: SURG

## 2018-03-09 RX ORDER — SODIUM CHLORIDE, SODIUM LACTATE, POTASSIUM CHLORIDE, CALCIUM CHLORIDE 600; 310; 30; 20 MG/100ML; MG/100ML; MG/100ML; MG/100ML
100 INJECTION, SOLUTION INTRAVENOUS CONTINUOUS
Status: DISCONTINUED | OUTPATIENT
Start: 2018-03-09 | End: 2018-03-09 | Stop reason: HOSPADM

## 2018-03-09 RX ORDER — SODIUM CHLORIDE 9 MG/ML
INJECTION, SOLUTION INTRAVENOUS AS NEEDED
Status: DISCONTINUED | OUTPATIENT
Start: 2018-03-09 | End: 2018-03-09 | Stop reason: HOSPADM

## 2018-03-09 RX ORDER — MORPHINE SULFATE 2 MG/ML
2 INJECTION, SOLUTION INTRAMUSCULAR; INTRAVENOUS AS NEEDED
Status: DISCONTINUED | OUTPATIENT
Start: 2018-03-09 | End: 2018-03-09 | Stop reason: HOSPADM

## 2018-03-09 RX ORDER — LABETALOL HYDROCHLORIDE 5 MG/ML
5 INJECTION, SOLUTION INTRAVENOUS
Status: DISCONTINUED | OUTPATIENT
Start: 2018-03-09 | End: 2018-03-09 | Stop reason: HOSPADM

## 2018-03-09 RX ORDER — HYDRALAZINE HYDROCHLORIDE 20 MG/ML
5 INJECTION INTRAMUSCULAR; INTRAVENOUS
Status: DISCONTINUED | OUTPATIENT
Start: 2018-03-09 | End: 2018-03-09 | Stop reason: HOSPADM

## 2018-03-09 RX ORDER — ONDANSETRON 2 MG/ML
4 INJECTION INTRAMUSCULAR; INTRAVENOUS AS NEEDED
Status: DISCONTINUED | OUTPATIENT
Start: 2018-03-09 | End: 2018-03-09 | Stop reason: HOSPADM

## 2018-03-09 RX ORDER — BUPIVACAINE HYDROCHLORIDE AND EPINEPHRINE 2.5; 5 MG/ML; UG/ML
INJECTION, SOLUTION INFILTRATION; PERINEURAL AS NEEDED
Status: DISCONTINUED | OUTPATIENT
Start: 2018-03-09 | End: 2018-03-09 | Stop reason: HOSPADM

## 2018-03-09 RX ORDER — FLUMAZENIL 0.1 MG/ML
0.2 INJECTION INTRAVENOUS AS NEEDED
Status: DISCONTINUED | OUTPATIENT
Start: 2018-03-09 | End: 2018-03-09 | Stop reason: HOSPADM

## 2018-03-09 RX ORDER — SODIUM CHLORIDE 0.9 % (FLUSH) 0.9 %
1-10 SYRINGE (ML) INJECTION AS NEEDED
Status: DISCONTINUED | OUTPATIENT
Start: 2018-03-09 | End: 2018-03-09 | Stop reason: HOSPADM

## 2018-03-09 RX ORDER — NALOXONE HCL 0.4 MG/ML
0.04 VIAL (ML) INJECTION AS NEEDED
Status: DISCONTINUED | OUTPATIENT
Start: 2018-03-09 | End: 2018-03-09 | Stop reason: HOSPADM

## 2018-03-09 RX ORDER — ONDANSETRON 2 MG/ML
INJECTION INTRAMUSCULAR; INTRAVENOUS AS NEEDED
Status: DISCONTINUED | OUTPATIENT
Start: 2018-03-09 | End: 2018-03-09 | Stop reason: SURG

## 2018-03-09 RX ORDER — NITROFURANTOIN MACROCRYSTALS 100 MG/1
100 CAPSULE ORAL 2 TIMES DAILY
COMMUNITY
End: 2018-11-01

## 2018-03-09 RX ORDER — METOCLOPRAMIDE HYDROCHLORIDE 5 MG/ML
5 INJECTION INTRAMUSCULAR; INTRAVENOUS
Status: DISCONTINUED | OUTPATIENT
Start: 2018-03-09 | End: 2018-03-09 | Stop reason: HOSPADM

## 2018-03-09 RX ORDER — MIDAZOLAM HYDROCHLORIDE 1 MG/ML
2 INJECTION INTRAMUSCULAR; INTRAVENOUS
Status: DISCONTINUED | OUTPATIENT
Start: 2018-03-09 | End: 2018-03-09 | Stop reason: HOSPADM

## 2018-03-09 RX ORDER — PHENYLEPHRINE HCL IN 0.9% NACL 0.8MG/10ML
SYRINGE (ML) INTRAVENOUS AS NEEDED
Status: DISCONTINUED | OUTPATIENT
Start: 2018-03-09 | End: 2018-03-09 | Stop reason: SURG

## 2018-03-09 RX ORDER — LIDOCAINE HYDROCHLORIDE 20 MG/ML
INJECTION, SOLUTION INFILTRATION; PERINEURAL AS NEEDED
Status: DISCONTINUED | OUTPATIENT
Start: 2018-03-09 | End: 2018-03-09 | Stop reason: SURG

## 2018-03-09 RX ORDER — TRAMADOL HYDROCHLORIDE 50 MG/1
50 TABLET ORAL EVERY 6 HOURS PRN
Qty: 30 TABLET | Refills: 0 | Status: SHIPPED | OUTPATIENT
Start: 2018-03-09 | End: 2018-03-19

## 2018-03-09 RX ADMIN — MIDAZOLAM HYDROCHLORIDE 2 MG: 1 INJECTION, SOLUTION INTRAMUSCULAR; INTRAVENOUS at 12:32

## 2018-03-09 RX ADMIN — Medication 3 MG: at 13:21

## 2018-03-09 RX ADMIN — Medication 160 MCG: at 13:23

## 2018-03-09 RX ADMIN — ONDANSETRON 4 MG: 2 INJECTION INTRAMUSCULAR; INTRAVENOUS at 13:54

## 2018-03-09 RX ADMIN — GLYCOPYRROLATE 0.4 MG: 0.2 INJECTION, SOLUTION INTRAMUSCULAR; INTRAVENOUS at 13:21

## 2018-03-09 RX ADMIN — Medication 1 G: at 13:01

## 2018-03-09 RX ADMIN — HYDROMORPHONE HYDROCHLORIDE 0.5 MG: 1 INJECTION, SOLUTION INTRAMUSCULAR; INTRAVENOUS; SUBCUTANEOUS at 13:57

## 2018-03-09 RX ADMIN — HYDROMORPHONE HYDROCHLORIDE 0.5 MG: 1 INJECTION, SOLUTION INTRAMUSCULAR; INTRAVENOUS; SUBCUTANEOUS at 13:54

## 2018-03-09 RX ADMIN — DEXAMETHASONE SODIUM PHOSPHATE 4 MG: 4 INJECTION, SOLUTION INTRA-ARTICULAR; INTRALESIONAL; INTRAMUSCULAR; INTRAVENOUS; SOFT TISSUE at 12:32

## 2018-03-09 RX ADMIN — FENTANYL CITRATE 50 MCG: 50 INJECTION INTRAMUSCULAR; INTRAVENOUS at 13:12

## 2018-03-09 RX ADMIN — PROPOFOL 120 MG: 10 INJECTION, EMULSION INTRAVENOUS at 12:56

## 2018-03-09 RX ADMIN — LIDOCAINE HYDROCHLORIDE 1 EACH: 40 SOLUTION TOPICAL at 12:58

## 2018-03-09 RX ADMIN — ONDANSETRON HYDROCHLORIDE 4 MG: 2 SOLUTION INTRAMUSCULAR; INTRAVENOUS at 13:17

## 2018-03-09 RX ADMIN — TRAMADOL HYDROCHLORIDE 50 MG: 50 TABLET, COATED ORAL at 15:11

## 2018-03-09 RX ADMIN — FENTANYL CITRATE 100 MCG: 50 INJECTION INTRAMUSCULAR; INTRAVENOUS at 12:53

## 2018-03-09 RX ADMIN — LIDOCAINE HYDROCHLORIDE 100 MG: 20 INJECTION, SOLUTION INFILTRATION; PERINEURAL at 12:56

## 2018-03-09 RX ADMIN — Medication 80 MCG: at 13:02

## 2018-03-09 RX ADMIN — LIDOCAINE HYDROCHLORIDE 0.5 ML: 10 INJECTION, SOLUTION EPIDURAL; INFILTRATION; INTRACAUDAL; PERINEURAL at 08:47

## 2018-03-09 RX ADMIN — Medication 160 MCG: at 13:21

## 2018-03-09 RX ADMIN — Medication 80 MCG: at 13:04

## 2018-03-09 RX ADMIN — Medication 160 MCG: at 13:07

## 2018-03-09 RX ADMIN — FENTANYL CITRATE 50 MCG: 50 INJECTION INTRAMUSCULAR; INTRAVENOUS at 13:32

## 2018-03-09 RX ADMIN — SODIUM CHLORIDE, POTASSIUM CHLORIDE, SODIUM LACTATE AND CALCIUM CHLORIDE 1000 ML: 600; 310; 30; 20 INJECTION, SOLUTION INTRAVENOUS at 08:47

## 2018-03-09 RX ADMIN — ROCURONIUM BROMIDE 30 MG: 10 INJECTION INTRAVENOUS at 12:56

## 2018-03-09 NOTE — DISCHARGE INSTRUCTIONS

## 2018-03-09 NOTE — ANESTHESIA PROCEDURE NOTES
Airway  Urgency: elective    Airway not difficult    General Information and Staff    Patient location during procedure: OR  CRNA: HUANG CORDOVA    Indications and Patient Condition  Indications for airway management: airway protection    Preoxygenated: yes  Mask difficulty assessment: 1 - vent by mask    Final Airway Details  Final airway type: endotracheal airway      Successful airway: ETT  Cuffed: yes   Successful intubation technique: direct laryngoscopy  Endotracheal tube insertion site: oral  Blade: Melendez  Blade size: #2  ETT size: 7.0 mm  Cormack-Lehane Classification: grade I - full view of glottis  Placement verified by: chest auscultation and capnometry   Cuff volume (mL): 8  Measured from: lips  ETT to lips (cm): 22  Number of attempts at approach: 1    Additional Comments  Atraumatic

## 2018-03-09 NOTE — PLAN OF CARE
Problem: Patient Care Overview (Adult)  Goal: Plan of Care Review  Outcome: Outcome(s) achieved Date Met: 03/09/18 03/09/18 9116   Coping/Psychosocial Response Interventions   Plan Of Care Reviewed With patient;family   Patient Care Overview   Progress improving   Outcome Evaluation   Outcome Summary/Follow up Plan Patient meets discharge criteria and is ready for discharge.     Goal: Adult Individualization and Mutuality  Outcome: Outcome(s) achieved Date Met: 03/09/18    Goal: Discharge Needs Assessment  Outcome: Outcome(s) achieved Date Met: 03/09/18      Problem: Perioperative Period (Adult)  Goal: Signs and Symptoms of Listed Potential Problems Will be Absent or Manageable (Perioperative Period)  Outcome: Outcome(s) achieved Date Met: 03/09/18

## 2018-03-09 NOTE — ANESTHESIA POSTPROCEDURE EVALUATION
Patient: Yesi Bowman    Procedure Summary     Date Anesthesia Start Anesthesia Stop Room / Location    03/09/18 1253 [id number] [facility] OR 09 / [facility] OR       Procedure Diagnosis Surgeon Provider    CHOLECYSTECTOMY LAPAROSCOPIC  (N/A Abdomen) (BILIARY DYSKINESIA) MD Chelsey Graves, BAM          Anesthesia Type: general  Last vitals  BP   122/65 (03/09/18 1545)   Temp   97.4 °F (36.3 °C) (03/09/18 1416)   Pulse   56 (03/09/18 1545)   Resp   16 (03/09/18 1545)     SpO2   96 % (03/09/18 1545)     Post Anesthesia Care and Evaluation    Patient location during evaluation: PACU  Patient participation: complete - patient participated  Level of consciousness: awake and alert  Pain management: adequate  Airway patency: patent  Anesthetic complications: No anesthetic complications    Cardiovascular status: acceptable  Respiratory status: acceptable  Hydration status: acceptable    Comments: Blood pressure 122/65, pulse 56, temperature 97.4 °F (36.3 °C), resp. rate 16, SpO2 96 %, not currently breastfeeding.    Pt discharged from PACU based on donna score >8

## 2018-03-09 NOTE — ANESTHESIA PREPROCEDURE EVALUATION
Anesthesia Evaluation     Patient summary reviewed   no history of anesthetic complications:  NPO Solid Status: > 8 hours  NPO Liquid Status: > 8 hours           Airway   Mallampati: I  TM distance: >3 FB  Neck ROM: full  No difficulty expected  Dental - normal exam     Pulmonary - normal exam   (+) a smoker Former,   (-) asthma, sleep apnea  Cardiovascular - normal exam  Exercise tolerance: good (4-7 METS)    ECG reviewed    (-) hypertension, past MI, CAD, angina      Neuro/Psych  (-) seizures, TIA, CVA  GI/Hepatic/Renal/Endo    (-) liver disease, no renal disease, diabetes    Musculoskeletal     Abdominal    Substance History      OB/GYN          Other      history of cancer (cervical)                    Anesthesia Plan    ASA 2     general     intravenous induction   Anesthetic plan and risks discussed with patient.

## 2018-03-09 NOTE — PLAN OF CARE
Problem: Patient Care Overview (Adult)  Goal: Plan of Care Review  Outcome: Ongoing (interventions implemented as appropriate)   03/09/18 1213   Coping/Psychosocial Response Interventions   Plan Of Care Reviewed With patient   Patient Care Overview   Progress no change       Problem: Perioperative Period (Adult)  Goal: Signs and Symptoms of Listed Potential Problems Will be Absent or Manageable (Perioperative Period)  Outcome: Ongoing (interventions implemented as appropriate)   03/09/18 1213   Perioperative Period   Problems Assessed (Perioperative Period) pain;perioperative injury;infection;situational response   Problems Present (Perioperative Period) situational response

## 2018-03-09 NOTE — OP NOTE
Preoperative diagnosis:  Biliary colic  Postoperative diagnosis: same  Procedure: laparoscopic cholecystectomy  Surgeon:  Karla Lee MD  Anesthesia:  Get  Ebl:  Minimal  Ivf:  See anesthesia notes  Indications:  The patient is a 47-year-old female who presents for lap lianna.  The risks, benefits, complications, and possible alternatives were discussed with the patient who agreed to proceed.  Description of procedure:  The patient was laid supine.  The abdomen was prepped and draped.  The abdomen was entered with veress needle.  Trocars were placed.  The fundus and infundibulum were grasped and elevated.  The cystic duct and artery were skeletonized and identified. They were clipped proximally and distally and transected.  The gallbladder was freed from the liver bed with bovie.  The gallbaldder was placed in an endocatch bag and removed.  The fascia of the epigastric site was closed with 0 vicryl using an endostitch passer.  The skin was closed with 3-0 and 4-0 vicryl.  The sponge, needle, and instrument counts were corret.  Complications:  None  Disposition:  Good to pacu  Findings:  Mildly inflamed

## 2018-03-13 LAB
CYTO UR: NORMAL
LAB AP CASE REPORT: NORMAL
Lab: NORMAL
PATH REPORT.FINAL DX SPEC: NORMAL
PATH REPORT.GROSS SPEC: NORMAL

## 2018-03-26 DIAGNOSIS — C53.9 MALIGNANT NEOPLASM OF CERVIX, UNSPECIFIED SITE (HCC): Primary | ICD-10-CM

## 2018-03-27 ENCOUNTER — APPOINTMENT (OUTPATIENT)
Dept: LAB | Facility: HOSPITAL | Age: 48
End: 2018-03-27

## 2018-03-27 ENCOUNTER — OFFICE VISIT (OUTPATIENT)
Dept: ONCOLOGY | Facility: CLINIC | Age: 48
End: 2018-03-27

## 2018-03-27 VITALS
WEIGHT: 182 LBS | TEMPERATURE: 98.1 F | BODY MASS INDEX: 27.58 KG/M2 | DIASTOLIC BLOOD PRESSURE: 72 MMHG | HEART RATE: 62 BPM | RESPIRATION RATE: 18 BRPM | SYSTOLIC BLOOD PRESSURE: 124 MMHG | HEIGHT: 68 IN | OXYGEN SATURATION: 99 %

## 2018-03-27 DIAGNOSIS — C53.9 MALIGNANT NEOPLASM OF CERVIX, UNSPECIFIED SITE (HCC): Primary | ICD-10-CM

## 2018-03-27 LAB
ALBUMIN SERPL-MCNC: 4.4 G/DL (ref 3.5–5)
ALBUMIN/GLOB SERPL: 1.4 G/DL (ref 1.1–2.5)
ALP SERPL-CCNC: 53 U/L (ref 24–120)
ALT SERPL W P-5'-P-CCNC: 25 U/L (ref 0–54)
ANION GAP SERPL CALCULATED.3IONS-SCNC: 9 MMOL/L (ref 4–13)
AST SERPL-CCNC: 22 U/L (ref 7–45)
BASOPHILS # BLD AUTO: 0.03 10*3/MM3 (ref 0–0.2)
BASOPHILS NFR BLD AUTO: 0.6 % (ref 0–2)
BILIRUB SERPL-MCNC: 0.3 MG/DL (ref 0.1–1)
BUN BLD-MCNC: 12 MG/DL (ref 5–21)
BUN/CREAT SERPL: 16.7 (ref 7–25)
CALCIUM SPEC-SCNC: 10.2 MG/DL (ref 8.4–10.4)
CHLORIDE SERPL-SCNC: 102 MMOL/L (ref 98–110)
CO2 SERPL-SCNC: 32 MMOL/L (ref 24–31)
CREAT BLD-MCNC: 0.72 MG/DL (ref 0.5–1.4)
DEPRECATED RDW RBC AUTO: 42.9 FL (ref 40–54)
EOSINOPHIL # BLD AUTO: 0.07 10*3/MM3 (ref 0–0.7)
EOSINOPHIL NFR BLD AUTO: 1.4 % (ref 0–4)
ERYTHROCYTE [DISTWIDTH] IN BLOOD BY AUTOMATED COUNT: 13 % (ref 12–15)
GFR SERPL CREATININE-BSD FRML MDRD: 88 ML/MIN/1.73
GLOBULIN UR ELPH-MCNC: 3.2 GM/DL
GLUCOSE BLD-MCNC: 81 MG/DL (ref 70–100)
HCT VFR BLD AUTO: 37.8 % (ref 37–47)
HGB BLD-MCNC: 12.5 G/DL (ref 12–16)
HOLD SPECIMEN: NORMAL
HOLD SPECIMEN: NORMAL
IMM GRANULOCYTES # BLD: 0.01 10*3/MM3 (ref 0–0.03)
IMM GRANULOCYTES NFR BLD: 0.2 % (ref 0–5)
LYMPHOCYTES # BLD AUTO: 1.28 10*3/MM3 (ref 0.72–4.86)
LYMPHOCYTES NFR BLD AUTO: 26.2 % (ref 15–45)
MCH RBC QN AUTO: 30 PG (ref 28–32)
MCHC RBC AUTO-ENTMCNC: 33.1 G/DL (ref 33–36)
MCV RBC AUTO: 90.6 FL (ref 82–98)
MONOCYTES # BLD AUTO: 0.31 10*3/MM3 (ref 0.19–1.3)
MONOCYTES NFR BLD AUTO: 6.3 % (ref 4–12)
NEUTROPHILS # BLD AUTO: 3.19 10*3/MM3 (ref 1.87–8.4)
NEUTROPHILS NFR BLD AUTO: 65.3 % (ref 39–78)
NRBC BLD MANUAL-RTO: 0 /100 WBC (ref 0–0)
PLATELET # BLD AUTO: 236 10*3/MM3 (ref 130–400)
PMV BLD AUTO: 9 FL (ref 6–12)
POTASSIUM BLD-SCNC: 4 MMOL/L (ref 3.5–5.3)
PROT SERPL-MCNC: 7.6 G/DL (ref 6.3–8.7)
RBC # BLD AUTO: 4.17 10*6/MM3 (ref 4.2–5.4)
SODIUM BLD-SCNC: 143 MMOL/L (ref 135–145)
WBC NRBC COR # BLD: 4.89 10*3/MM3 (ref 4.8–10.8)

## 2018-03-27 PROCEDURE — 85025 COMPLETE CBC W/AUTO DIFF WBC: CPT | Performed by: INTERNAL MEDICINE

## 2018-03-27 PROCEDURE — 99213 OFFICE O/P EST LOW 20 MIN: CPT | Performed by: INTERNAL MEDICINE

## 2018-03-27 PROCEDURE — 80053 COMPREHEN METABOLIC PANEL: CPT | Performed by: INTERNAL MEDICINE

## 2018-03-27 PROCEDURE — 36415 COLL VENOUS BLD VENIPUNCTURE: CPT

## 2018-03-29 NOTE — PROGRESS NOTES
[facility]  HEMATOLOGY & ONCOLOGY    Cancer Staging Information:  No matching staging information was found for the patient.      Subjective     VISIT DIAGNOSIS:   Encounter Diagnosis   Name Primary?   • Malignant neoplasm of cervix, unspecified site Yes       REASON FOR VISIT:     Chief Complaint   Patient presents with   • Follow-up        HEMATOLOGY / ONCOLOGY HISTORY:      Cervical cancer     Initial Diagnosis     Cervical cancer         2013 Surgery     Patient Name: YESI BOWMAN  MR#: [id number]  : 1970  Gender: F   Ordering Physician: Ning Hernandez M.D.  Copy To:    Accession #: [id number]  Location: [address] [place] [address]  Encounter #: [id number]  Collected: 2013  Received: 2013  Reported: 2013    Clinical Diagnosis and History  Pre-operative diagnosis: Menorrhagia.        Final Diagnosis  Cervical biopsy:        Atypical papillary neoplastic structure, favor  adenocarcinoma (see microscopic description).     Electronically Signed Out By  Kit Worrell M.D.         2014 - 2014 Radiation     Radiation OncologyTreatment Course:  Yesi Bowman received 5040 cGy in 28 fractions to cervix and 3000 cGy via HDRs x 5.           2014 Procedure     Pap Smear:  OTHER INTERPRETATIONS:  Reactive changes associated with radiation.    FINAL CYTOLOGIC DIAGNOSIS:  Negative for Intraepithelial lesions or malignancy         2014 Imaging     CT Abd/Pelvis:  IMPRESSION-  1. No CT evidence of neoplastic disease.  2. 1.0 cm nodular pleural thickening, right lung base posteromedially  near the spine, stable compared to the 2013 examination.  3. Changes from hysterectomy. Mild fluid or induration of the presacral  fat, likely related to previous therapy.  4. Mild circumferential bowel wall prominence of the sigmoid colon which  may be artifactual related to lack of distention. Hypertrophy related to  mild diverticulosis may also be considered. Minimal colitis changes  would  be difficult to totally exclude, however there are no secondary  signs of inflammation. Correlate with patient's symptomatology.  5. Right-sided sacralization of the L5 vertebral body         11/12/2014 Imaging     CT Abd/Pelvis:  IMPRESSION-   1. Diverticulosis. No acute intra-abdominal or pelvic abnormalities  identified.   2. Incidental note is made of sacralization of the right side of L5.    CT Chest:  IMPRESSION- Negative enhanced CT of the chest.             12/30/2014 Imaging     CT Abd/Pelvis:  IMPRESSION-  1. Changes from hysterectomy. Mild induration of the presacral and  perirectal fat with a small amount of fluid in the presacral space,  unchanged. Suspect posttherapy in nature.  2. Mildly prominent pelvic lymph node, left iliac chain. Metastatic  lymphadenopathy not excluded. PET CT characterization suggested if  clinically indicated.  3. No CT evidence of an acute intra-abdominal/pelvic pathological  process, otherwise.             12/31/2014 Imaging     PET/CT:  IMPRESSION-  1. Mild increased PET scan activity identified in the left pelvic lymph  node with a maximum intensity of 1.3 SUV, likely reactive, not typical  of neoplastic disease.  2. Hypermetabolic change in the cervix compatible with the patient's  history of cervical cancer.  3. No evidence of distal metastases         4/10/2015 Procedure     Pap Smear:  OTHER INTERPRETATIONS:  Reactive changes associated with radiation.  Inflammation.  Metaplasia.    FINAL CYTOLOGIC DIAGNOSIS:  Negative for Intraepithelial lesions or malignancy         11/11/2015 Imaging     CT Chest:  IMPRESSION-   1. Unremarkable CT of the chest.    CT Abd/Pelvis:      IMPRESSION-   1. No evidence of local recurrence or metastasis.   2. Soft tissue density overlying the RIGHT gluteus muscle. This is  likely from a recent injection. If no recent injection or trauma has  occurred, and ultrasound of this area could be obtained to assess for a  solid lesion.              2/15/2016 Procedure     Pap Smear:  OTHER INTERPRETATIONS:                           Reactive changes associated with radiation.                           Metaplasia.                                                      FINAL CYTOLOGIC DIAGNOSIS:                           Negative for Intraepithelial lesions or malignancy         2016 Imaging     CT Abd/Pelvis:  IMPRESSION-   Prior hysterectomy. Thickening of the wall of the sigmoid and rectum. No  other abnormalities are identified.             2016 Imaging     CT Abd/Pelvis:  Summary-  1. Acute appendicitis.         2016 Procedure     Pap Smear:  Interpretation   Negative for intraepithelial lesion or malignancy              3/3/2017 Procedure     Pap Smear:  Interpretation   Negative for intraepithelial lesion or malignancy                     INTERVAL HISTORY  Patient ID: Yesi Bowman is a 47 y.o. year old female stage II cervical ca. Chronic dyspareunia due to stenosis, no dysuria, hematuria, overt bleeding or mass noted. She has not been to a gynecologist in a long time. Denies any MICHEAL or night sweats.    Past Medical History:   Past Medical History:   Diagnosis Date   • Alcoholism    • Anemia    • Cervical cancer    • Depression    • Drug therapy    • History of radiation therapy     5040 cGy to cervix + 3000 cGy (HDRs x 5)   • History of transfusion    • Hx of colonic polyps    • Hx of radiation therapy    • Substance abuse      Past Surgical History:   Past Surgical History:   Procedure Laterality Date   • APPENDECTOMY     • CERVICAL BIOPSY     •  SECTION     • CHOLECYSTECTOMY WITH INTRAOPERATIVE CHOLANGIOGRAM N/A 3/9/2018    Procedure: CHOLECYSTECTOMY LAPAROSCOPIC ;  Surgeon: Karla Lee MD;  Location: [facility];  Service:    • COLONOSCOPY  2016    Hyperplastic polyp at 20 cm repeat exam in 5 years   • CYSTOSCOPY      Diagnostic   • DILATATION AND CURETTAGE     • HYSTERECTOMY     • KNEE SURGERY     • OOPHORECTOMY     •  PELVIC EXAMINATION UNDER ANESTHESIA     • TONSILLECTOMY     • TOTAL LAPAROSCOPIC HYSTERECTOMY WITH DAVINCI ROBOT       Social History:   Social History     Social History   • Marital status:      Spouse name: N/A   • Number of children: N/A   • Years of education: N/A     Occupational History   • Not on file.     Social History Main Topics   • Smoking status: Former Smoker     Packs/day: 0.50     Types: Cigarettes     Quit date: 12/26/2016   • Smokeless tobacco: Never Used   • Alcohol use No   • Drug use: No   • Sexual activity: No     Other Topics Concern   • Not on file     Social History Narrative   • No narrative on file     Family History:   Family History   Problem Relation Age of Onset   • Skin cancer Mother    • Cancer Maternal Grandmother      Malignancy   • Cancer Paternal Grandmother      Malignancy   • Colon cancer Neg Hx    • Colon polyps Neg Hx        Review of Systems   Constitutional: Negative.    HENT: Negative.    Eyes: Negative.    Respiratory: Negative.    Cardiovascular: Negative.    Gastrointestinal: Negative.    Endocrine: Negative.    Genitourinary: Negative.    Musculoskeletal: Negative.    Skin: Negative.    Allergic/Immunologic: Negative.    Neurological: Negative.    Hematological: Negative.    Psychiatric/Behavioral: Negative.         Performance Status:  Asymptomatic    Medications:    Current Outpatient Prescriptions   Medication Sig Dispense Refill   • atomoxetine (STRATTERA) 40 MG capsule Take 40 mg by mouth Daily.     • cholecalciferol (VITAMIN D3) 1000 units tablet Take 1,000 Units by mouth Daily.     • Cyanocobalamin (VITAMIN B 12 PO) Take 1 tablet by mouth Daily.     • NON FORMULARY Take 1 tablet by mouth Daily. Vitamin B 6     • Omega-3 Fatty Acids (FISH OIL) 1000 MG capsule capsule Take 1,000 mg by mouth Daily With Breakfast.     • sertraline (ZOLOFT) 100 MG tablet Take 100 mg by mouth Daily.     • traZODone (DESYREL) 150 MG tablet Take 50 mg by mouth Every Night.     •  "nitrofurantoin (MACRODANTIN) 100 MG capsule Take 100 mg by mouth 2 (Two) Times a Day. UTI       No current facility-administered medications for this visit.        ALLERGIES:  No Known Allergies    Objective      Vitals:    03/27/18 1530   BP: 124/72   Pulse: 62   Resp: 18   Temp: 98.1 °F (36.7 °C)   TempSrc: Tympanic   SpO2: 99%   Weight: 82.6 kg (182 lb)   Height: 173 cm (68.11\")         Current Status 3/27/2018   ECOG score 0         Physical Exam    General Appearance: Patient is awake, alert, oriented and in no acute distress. Patient is welldeveloped, wellnourished, and appears stated age.  HEENT: Normocephalic. Sclerae clear, conjunctiva pink, extraocular movements intact, pupils, round, reactive to light and  accommodation. Mouth and throat are clear with moist oral mucosa.  NECK: Supple, no jugular venous distention, thyroid not enlarged.  LYMPH: No cervical, supraclavicular, axillary, or inguinal lymphadenopathy.  CHEST: Equal bilateral expansion, AP  diameter normal, resonant percussion note  LUNGS: Good air movement, no rales, rhonchi, rubs or wheezes with auscultation  CARDIO: Regular sinus rhythm, no murmurs, gallops or rubs.  ABDOMEN: Nondistended, soft, No tenderness, no guarding, no rebound, No hepatosplenomegaly. No abdominal masses. Bowel sounds positive. No hernia  GENITALIA: Not examined.  BREASTS: Not examined.  MUSKEL: No joint swelling, decreased motion, or inflammation  EXTREMS: No edema, clubbing, cyanosis, No varicose veins.  NEURO: Grossly nonfocal, Gait is coordinated and smooth, Cognition is preserved.  SKIN: No rashes, no ecchymoses, no petechia.  PSYCH: Oriented to time, place and person. Memory is preserved. Mood and affect appear normal    RECENT LABS:  Appointment on 03/27/2018   Component Date Value Ref Range Status   • Extra Tube 03/27/2018 Hold for add-ons.   Final   • Extra Tube 03/27/2018 Hold for add-ons.   Final   Orders Only on 03/27/2018   Component Date Value Ref Range " Status   • Glucose 03/27/2018 81  70 - 100 mg/dL Final   • BUN 03/27/2018 12  5 - 21 mg/dL Final   • Creatinine 03/27/2018 0.72  0.50 - 1.40 mg/dL Final   • Sodium 03/27/2018 143  135 - 145 mmol/L Final   • Potassium 03/27/2018 4.0  3.5 - 5.3 mmol/L Final   • Chloride 03/27/2018 102  98 - 110 mmol/L Final   • CO2 03/27/2018 32.0* 24.0 - 31.0 mmol/L Final   • Calcium 03/27/2018 10.2  8.4 - 10.4 mg/dL Final   • Total Protein 03/27/2018 7.6  6.3 - 8.7 g/dL Final   • Albumin 03/27/2018 4.40  3.50 - 5.00 g/dL Final   • ALT (SGPT) 03/27/2018 25  0 - 54 U/L Final   • AST (SGOT) 03/27/2018 22  7 - 45 U/L Final   • Alkaline Phosphatase 03/27/2018 53  24 - 120 U/L Final   • Total Bilirubin 03/27/2018 0.3  0.1 - 1.0 mg/dL Final   • eGFR Non African Amer 03/27/2018 88  >60 mL/min/1.73 Final   • Globulin 03/27/2018 3.2  gm/dL Final   • A/G Ratio 03/27/2018 1.4  1.1 - 2.5 g/dL Final   • BUN/Creatinine Ratio 03/27/2018 16.7  7.0 - 25.0 Final   • Anion Gap 03/27/2018 9.0  4.0 - 13.0 mmol/L Final   • WBC 03/27/2018 4.89  4.80 - 10.80 10*3/mm3 Final   • RBC 03/27/2018 4.17* 4.20 - 5.40 10*6/mm3 Final   • Hemoglobin 03/27/2018 12.5  12.0 - 16.0 g/dL Final   • Hematocrit 03/27/2018 37.8  37.0 - 47.0 % Final   • MCV 03/27/2018 90.6  82.0 - 98.0 fL Final   • MCH 03/27/2018 30.0  28.0 - 32.0 pg Final   • MCHC 03/27/2018 33.1  33.0 - 36.0 g/dL Final   • RDW 03/27/2018 13.0  12.0 - 15.0 % Final   • RDW-SD 03/27/2018 42.9  40.0 - 54.0 fl Final   • MPV 03/27/2018 9.0  6.0 - 12.0 fL Final   • Platelets 03/27/2018 236  130 - 400 10*3/mm3 Final   • Neutrophil % 03/27/2018 65.3  39.0 - 78.0 % Final   • Lymphocyte % 03/27/2018 26.2  15.0 - 45.0 % Final   • Monocyte % 03/27/2018 6.3  4.0 - 12.0 % Final   • Eosinophil % 03/27/2018 1.4  0.0 - 4.0 % Final   • Basophil % 03/27/2018 0.6  0.0 - 2.0 % Final   • Immature Grans % 03/27/2018 0.2  0.0 - 5.0 % Final   • Neutrophils, Absolute 03/27/2018 3.19  1.87 - 8.40 10*3/mm3 Final   • Lymphocytes, Absolute  03/27/2018 1.28  0.72 - 4.86 10*3/mm3 Final   • Monocytes, Absolute 03/27/2018 0.31  0.19 - 1.30 10*3/mm3 Final   • Eosinophils, Absolute 03/27/2018 0.07  0.00 - 0.70 10*3/mm3 Final   • Basophils, Absolute 03/27/2018 0.03  0.00 - 0.20 10*3/mm3 Final   • Immature Grans, Absolute 03/27/2018 0.01  0.00 - 0.03 10*3/mm3 Final   • nRBC 03/27/2018 0.0  0.0 - 0.0 /100 WBC Final       RADIOLOGY:  Ct Abdomen Pelvis With Contrast    Result Date: 2/28/2018  Narrative:  History: 47-year-old evaluated for abdominal pain. History of cervical cancer. Nausea and vomiting.  Reference: CT abdomen pelvis September 2016.  Technique Contrast-enhanced CT abdomen/pelvis was performed with coronal and sagittal reformatted images provided.  For this CT exam, one or more of the following dose reduction techniques was employed: -automated exposure control -mA and/or kVp adjustment for patient size -iterative reconstruction   mGy-cm  Findings  Chest Incidental scanning through the lower chest demonstrates clear lung bases.  Abdomen The liver and spleen are unremarkable. No pancreatic or adrenal abnormality. The gallbladder is unremarkable by CT. Both kidneys appear normal other than a miniscule cyst in the upper left renal pole. Normal caliber abdominal aorta. No lymphadenopathy. Fluid is abnormal present throughout the colonic and rectal lumen. There is no evidence of bowel obstruction. There is no bowel wall thickening or pericolonic inflammatory change. Prior appendectomy.  Pelvis Slight thickening urinary bladder wall similar to prior exam. There is no pelvic free fluid. Prior hysterectomy. No pelvic lymphadenopathy.  No acute osseous findings.      Impression: Fluid throughout the colon and rectum suggesting a diarrheal illness. This report was finalized on 02/28/2018 11:09 by  Aditya Santiago, .    Us Gallbladder    Result Date: 3/2/2018  Narrative: US GALLBLADDER-3/2/2018 6:54 AM CST  REASON FOR EXAM: right upper quadrant abdominal  pain/discomfort; R11.2-Nausea with vomiting, unspecified   COMPARISON: NONE  TECHNIQUE: Multiple longitudinal and transverse realtime sonographic images of the right upper quadrant of the abdomen are obtained.  FINDINGS:  Pancreas: Normal in size and echogenicity.  Liver: Normal in size, echogenicity and echotexture. No focal lesion.  Gallbladder: No intraluminal echoes, wall thickening, or pericholecystic fluid.  Bile ducts: The CBD measures 0.3 cm in diameter. There is no intrahepatic or extrahepatic ductal dilation.  Right kidney: Normal in size, shape and contour. No mass, hydronephrosis or calculi. No perinephric fluid collection.   Other: The visualized abdominal aorta is normal in caliber.      Impression: Normal right upper quadrant ultrasound.   This report was finalized on 03/02/2018 07:55 by Dr. Dmitry Shukla MD.    Nm Hida Scan With Pharmacological Intervention    Result Date: 3/2/2018  Narrative: NM HIDA SCAN WITH PHARMACOLOGICAL INTERVENTION- 3/2/2018 7:31 AM CST  Clinical History: Nausea, vomiting, diarrhea; R11.2-Nausea with vomiting, unspecified  Comparison: None.  Radiopharmaceutical: 5.5 mCi technetium 99m Mebrofenin IV.  Technique: Anterior images were acquired over the upper abdomen for a period of 60 minutes following intravenous administration of the radiopharmaceutical.  Subsequently, 30 mL of corn oil emulsion were administered. Gallbladder ejection fraction was calculated.  Findings:  There is prompt progression of the isotope from the liver into the intrahepatic biliary ducts, common bile duct and gallbladder. Radiotracer is noted in the small bowel at the end of 60 minutes.  Following administration of corn oil emulsion , calculated gallbladder ejection fraction is 22% (normal > 20 %).      Impression: Impression:  Borderline gallbladder ejection fraction.  This report was finalized on 03/02/2018 09:34 by Dr. Nurys Bucio MD.           Assessment/Plan  Yesi Bowman is a 47 y.o. year  old female stage IIb well differentiated cervical adenocarcinoma who is status post radical robotic hysterectomy and bilateral pelvic lymphadenectomy followed by adjuvant radiation therapy to the pelvis + cisplatin chemo as a radiosensitizer. She is currently DM.   1. Advised yearly gyne exam.--reiterated the importance of gyne f/u  2. Depression: on zoloft  3. Chronic pain: On trazodone  4. ADHD: on atomoxitine    Patient Active Problem List   Diagnosis   • Cervical cancer   • History of radiation therapy   • Former smoker   • Encounter for follow-up surveillance of cervical cancer   • Generalized bloating   • Pap smear of vagina with previous hysterectomy for cancer   • Hx of cervical cancer   • Diarrhea   • Bloating   • Generalized abdominal pain   • Nausea and vomiting   • Obesity, unspecified obesity severity, unspecified obesity type   • Nonsmoker            Danyell Ramey MD    3/27/2018    2:43 PM

## 2018-06-26 DIAGNOSIS — C53.9 MALIGNANT NEOPLASM OF CERVIX, UNSPECIFIED SITE (HCC): Primary | ICD-10-CM

## 2018-06-27 ENCOUNTER — APPOINTMENT (OUTPATIENT)
Dept: LAB | Facility: HOSPITAL | Age: 48
End: 2018-06-27

## 2018-08-01 ENCOUNTER — HOSPITAL ENCOUNTER (OUTPATIENT)
Dept: RADIATION ONCOLOGY | Facility: HOSPITAL | Age: 48
Setting detail: RADIATION/ONCOLOGY SERIES
End: 2018-08-01

## 2018-11-01 ENCOUNTER — HOSPITAL ENCOUNTER (OUTPATIENT)
Dept: RADIATION ONCOLOGY | Facility: HOSPITAL | Age: 48
Setting detail: RADIATION/ONCOLOGY SERIES
End: 2018-11-01

## 2018-11-01 ENCOUNTER — OFFICE VISIT (OUTPATIENT)
Dept: RADIATION ONCOLOGY | Facility: HOSPITAL | Age: 48
End: 2018-11-01

## 2018-11-01 VITALS
DIASTOLIC BLOOD PRESSURE: 49 MMHG | WEIGHT: 191.3 LBS | SYSTOLIC BLOOD PRESSURE: 112 MMHG | HEIGHT: 68 IN | BODY MASS INDEX: 28.99 KG/M2

## 2018-11-01 DIAGNOSIS — Z92.3 HISTORY OF RADIATION THERAPY: ICD-10-CM

## 2018-11-01 DIAGNOSIS — Z08 ENCOUNTER FOR FOLLOW-UP SURVEILLANCE OF CERVICAL CANCER: ICD-10-CM

## 2018-11-01 DIAGNOSIS — Z12.4 PAPANICOLAOU SMEAR: ICD-10-CM

## 2018-11-01 DIAGNOSIS — H93.8X3 EAR FULLNESS, BILATERAL: ICD-10-CM

## 2018-11-01 DIAGNOSIS — C53.9 MALIGNANT NEOPLASM OF CERVIX, UNSPECIFIED SITE (HCC): Primary | ICD-10-CM

## 2018-11-01 DIAGNOSIS — Z87.891 FORMER SMOKER: ICD-10-CM

## 2018-11-01 DIAGNOSIS — Z85.41 ENCOUNTER FOR FOLLOW-UP SURVEILLANCE OF CERVICAL CANCER: ICD-10-CM

## 2018-11-01 PROCEDURE — G0463 HOSPITAL OUTPT CLINIC VISIT: HCPCS | Performed by: RADIOLOGY

## 2018-11-01 PROCEDURE — 88142 CYTOPATH C/V THIN LAYER: CPT | Performed by: RADIOLOGY

## 2018-11-01 RX ORDER — THIAMINE MONONITRATE (VIT B1) 100 MG
100 TABLET ORAL DAILY
COMMUNITY
End: 2018-12-06

## 2018-11-02 PROBLEM — H93.8X3 EAR FULLNESS, BILATERAL: Status: ACTIVE | Noted: 2018-11-02

## 2018-11-02 PROBLEM — Z12.4 PAPANICOLAOU SMEAR: Status: ACTIVE | Noted: 2018-11-02

## 2018-12-06 ENCOUNTER — HOSPITAL ENCOUNTER (OUTPATIENT)
Dept: GENERAL RADIOLOGY | Facility: HOSPITAL | Age: 48
Discharge: HOME | End: 2018-12-06
Admitting: FAMILY MEDICINE

## 2018-12-06 ENCOUNTER — OFFICE VISIT (OUTPATIENT)
Dept: FAMILY MEDICINE CLINIC | Facility: CLINIC | Age: 48
End: 2018-12-06

## 2018-12-06 VITALS
BODY MASS INDEX: 29.55 KG/M2 | HEART RATE: 78 BPM | OXYGEN SATURATION: 97 % | HEIGHT: 68 IN | TEMPERATURE: 98.3 F | SYSTOLIC BLOOD PRESSURE: 128 MMHG | RESPIRATION RATE: 18 BRPM | DIASTOLIC BLOOD PRESSURE: 76 MMHG | WEIGHT: 195 LBS

## 2018-12-06 DIAGNOSIS — J06.9 UPPER RESPIRATORY TRACT INFECTION, UNSPECIFIED TYPE: ICD-10-CM

## 2018-12-06 DIAGNOSIS — J40 BRONCHITIS: Primary | ICD-10-CM

## 2018-12-06 DIAGNOSIS — R05.9 COUGH: ICD-10-CM

## 2018-12-06 LAB
EXPIRATION DATE: NORMAL
FLUAV AG NPH QL: NEGATIVE
FLUBV AG NPH QL: NEGATIVE
INTERNAL CONTROL: NORMAL
Lab: NORMAL

## 2018-12-06 PROCEDURE — 87804 INFLUENZA ASSAY W/OPTIC: CPT | Performed by: FAMILY MEDICINE

## 2018-12-06 PROCEDURE — 99203 OFFICE O/P NEW LOW 30 MIN: CPT | Performed by: FAMILY MEDICINE

## 2018-12-06 PROCEDURE — 71046 X-RAY EXAM CHEST 2 VIEWS: CPT

## 2018-12-06 RX ORDER — CEFDINIR 300 MG/1
300 CAPSULE ORAL 2 TIMES DAILY
Qty: 14 CAPSULE | Refills: 0 | Status: SHIPPED | OUTPATIENT
Start: 2018-12-06 | End: 2018-12-13

## 2018-12-06 NOTE — PROGRESS NOTES
Subjective cc: cough   Yesi Bowman is a 48 y.o. female who presents with complaint of cough, fatigue, and generalized not feeling well.  Patient reports her symptoms started over the weekend.  [facility].  She was given a steroid shot.  She was started on clindamycin 300 mg 3 times a day.  She was advised by them that she had fluid on her ears.  She was negative for influenza.  Since that time she reports she is not getting any better.  She continues to feel extremely fatigued.  She is coughing up green sputum.  She complains of being stuffy.  Temperature on Monday was 100.  No chronic lung issues.  She denies any shortness of air or wheezing.  No abdominal pain.  Does have constipation.     H/o cervical cancer - chemo rad in 2014.   No other known medical problems.     URI    This is a new problem. The current episode started in the past 7 days. The problem has been gradually worsening. There has been no fever. The fever has been present for less than 1 day. Associated symptoms include congestion, coughing (productive), headaches and a sore throat. Pertinent negatives include no abdominal pain, chest pain, diarrhea, dysuria, ear pain, joint pain, joint swelling, nausea, neck pain, plugged ear sensation, rash, vomiting or wheezing. Treatments tried: Steroid shot, antibiotic, Flonase, Zyrtec. The treatment provided no relief.        The following portions of the patient's history were reviewed and updated as appropriate: allergies, current medications, past family history, past medical history, past social history, past surgical history and problem list.        Review of Systems   Constitutional: Positive for activity change, appetite change, chills, fatigue and fever.   HENT: Positive for congestion and sore throat. Negative for ear pain.    Respiratory: Positive for cough (productive). Negative for shortness of breath and wheezing.    Cardiovascular: Negative for chest pain.  "  Gastrointestinal: Positive for constipation. Negative for abdominal pain, diarrhea, nausea and vomiting.   Genitourinary: Negative for dysuria.   Musculoskeletal: Negative for joint pain and neck pain.   Skin: Negative for rash.   Neurological: Positive for headaches.       Objective   Blood pressure 128/76, pulse 78, temperature 98.3 °F (36.8 °C), temperature source Oral, resp. rate 18, height 172.7 cm (68\"), weight 88.5 kg (195 lb), SpO2 97 %, not currently breastfeeding.  Physical Exam   Constitutional: She is oriented to person, place, and time. She appears well-developed and well-nourished. She is active and cooperative.  Non-toxic appearance. She has a sickly appearance. No distress.   HENT:   Head: Normocephalic and atraumatic.   Right Ear: Hearing, tympanic membrane, external ear and ear canal normal.   Left Ear: Hearing, tympanic membrane, external ear and ear canal normal.   Nose: Nose normal. Right sinus exhibits no maxillary sinus tenderness and no frontal sinus tenderness. Left sinus exhibits no maxillary sinus tenderness and no frontal sinus tenderness.   Mouth/Throat: Oropharynx is clear and moist. No oropharyngeal exudate.   Eyes: EOM are normal. Right eye exhibits no discharge. Left eye exhibits no discharge.   Neck: Normal range of motion. No tracheal deviation present. No thyromegaly present.   Cardiovascular: Normal rate, regular rhythm, normal heart sounds and intact distal pulses.   Pulmonary/Chest: Effort normal and breath sounds normal. No stridor. No respiratory distress. She has no wheezes.   Lymphadenopathy:     She has no cervical adenopathy.   Neurological: She is alert and oriented to person, place, and time.   Skin: Skin is warm and dry. She is not diaphoretic.   Psychiatric: She has a normal mood and affect. Her behavior is normal. Judgment and thought content normal.   Nursing note and vitals reviewed.    Imaging Results (last 24 hours)     Procedure Component Value Units Date/Time "    XR Chest PA & Lateral (In Office) [[id number]] Collected:  12/06/18 1615     Updated:  12/06/18 1619    Narrative:       XR CHEST PA AND LATERAL- 12/6/2018 4:10 PM CST     HISTORY: cough, fever; R05-Cough       COMPARISON: March 22, 2015.     FINDINGS:   Upright frontal and lateral radiographs of the chest were obtained.     The lungs are clear. The cardiomediastinal silhouette and pulmonary  vascularity are within normal limits. The osseous structures and  surrounding soft tissues demonstrate no acute abnormality.       Impression:       1. No radiographic evidence of acute cardiopulmonary process.        This report was finalized on 12/06/2018 16:15 by Dr. Zack Marcus MD.        Lab Results (last 24 hours)     Procedure Component Value Units Date/Time    POCT Influenza A/B [[id number]]  (Normal) Collected:  12/06/18 1624    Specimen:  Swab Updated:  12/06/18 1626     Rapid Influenza A Ag Negative     Rapid Influenza B Ag Negative     Internal Control Passed     Lot Number [id number]     Expiration Date 3,062,021            Assessment/Plan   Problems Addressed this Visit     None      Visit Diagnoses     Bronchitis    -  Primary    Cough        Relevant Orders    XR Chest PA & Lateral (In Office) (Completed)    Upper respiratory tract infection, unspecified type        Relevant Medications    cefdinir (OMNICEF) 300 MG capsule    Other Relevant Orders    POCT Influenza A/B (Completed)            Plan:    #1 bronchitis: New, worsening.  Chest x-ray in office today was negative for pneumonia.  Influenza test was negative.  Patient's vitals are normal.  However patient appears ill on exam.  Patient complains of worsening symptoms.  Recommended discontinue use of clindamycin that patient was given by urgent care.  Recommend treatment with Cefdinir.  Patient has already had steroid shot.  Advised that she can continue with conservative management.  Advised to rest and stay well-hydrated.  Advised on warning signs.   Return to office if not improving.          This document has been electronically signed by Lexus Melgar MD on December 6, 2018 7:03 PM

## 2019-04-04 DIAGNOSIS — D50.9 IRON DEFICIENCY ANEMIA, UNSPECIFIED IRON DEFICIENCY ANEMIA TYPE: Primary | ICD-10-CM

## 2019-04-05 ENCOUNTER — APPOINTMENT (OUTPATIENT)
Dept: LAB | Facility: HOSPITAL | Age: 49
End: 2019-04-05

## 2019-04-11 ENCOUNTER — LAB (OUTPATIENT)
Dept: LAB | Facility: HOSPITAL | Age: 49
End: 2019-04-11

## 2019-04-11 ENCOUNTER — OFFICE VISIT (OUTPATIENT)
Dept: ONCOLOGY | Facility: CLINIC | Age: 49
End: 2019-04-11

## 2019-04-11 VITALS
BODY MASS INDEX: 30.31 KG/M2 | TEMPERATURE: 98.8 F | HEART RATE: 64 BPM | OXYGEN SATURATION: 97 % | RESPIRATION RATE: 16 BRPM | WEIGHT: 200 LBS | SYSTOLIC BLOOD PRESSURE: 126 MMHG | DIASTOLIC BLOOD PRESSURE: 84 MMHG | HEIGHT: 68 IN

## 2019-04-11 DIAGNOSIS — D64.9 ANEMIA, UNSPECIFIED TYPE: Primary | ICD-10-CM

## 2019-04-11 DIAGNOSIS — D50.9 IRON DEFICIENCY ANEMIA, UNSPECIFIED IRON DEFICIENCY ANEMIA TYPE: Primary | ICD-10-CM

## 2019-04-11 DIAGNOSIS — D64.9 ANEMIA, UNSPECIFIED TYPE: ICD-10-CM

## 2019-04-11 LAB
ALBUMIN SERPL-MCNC: 4.2 G/DL (ref 3.5–5)
ALBUMIN/GLOB SERPL: 1.6 G/DL (ref 1.1–2.5)
ALP SERPL-CCNC: 53 U/L (ref 24–120)
ALT SERPL W P-5'-P-CCNC: <15 U/L (ref 0–54)
ANION GAP SERPL CALCULATED.3IONS-SCNC: 8 MMOL/L (ref 4–13)
AST SERPL-CCNC: 23 U/L (ref 7–45)
BASOPHILS # BLD AUTO: 0.03 10*3/MM3 (ref 0–0.2)
BASOPHILS NFR BLD AUTO: 0.6 % (ref 0–2)
BILIRUB SERPL-MCNC: 0.4 MG/DL (ref 0.1–1)
BUN BLD-MCNC: 16 MG/DL (ref 5–21)
BUN/CREAT SERPL: 22.5 (ref 7–25)
CALCIUM SPEC-SCNC: 9.8 MG/DL (ref 8.4–10.4)
CHLORIDE SERPL-SCNC: 104 MMOL/L (ref 98–110)
CO2 SERPL-SCNC: 27 MMOL/L (ref 24–31)
CREAT BLD-MCNC: 0.71 MG/DL (ref 0.5–1.4)
DEPRECATED RDW RBC AUTO: 42.9 FL (ref 40–54)
EOSINOPHIL # BLD AUTO: 0.07 10*3/MM3 (ref 0–0.7)
EOSINOPHIL NFR BLD AUTO: 1.4 % (ref 0–4)
ERYTHROCYTE [DISTWIDTH] IN BLOOD BY AUTOMATED COUNT: 13.1 % (ref 12–15)
FOLATE SERPL-MCNC: 7.01 NG/ML (ref 4.78–24.2)
GFR SERPL CREATININE-BSD FRML MDRD: 89 ML/MIN/1.73
GLOBULIN UR ELPH-MCNC: 2.6 GM/DL
GLUCOSE BLD-MCNC: 91 MG/DL (ref 70–100)
HCT VFR BLD AUTO: 34.7 % (ref 37–47)
HGB BLD-MCNC: 11.5 G/DL (ref 12–16)
HOLD SPECIMEN: NORMAL
IMM GRANULOCYTES # BLD AUTO: 0.02 10*3/MM3 (ref 0–0.05)
IMM GRANULOCYTES NFR BLD AUTO: 0.4 % (ref 0–5)
LYMPHOCYTES # BLD AUTO: 1.69 10*3/MM3 (ref 0.72–4.86)
LYMPHOCYTES NFR BLD AUTO: 34.9 % (ref 15–45)
MCH RBC QN AUTO: 29.9 PG (ref 28–32)
MCHC RBC AUTO-ENTMCNC: 33.1 G/DL (ref 33–36)
MCV RBC AUTO: 90.4 FL (ref 82–98)
MONOCYTES # BLD AUTO: 0.37 10*3/MM3 (ref 0.19–1.3)
MONOCYTES NFR BLD AUTO: 7.6 % (ref 4–12)
NEUTROPHILS # BLD AUTO: 2.66 10*3/MM3 (ref 1.87–8.4)
NEUTROPHILS NFR BLD AUTO: 55.1 % (ref 39–78)
NRBC BLD AUTO-RTO: 0 /100 WBC (ref 0–0)
PLATELET # BLD AUTO: 183 10*3/MM3 (ref 130–400)
PMV BLD AUTO: 9.3 FL (ref 6–12)
POTASSIUM BLD-SCNC: 4.2 MMOL/L (ref 3.5–5.3)
PROT SERPL-MCNC: 6.8 G/DL (ref 6.3–8.7)
RBC # BLD AUTO: 3.84 10*6/MM3 (ref 4.2–5.4)
SODIUM BLD-SCNC: 139 MMOL/L (ref 135–145)
VIT B12 BLD-MCNC: 253 PG/ML (ref 239–931)
WBC NRBC COR # BLD: 4.84 10*3/MM3 (ref 4.8–10.8)

## 2019-04-11 PROCEDURE — 99213 OFFICE O/P EST LOW 20 MIN: CPT | Performed by: INTERNAL MEDICINE

## 2019-04-11 PROCEDURE — 85025 COMPLETE CBC W/AUTO DIFF WBC: CPT

## 2019-04-11 PROCEDURE — 36415 COLL VENOUS BLD VENIPUNCTURE: CPT

## 2019-04-11 PROCEDURE — 80053 COMPREHEN METABOLIC PANEL: CPT

## 2019-04-11 PROCEDURE — 82746 ASSAY OF FOLIC ACID SERUM: CPT

## 2019-04-11 PROCEDURE — 82607 VITAMIN B-12: CPT

## 2019-04-11 NOTE — PROGRESS NOTES
[facility]  HEMATOLOGY & ONCOLOGY    Cancer Staging Information:  No matching staging information was found for the patient.      Subjective     VISIT DIAGNOSIS:   No diagnosis found.    REASON FOR VISIT:     Chief Complaint   Patient presents with   • Cervical Cancer     Here for check up, hasn't had any issues.        HEMATOLOGY / ONCOLOGY HISTORY:      Cervical cancer (CMS/HCC)     Initial Diagnosis     Cervical cancer         2013 Surgery     Patient Name: YESI BOWMAN  MR#: [id number]  : 1970  Gender: F   Ordering Physician: Ning Hernandez M.D.  Copy To:    Accession #: [id number]  Location:  0204-1  Encounter #: [id number]  Collected: 2013  Received: 2013  Reported: 2013    Clinical Diagnosis and History  Pre-operative diagnosis: Menorrhagia.        Final Diagnosis  Cervical biopsy:        Atypical papillary neoplastic structure, favor  adenocarcinoma (see microscopic description).     Electronically Signed Out By  Kit Worrell M.D.         2014 - 2014 Radiation     Radiation OncologyTreatment Course:  Yesi Bowman received 5040 cGy in 28 fractions to cervix and 3000 cGy via HDRs x 5.           2014 Procedure     Pap Smear:  OTHER INTERPRETATIONS:  Reactive changes associated with radiation.    FINAL CYTOLOGIC DIAGNOSIS:  Negative for Intraepithelial lesions or malignancy         2014 Imaging     CT Abd/Pelvis:  IMPRESSION-  1. No CT evidence of neoplastic disease.  2. 1.0 cm nodular pleural thickening, right lung base posteromedially  near the spine, stable compared to the  examination.  3. Changes from hysterectomy. Mild fluid or induration of the presacral  fat, likely related to previous therapy.  4. Mild circumferential bowel wall prominence of the sigmoid colon which  may be artifactual related to lack of distention. Hypertrophy related to  mild diverticulosis may also be considered. Minimal colitis changes  would be difficult  to totally exclude, however there are no secondary  signs of inflammation. Correlate with patient's symptomatology.  5. Right-sided sacralization of the L5 vertebral body         11/12/2014 Imaging     CT Abd/Pelvis:  IMPRESSION-   1. Diverticulosis. No acute intra-abdominal or pelvic abnormalities  identified.   2. Incidental note is made of sacralization of the right side of L5.    CT Chest:  IMPRESSION- Negative enhanced CT of the chest.             12/30/2014 Imaging     CT Abd/Pelvis:  IMPRESSION-  1. Changes from hysterectomy. Mild induration of the presacral and  perirectal fat with a small amount of fluid in the presacral space,  unchanged. Suspect posttherapy in nature.  2. Mildly prominent pelvic lymph node, left iliac chain. Metastatic  lymphadenopathy not excluded. PET CT characterization suggested if  clinically indicated.  3. No CT evidence of an acute intra-abdominal/pelvic pathological  process, otherwise.             12/31/2014 Imaging     PET/CT:  IMPRESSION-  1. Mild increased PET scan activity identified in the left pelvic lymph  node with a maximum intensity of 1.3 SUV, likely reactive, not typical  of neoplastic disease.  2. Hypermetabolic change in the cervix compatible with the patient's  history of cervical cancer.  3. No evidence of distal metastases         4/10/2015 Procedure     Pap Smear:  OTHER INTERPRETATIONS:  Reactive changes associated with radiation.  Inflammation.  Metaplasia.    FINAL CYTOLOGIC DIAGNOSIS:  Negative for Intraepithelial lesions or malignancy         11/11/2015 Imaging     CT Chest:  IMPRESSION-   1. Unremarkable CT of the chest.    CT Abd/Pelvis:      IMPRESSION-   1. No evidence of local recurrence or metastasis.   2. Soft tissue density overlying the RIGHT gluteus muscle. This is  likely from a recent injection. If no recent injection or trauma has  occurred, and ultrasound of this area could be obtained to assess for a  solid lesion.             2/15/2016  Procedure     Pap Smear:  OTHER INTERPRETATIONS:                           Reactive changes associated with radiation.                           Metaplasia.                                                      FINAL CYTOLOGIC DIAGNOSIS:                           Negative for Intraepithelial lesions or malignancy         4/12/2016 Imaging     CT Abd/Pelvis:  IMPRESSION-   Prior hysterectomy. Thickening of the wall of the sigmoid and rectum. No  other abnormalities are identified.             9/2/2016 Imaging     CT Abd/Pelvis:  Summary-  1. Acute appendicitis.         11/7/2016 Procedure     Pap Smear:  Interpretation   Negative for intraepithelial lesion or malignancy              3/3/2017 Procedure     Pap Smear:  Interpretation   Negative for intraepithelial lesion or malignancy              9/20/2017 Procedure     Pap Smear:  Interpretation   Negative for intraepithelial lesion or malignancy              2/15/2018 Procedure     Pap Smear:  Interpretation   Negative for intraepithelial lesion or malignancy                     INTERVAL HISTORY  Patient ID: Yesi Bowman is a 48 y.o. year old female stage II cervical ca. Chronic dyspareunia due to stenosis, no dysuria, hematuria, overt bleeding or mass noted. She has not been to a gynecologist in a long time.  4/11/19: reports occasional bleeding per anus. She has a blister perianal that disappeared on it own. denies weight loss, night sweats, fever, MICHEAL, n/v, abdominal pain.  -rest of ros unremarkable.       Past Medical History:   Past Medical History:   Diagnosis Date   • Alcoholism (CMS/HCC)    • Anemia    • Cervical cancer (CMS/HCC)    • Depression    • Drug therapy    • History of radiation therapy     5040 cGy to cervix + 3000 cGy (HDRs x 5)   • History of transfusion    • Hx of colonic polyps    • Hx of radiation therapy    • Substance abuse (CMS/HCC)      Past Surgical History:   Past Surgical History:   Procedure Laterality Date   • APPENDECTOMY     •  CERVICAL BIOPSY     •  SECTION     • CHOLECYSTECTOMY WITH INTRAOPERATIVE CHOLANGIOGRAM N/A 3/9/2018    Procedure: CHOLECYSTECTOMY LAPAROSCOPIC ;  Surgeon: Karla Lee MD;  Location: [facility];  Service:    • COLONOSCOPY  2016    Hyperplastic polyp at 20 cm repeat exam in 5 years   • CYSTOSCOPY      Diagnostic   • DILATATION AND CURETTAGE     • HYSTERECTOMY     • KNEE SURGERY     • OOPHORECTOMY     • PELVIC EXAMINATION UNDER ANESTHESIA     • TONSILLECTOMY     • TOTAL LAPAROSCOPIC HYSTERECTOMY WITH DAVINCI ROBOT       Social History:   Social History     Socioeconomic History   • Marital status:      Spouse name: Not on file   • Number of children: Not on file   • Years of education: Not on file   • Highest education level: Not on file   Tobacco Use   • Smoking status: Former Smoker     Packs/day: 0.50     Types: Cigarettes     Last attempt to quit: 2016     Years since quittin.2   • Smokeless tobacco: Never Used   Substance and Sexual Activity   • Alcohol use: No   • Drug use: No   • Sexual activity: No     Family History:   Family History   Problem Relation Age of Onset   • Skin cancer Mother    • Cancer Maternal Grandmother         Malignancy   • Cancer Paternal Grandmother         Malignancy   • Colon cancer Neg Hx    • Colon polyps Neg Hx        Review of Systems   Constitutional: Negative.    HENT: Negative.    Eyes: Negative.    Respiratory: Negative.    Cardiovascular: Negative.    Gastrointestinal: Negative.    Endocrine: Negative.    Genitourinary: Negative.    Musculoskeletal: Negative.    Skin: Negative.    Allergic/Immunologic: Negative.    Neurological: Negative.    Hematological: Negative.    Psychiatric/Behavioral: Negative.         Performance Status:  Asymptomatic    Medications:    Current Outpatient Medications   Medication Sig Dispense Refill   • cholecalciferol (VITAMIN D3) 1000 units tablet Take 1,000 Units by mouth Daily.     • NON FORMULARY Take 1 tablet by  "mouth Daily. Vitamin B 6     • Omega-3 Fatty Acids (FISH OIL) 1000 MG capsule capsule Take 1,000 mg by mouth Daily With Breakfast.     • sertraline (ZOLOFT) 100 MG tablet Take 100 mg by mouth Daily.     • traZODone (DESYREL) 150 MG tablet Take 50 mg by mouth Every Night.       No current facility-administered medications for this visit.        ALLERGIES:  No Known Allergies    Objective      Vitals:    04/11/19 1503   BP: 126/84   Pulse: 64   Resp: 16   Temp: 98.8 °F (37.1 °C)   TempSrc: Oral   SpO2: 97%   Weight: 90.7 kg (200 lb)   Height: 172.7 cm (68\")   PainSc: 0-No pain         Current Status 4/11/2019   ECOG score 0         Physical Examremains the same  General Appearance: Patient is awake, alert, oriented and in no acute distress. Patient is welldeveloped, wellnourished, and appears stated age.  HEENT: Normocephalic. Sclerae clear, conjunctiva pink, extraocular movements intact, pupils, round, reactive to light and  accommodation. Mouth and throat are clear with moist oral mucosa.  NECK: Supple, no jugular venous distention, thyroid not enlarged.  LYMPH: No cervical, supraclavicular, axillary, or inguinal lymphadenopathy.  CHEST: Equal bilateral expansion, AP  diameter normal, resonant percussion note  LUNGS: Good air movement, no rales, rhonchi, rubs or wheezes with auscultation  CARDIO: Regular sinus rhythm, no murmurs, gallops or rubs.  ABDOMEN: Nondistended, soft, No tenderness, no guarding, no rebound, No hepatosplenomegaly. No abdominal masses. Bowel sounds positive. No hernia  GENITALIA: Not examined.  BREASTS: Not examined.  MUSKEL: No joint swelling, decreased motion, or inflammation  EXTREMS: No edema, clubbing, cyanosis, No varicose veins.  NEURO: Grossly nonfocal, Gait is coordinated and smooth, Cognition is preserved.  SKIN: No rashes, no ecchymoses, no petechia.  PSYCH: Oriented to time, place and person. Memory is preserved. Mood and affect appear normal    RECENT LABS:  Lab on 04/11/2019 "   Component Date Value Ref Range Status   • WBC 04/11/2019 4.84  4.80 - 10.80 10*3/mm3 Final   • RBC 04/11/2019 3.84* 4.20 - 5.40 10*6/mm3 Final   • Hemoglobin 04/11/2019 11.5* 12.0 - 16.0 g/dL Final   • Hematocrit 04/11/2019 34.7* 37.0 - 47.0 % Final   • MCV 04/11/2019 90.4  82.0 - 98.0 fL Final   • MCH 04/11/2019 29.9  28.0 - 32.0 pg Final   • MCHC 04/11/2019 33.1  33.0 - 36.0 g/dL Final   • RDW 04/11/2019 13.1  12.0 - 15.0 % Final   • RDW-SD 04/11/2019 42.9  40.0 - 54.0 fl Final   • MPV 04/11/2019 9.3  6.0 - 12.0 fL Final   • Platelets 04/11/2019 183  130 - 400 10*3/mm3 Final   • Neutrophil % 04/11/2019 55.1  39.0 - 78.0 % Final   • Lymphocyte % 04/11/2019 34.9  15.0 - 45.0 % Final   • Monocyte % 04/11/2019 7.6  4.0 - 12.0 % Final   • Eosinophil % 04/11/2019 1.4  0.0 - 4.0 % Final   • Basophil % 04/11/2019 0.6  0.0 - 2.0 % Final   • Immature Grans % 04/11/2019 0.4  0.0 - 5.0 % Final   • Neutrophils, Absolute 04/11/2019 2.66  1.87 - 8.40 10*3/mm3 Final   • Lymphocytes, Absolute 04/11/2019 1.69  0.72 - 4.86 10*3/mm3 Final   • Monocytes, Absolute 04/11/2019 0.37  0.19 - 1.30 10*3/mm3 Final   • Eosinophils, Absolute 04/11/2019 0.07  0.00 - 0.70 10*3/mm3 Final   • Basophils, Absolute 04/11/2019 0.03  0.00 - 0.20 10*3/mm3 Final   • Immature Grans, Absolute 04/11/2019 0.02  0.00 - 0.05 10*3/mm3 Final   • nRBC 04/11/2019 0.0  0.0 - 0.0 /100 WBC Final       RADIOLOGY:  No results found.         Assessment/Plan  Yesi Bowman is a 48 y.o. year old female stage IIb well differentiated cervical adenocarcinoma who is status post radical robotic hysterectomy and bilateral pelvic lymphadenectomy followed by adjuvant radiation therapy to the pelvis + cisplatin chemo as a radiosensitizer. She is currently DM.   1. Advised yearly gyne exam.--reiterated the importance of gyne f/u.she sees Dr Barrios and he does cervical exam.  -rtc in 6 months.    2. Depression: on zoloft  3. Chronic pain: On trazodone  4. ADHD: on atomoxitine  5.  Anemia:Hg of 11.5. Check iron profile, ferritin, and act accordingly.  -she has c-scope a long time ago. Unremarkable per pt.    Patient Active Problem List   Diagnosis   • Cervical cancer (CMS/HCC)   • History of radiation therapy   • Former smoker   • Encounter for follow-up surveillance of cervical cancer   • Generalized bloating   • Pap smear of vagina with previous hysterectomy for cancer   • Hx of cervical cancer   • Diarrhea   • Bloating   • Generalized abdominal pain   • Nausea and vomiting   • Obesity, unspecified obesity severity, unspecified obesity type   • Nonsmoker   • Papanicolaou smear   • Ear fullness, bilateral            Obiageli Kole Ramey MD    4/11/2019    3:07 PM

## 2019-10-07 DIAGNOSIS — D64.9 ANEMIA, UNSPECIFIED TYPE: Primary | ICD-10-CM

## 2019-10-10 ENCOUNTER — APPOINTMENT (OUTPATIENT)
Dept: LAB | Facility: HOSPITAL | Age: 49
End: 2019-10-10

## 2019-10-10 ENCOUNTER — OFFICE VISIT (OUTPATIENT)
Dept: ONCOLOGY | Facility: CLINIC | Age: 49
End: 2019-10-10

## 2019-10-10 VITALS
HEART RATE: 72 BPM | SYSTOLIC BLOOD PRESSURE: 116 MMHG | OXYGEN SATURATION: 98 % | WEIGHT: 202.1 LBS | DIASTOLIC BLOOD PRESSURE: 76 MMHG | BODY MASS INDEX: 30.63 KG/M2 | TEMPERATURE: 97.6 F | RESPIRATION RATE: 16 BRPM | HEIGHT: 68 IN

## 2019-10-10 DIAGNOSIS — C53.9 MALIGNANT NEOPLASM OF CERVIX, UNSPECIFIED SITE (HCC): Primary | ICD-10-CM

## 2019-10-10 DIAGNOSIS — D64.9 ANEMIA, UNSPECIFIED TYPE: Primary | ICD-10-CM

## 2019-10-10 DIAGNOSIS — M53.3 SACRAL BACK PAIN: ICD-10-CM

## 2019-10-10 LAB
ALBUMIN SERPL-MCNC: 4.1 G/DL (ref 3.5–5.2)
ALBUMIN/GLOB SERPL: 1.5 G/DL
ALP SERPL-CCNC: 54 U/L (ref 39–117)
ALT SERPL W P-5'-P-CCNC: 9 U/L (ref 1–33)
ANION GAP SERPL CALCULATED.3IONS-SCNC: 11 MMOL/L (ref 5–15)
AST SERPL-CCNC: 13 U/L (ref 1–32)
BASOPHILS # BLD AUTO: 0.03 10*3/MM3 (ref 0–0.2)
BASOPHILS NFR BLD AUTO: 0.6 % (ref 0–1.5)
BILIRUB SERPL-MCNC: 0.2 MG/DL (ref 0.2–1.2)
BUN BLD-MCNC: 14 MG/DL (ref 6–20)
BUN/CREAT SERPL: 23.3 (ref 7–25)
CALCIUM SPEC-SCNC: 9.4 MG/DL (ref 8.6–10.5)
CHLORIDE SERPL-SCNC: 103 MMOL/L (ref 98–107)
CO2 SERPL-SCNC: 28 MMOL/L (ref 22–29)
CREAT BLD-MCNC: 0.6 MG/DL (ref 0.57–1)
DEPRECATED RDW RBC AUTO: 41.6 FL (ref 37–54)
EOSINOPHIL # BLD AUTO: 0.14 10*3/MM3 (ref 0–0.4)
EOSINOPHIL NFR BLD AUTO: 2.7 % (ref 0.3–6.2)
ERYTHROCYTE [DISTWIDTH] IN BLOOD BY AUTOMATED COUNT: 12.5 % (ref 12.3–15.4)
GFR SERPL CREATININE-BSD FRML MDRD: 108 ML/MIN/1.73
GLOBULIN UR ELPH-MCNC: 2.8 GM/DL
GLUCOSE BLD-MCNC: 78 MG/DL (ref 65–99)
HCT VFR BLD AUTO: 37.8 % (ref 34–46.6)
HGB BLD-MCNC: 12.4 G/DL (ref 12–15.9)
HOLD SPECIMEN: NORMAL
IMM GRANULOCYTES # BLD AUTO: 0.01 10*3/MM3 (ref 0–0.05)
IMM GRANULOCYTES NFR BLD AUTO: 0.2 % (ref 0–0.5)
LYMPHOCYTES # BLD AUTO: 1.62 10*3/MM3 (ref 0.7–3.1)
LYMPHOCYTES NFR BLD AUTO: 31.3 % (ref 19.6–45.3)
MCH RBC QN AUTO: 29.8 PG (ref 26.6–33)
MCHC RBC AUTO-ENTMCNC: 32.8 G/DL (ref 31.5–35.7)
MCV RBC AUTO: 90.9 FL (ref 79–97)
MONOCYTES # BLD AUTO: 0.35 10*3/MM3 (ref 0.1–0.9)
MONOCYTES NFR BLD AUTO: 6.8 % (ref 5–12)
NEUTROPHILS # BLD AUTO: 3.02 10*3/MM3 (ref 1.7–7)
NEUTROPHILS NFR BLD AUTO: 58.4 % (ref 42.7–76)
NRBC BLD AUTO-RTO: 0 /100 WBC (ref 0–0.2)
PLATELET # BLD AUTO: 214 10*3/MM3 (ref 140–450)
PMV BLD AUTO: 9.5 FL (ref 6–12)
POTASSIUM BLD-SCNC: 3.8 MMOL/L (ref 3.5–5.2)
PROT SERPL-MCNC: 6.9 G/DL (ref 6–8.5)
RBC # BLD AUTO: 4.16 10*6/MM3 (ref 3.77–5.28)
SODIUM BLD-SCNC: 142 MMOL/L (ref 136–145)
WBC NRBC COR # BLD: 5.17 10*3/MM3 (ref 3.4–10.8)

## 2019-10-10 PROCEDURE — 85025 COMPLETE CBC W/AUTO DIFF WBC: CPT | Performed by: INTERNAL MEDICINE

## 2019-10-10 PROCEDURE — 36415 COLL VENOUS BLD VENIPUNCTURE: CPT | Performed by: INTERNAL MEDICINE

## 2019-10-10 PROCEDURE — 80053 COMPREHEN METABOLIC PANEL: CPT | Performed by: INTERNAL MEDICINE

## 2019-10-10 PROCEDURE — 99214 OFFICE O/P EST MOD 30 MIN: CPT | Performed by: INTERNAL MEDICINE

## 2019-10-10 NOTE — PROGRESS NOTES
[facility]  HEMATOLOGY & ONCOLOGY    Cancer Staging Information:  No matching staging information was found for the patient.      Subjective     VISIT DIAGNOSIS:   No diagnosis found.    REASON FOR VISIT:     Chief Complaint   Patient presents with   • Anemia     Here for followup   • Cervx Neoplasm   • Pain     Kidney        HEMATOLOGY / ONCOLOGY HISTORY:      Cervical cancer (CMS/HCC)     Initial Diagnosis     Cervical cancer         2013 Surgery     Patient Name: YESI BOWMAN  MR#: [id number]  : 1970  Gender: F   Ordering Physician: Ning Hernandez M.D.  Copy To:    Accession #: [id number]  Location:  0204  Encounter #: [id number]  Collected: 2013  Received: 2013  Reported: 2013    Clinical Diagnosis and History  Pre-operative diagnosis: Menorrhagia.        Final Diagnosis  Cervical biopsy:        Atypical papillary neoplastic structure, favor  adenocarcinoma (see microscopic description).     Electronically Signed Out By  Kit Worrell M.D.         2014 - 2014 Radiation     Radiation OncologyTreatment Course:  Yesi Bowman received 5040 cGy in 28 fractions to cervix and 3000 cGy via HDRs x 5.           2014 Procedure     Pap Smear:  OTHER INTERPRETATIONS:  Reactive changes associated with radiation.    FINAL CYTOLOGIC DIAGNOSIS:  Negative for Intraepithelial lesions or malignancy         2014 Imaging     CT Abd/Pelvis:  IMPRESSION-  1. No CT evidence of neoplastic disease.  2. 1.0 cm nodular pleural thickening, right lung base posteromedially  near the spine, stable compared to the 2013 examination.  3. Changes from hysterectomy. Mild fluid or induration of the presacral  fat, likely related to previous therapy.  4. Mild circumferential bowel wall prominence of the sigmoid colon which  may be artifactual related to lack of distention. Hypertrophy related to  mild diverticulosis may also be considered. Minimal colitis changes  would be  difficult to totally exclude, however there are no secondary  signs of inflammation. Correlate with patient's symptomatology.  5. Right-sided sacralization of the L5 vertebral body         11/12/2014 Imaging     CT Abd/Pelvis:  IMPRESSION-   1. Diverticulosis. No acute intra-abdominal or pelvic abnormalities  identified.   2. Incidental note is made of sacralization of the right side of L5.    CT Chest:  IMPRESSION- Negative enhanced CT of the chest.             12/30/2014 Imaging     CT Abd/Pelvis:  IMPRESSION-  1. Changes from hysterectomy. Mild induration of the presacral and  perirectal fat with a small amount of fluid in the presacral space,  unchanged. Suspect posttherapy in nature.  2. Mildly prominent pelvic lymph node, left iliac chain. Metastatic  lymphadenopathy not excluded. PET CT characterization suggested if  clinically indicated.  3. No CT evidence of an acute intra-abdominal/pelvic pathological  process, otherwise.             12/31/2014 Imaging     PET/CT:  IMPRESSION-  1. Mild increased PET scan activity identified in the left pelvic lymph  node with a maximum intensity of 1.3 SUV, likely reactive, not typical  of neoplastic disease.  2. Hypermetabolic change in the cervix compatible with the patient's  history of cervical cancer.  3. No evidence of distal metastases         4/10/2015 Procedure     Pap Smear:  OTHER INTERPRETATIONS:  Reactive changes associated with radiation.  Inflammation.  Metaplasia.    FINAL CYTOLOGIC DIAGNOSIS:  Negative for Intraepithelial lesions or malignancy         11/11/2015 Imaging     CT Chest:  IMPRESSION-   1. Unremarkable CT of the chest.    CT Abd/Pelvis:      IMPRESSION-   1. No evidence of local recurrence or metastasis.   2. Soft tissue density overlying the RIGHT gluteus muscle. This is  likely from a recent injection. If no recent injection or trauma has  occurred, and ultrasound of this area could be obtained to assess for a  solid lesion.              2/15/2016 Procedure     Pap Smear:  OTHER INTERPRETATIONS:                           Reactive changes associated with radiation.                           Metaplasia.                                                      FINAL CYTOLOGIC DIAGNOSIS:                           Negative for Intraepithelial lesions or malignancy         4/12/2016 Imaging     CT Abd/Pelvis:  IMPRESSION-   Prior hysterectomy. Thickening of the wall of the sigmoid and rectum. No  other abnormalities are identified.             9/2/2016 Imaging     CT Abd/Pelvis:  Summary-  1. Acute appendicitis.         11/7/2016 Procedure     Pap Smear:  Interpretation   Negative for intraepithelial lesion or malignancy              3/3/2017 Procedure     Pap Smear:  Interpretation   Negative for intraepithelial lesion or malignancy              9/20/2017 Procedure     Pap Smear:  Interpretation   Negative for intraepithelial lesion or malignancy              2/15/2018 Procedure     Pap Smear:  Interpretation   Negative for intraepithelial lesion or malignancy                     INTERVAL HISTORY  Patient ID: Yesi Bowman is a 48 y.o. year old female stage II cervical ca. Chronic dyspareunia due to stenosis, no dysuria, hematuria, overt bleeding or mass noted. She has not been to a gynecologist in a long time.  4/11/19: reports occasional bleeding per anus. She has a blister perianal that disappeared on it own.    10/10/19: having lower back pain for a while now. No ppt evet. Painful to walk. She had a difficult time getting on the exam table. Tender to palpation of midback to left buttock area.  - denies weight loss, night sweats, fever, MICHEAL, n/v, abdominal pain.  -rest of ros unremarkable.       Past Medical History:   Past Medical History:   Diagnosis Date   • Alcoholism (CMS/HCC)    • Anemia    • Cervical cancer (CMS/HCC)    • Depression    • Drug therapy    • History of radiation therapy     5040 cGy to cervix + 3000 cGy (HDRs x 5)   • History of  transfusion    • Hx of colonic polyps    • Hx of radiation therapy    • Substance abuse (CMS/HCC)      Past Surgical History:   Past Surgical History:   Procedure Laterality Date   • APPENDECTOMY     • CERVICAL BIOPSY     •  SECTION     • CHOLECYSTECTOMY WITH INTRAOPERATIVE CHOLANGIOGRAM N/A 3/9/2018    Procedure: CHOLECYSTECTOMY LAPAROSCOPIC ;  Surgeon: Karla Lee MD;  Location: [facility];  Service:    • COLONOSCOPY  2016    Hyperplastic polyp at 20 cm repeat exam in 5 years   • CYSTOSCOPY      Diagnostic   • DILATATION AND CURETTAGE     • HYSTERECTOMY     • KNEE SURGERY     • OOPHORECTOMY     • PELVIC EXAMINATION UNDER ANESTHESIA     • TONSILLECTOMY     • TOTAL LAPAROSCOPIC HYSTERECTOMY WITH DAVINCI ROBOT       Social History:   Social History     Socioeconomic History   • Marital status:      Spouse name: Not on file   • Number of children: Not on file   • Years of education: Not on file   • Highest education level: Not on file   Tobacco Use   • Smoking status: Former Smoker     Packs/day: 0.50     Types: Cigarettes     Last attempt to quit: 2016     Years since quittin.7   • Smokeless tobacco: Never Used   Substance and Sexual Activity   • Alcohol use: No   • Drug use: No   • Sexual activity: No     Family History:   Family History   Problem Relation Age of Onset   • Skin cancer Mother    • Cancer Maternal Grandmother         Malignancy   • Cancer Paternal Grandmother         Malignancy   • Colon cancer Neg Hx    • Colon polyps Neg Hx        Review of Systems   Constitutional: Negative.    HENT: Negative.    Eyes: Negative.    Respiratory: Negative.    Cardiovascular: Negative.    Gastrointestinal: Negative.    Endocrine: Negative.    Genitourinary: Negative.    Musculoskeletal: Positive for back pain.   Skin: Negative.    Allergic/Immunologic: Negative.    Neurological: Negative.    Hematological: Negative.    Psychiatric/Behavioral: Negative.         Performance  "Status:  Asymptomatic    Medications:    Current Outpatient Medications   Medication Sig Dispense Refill   • cholecalciferol (VITAMIN D3) 1000 units tablet Take 1,000 Units by mouth Daily.     • Omega-3 Fatty Acids (FISH OIL) 1000 MG capsule capsule Take 1,000 mg by mouth Daily With Breakfast.     • sertraline (ZOLOFT) 100 MG tablet Take 100 mg by mouth Daily.     • traZODone (DESYREL) 150 MG tablet Take 50 mg by mouth Every Night.       No current facility-administered medications for this visit.        ALLERGIES:  No Known Allergies    Objective      Vitals:    10/10/19 1442   BP: 116/76   Pulse: 72   Resp: 16   Temp: 97.6 °F (36.4 °C)   TempSrc: Temporal   SpO2: 98%   Weight: 91.7 kg (202 lb 1.6 oz)   Height: 172.7 cm (68\")   PainSc:   4   PainLoc: Back         Current Status 10/10/2019   ECOG score 0         Physical Examremains the same  General Appearance: Patient is awake, alert, oriented and in no acute distress. Patient is welldeveloped, wellnourished, and appears stated age.  HEENT: Normocephalic. Sclerae clear, conjunctiva pink, extraocular movements intact, pupils, round, reactive to light and  accommodation. Mouth and throat are clear with moist oral mucosa.  NECK: Supple, no jugular venous distention, thyroid not enlarged.  LYMPH: No cervical, supraclavicular, axillary, or inguinal lymphadenopathy.  CHEST: Equal bilateral expansion, AP  diameter normal, resonant percussion note  LUNGS: Good air movement, no rales, rhonchi, rubs or wheezes with auscultation  CARDIO: Regular sinus rhythm, no murmurs, gallops or rubs.  ABDOMEN: Nondistended, soft, No tenderness, no guarding, no rebound, No hepatosplenomegaly. No abdominal masses. Bowel sounds positive. No hernia  GENITALIA: Not examined.  BREASTS: Not examined.  MUSKEL: No joint swelling, decreased motion, or inflammation  EXTREMS: No edema, clubbing, cyanosis, No varicose veins.  to palpation.  NEURO: Grossly nonfocal, Gait is coordinated and " smooth, Cognition is preserved.  SKIN: No rashes, no ecchymoses, no petechia.  PSYCH: Oriented to time, place and person. Memory is preserved. Mood and affect appear normal    RECENT LABS:  Orders Only on 10/07/2019   Component Date Value Ref Range Status   • WBC 10/10/2019 5.17  3.40 - 10.80 10*3/mm3 Final   • RBC 10/10/2019 4.16  3.77 - 5.28 10*6/mm3 Final   • Hemoglobin 10/10/2019 12.4  12.0 - 15.9 g/dL Final   • Hematocrit 10/10/2019 37.8  34.0 - 46.6 % Final   • MCV 10/10/2019 90.9  79.0 - 97.0 fL Final   • MCH 10/10/2019 29.8  26.6 - 33.0 pg Final   • MCHC 10/10/2019 32.8  31.5 - 35.7 g/dL Final   • RDW 10/10/2019 12.5  12.3 - 15.4 % Final   • RDW-SD 10/10/2019 41.6  37.0 - 54.0 fl Final   • MPV 10/10/2019 9.5  6.0 - 12.0 fL Final   • Platelets 10/10/2019 214  140 - 450 10*3/mm3 Final   • Neutrophil % 10/10/2019 58.4  42.7 - 76.0 % Final   • Lymphocyte % 10/10/2019 31.3  19.6 - 45.3 % Final   • Monocyte % 10/10/2019 6.8  5.0 - 12.0 % Final   • Eosinophil % 10/10/2019 2.7  0.3 - 6.2 % Final   • Basophil % 10/10/2019 0.6  0.0 - 1.5 % Final   • Immature Grans % 10/10/2019 0.2  0.0 - 0.5 % Final   • Neutrophils, Absolute 10/10/2019 3.02  1.70 - 7.00 10*3/mm3 Final   • Lymphocytes, Absolute 10/10/2019 1.62  0.70 - 3.10 10*3/mm3 Final   • Monocytes, Absolute 10/10/2019 0.35  0.10 - 0.90 10*3/mm3 Final   • Eosinophils, Absolute 10/10/2019 0.14  0.00 - 0.40 10*3/mm3 Final   • Basophils, Absolute 10/10/2019 0.03  0.00 - 0.20 10*3/mm3 Final   • Immature Grans, Absolute 10/10/2019 0.01  0.00 - 0.05 10*3/mm3 Final   • nRBC 10/10/2019 0.0  0.0 - 0.2 /100 WBC Final       RADIOLOGY:  No results found.         Assessment/Plan  Yesi Bowman is a 48 y.o. year old female stage IIb well differentiated cervical adenocarcinoma who is status post radical robotic hysterectomy and bilateral pelvic lymphadenectomy followed by adjuvant radiation therapy to the pelvis + cisplatin chemo as a radiosensitizer. She is currently DM.   1.  Advised yearly gyne exam.--reiterated the importance of gyne f/u.she sees Dr Barrios and he does cervical exam.  --labs reviewed with pt cr 0.6, lft normal, alk phos nl, wbc 5.17, Hg 12.4, plt 214.  -lower back and pelvic pain >2 weeks. No aggrevating event.  -will evaluate with lumbar/pelvic MRI  -rtc after imaging to review    2. Depression: on zoloft  3. Chronic pain: On trazodone  4. ADHD: on atomoxitine    Patient Active Problem List   Diagnosis   • Cervical cancer (CMS/HCC)   • History of radiation therapy   • Former smoker   • Encounter for follow-up surveillance of cervical cancer   • Generalized bloating   • Pap smear of vagina with previous hysterectomy for cancer   • Hx of cervical cancer   • Diarrhea   • Bloating   • Generalized abdominal pain   • Nausea and vomiting   • Obesity, unspecified obesity severity, unspecified obesity type   • Nonsmoker   • Papanicolaou smear   • Ear fullness, bilateral            Obiageli Kole Ramey MD    10/10/2019    2:56 PM

## 2019-11-06 ENCOUNTER — OFFICE VISIT (OUTPATIENT)
Dept: FAMILY MEDICINE CLINIC | Facility: CLINIC | Age: 49
End: 2019-11-06

## 2019-11-06 VITALS
BODY MASS INDEX: 30.62 KG/M2 | SYSTOLIC BLOOD PRESSURE: 135 MMHG | WEIGHT: 202 LBS | HEART RATE: 81 BPM | DIASTOLIC BLOOD PRESSURE: 87 MMHG | TEMPERATURE: 98.3 F | RESPIRATION RATE: 18 BRPM | OXYGEN SATURATION: 97 % | HEIGHT: 68 IN

## 2019-11-06 DIAGNOSIS — R11.2 NAUSEA AND VOMITING, INTRACTABILITY OF VOMITING NOT SPECIFIED, UNSPECIFIED VOMITING TYPE: ICD-10-CM

## 2019-11-06 DIAGNOSIS — T78.05XA ANAPHYLACTIC REACTION DUE TO TREE NUTS AND SEEDS, INITIAL ENCOUNTER: Primary | ICD-10-CM

## 2019-11-06 DIAGNOSIS — K21.9 GASTROESOPHAGEAL REFLUX DISEASE WITHOUT ESOPHAGITIS: ICD-10-CM

## 2019-11-06 PROCEDURE — 99214 OFFICE O/P EST MOD 30 MIN: CPT | Performed by: NURSE PRACTITIONER

## 2019-11-06 RX ORDER — ONDANSETRON 4 MG/1
4 TABLET, ORALLY DISINTEGRATING ORAL EVERY 8 HOURS PRN
Qty: 20 TABLET | Refills: 0 | Status: SHIPPED | OUTPATIENT
Start: 2019-11-06

## 2019-11-06 RX ORDER — OMEPRAZOLE 40 MG/1
40 CAPSULE, DELAYED RELEASE ORAL DAILY
Qty: 30 CAPSULE | Refills: 1 | Status: SHIPPED | OUTPATIENT
Start: 2019-11-06 | End: 2019-12-30

## 2019-11-06 NOTE — PROGRESS NOTES
Chief Complaint   Patient presents with   • Vomiting     Vomiting and chills .   Thinks she has a food allergies.    Thinks she has narrowed down to nuts.          Yesi Lynn A 49 y.o. female here for complaints of severe nausea and diarrhea, burping eggs, (says that she had her gallbladder out last Feb, and this is the exact same symptoms she had before that) she has been running a low grade fever.   Says this has happened every time she has eaten.  She says she feels horrible.  Says she can eat almonds and pistachios with out problems       Chronic problems: Vit D def stable with cholecalciferol, depression stable with sertraline, insomnia stable with trazodone, and hyperlipidemia stable with omega 3 fatty acids. Had cervical cancer  and took chemo/radiation and has been in remission and obesity      PCP:  Dorita Mazariegos APRN    No Known Allergies    Past Medical History:   Diagnosis Date   • Alcoholism (CMS/HCC)    • Anemia    • Cervical cancer (CMS/HCC)    • Depression    • Drug therapy    • History of radiation therapy     5040 cGy to cervix + 3000 cGy (HDRs x 5)   • History of transfusion    • Hx of colonic polyps    • Hx of radiation therapy    • Substance abuse (CMS/HCC)        Past Surgical History:   Procedure Laterality Date   • APPENDECTOMY     • CERVICAL BIOPSY     •  SECTION     • CHOLECYSTECTOMY WITH INTRAOPERATIVE CHOLANGIOGRAM N/A 3/9/2018    Procedure: CHOLECYSTECTOMY LAPAROSCOPIC ;  Surgeon: Karla eLe MD;  Location: [facility];  Service:    • COLONOSCOPY  2016    Hyperplastic polyp at 20 cm repeat exam in 5 years   • CYSTOSCOPY      Diagnostic   • DILATATION AND CURETTAGE     • HYSTERECTOMY     • KNEE SURGERY     • OOPHORECTOMY     • PELVIC EXAMINATION UNDER ANESTHESIA     • TONSILLECTOMY     • TOTAL LAPAROSCOPIC HYSTERECTOMY WITH DAVINCI ROBOT         Social History     Socioeconomic History   • Marital status:      Spouse name: Not on file   • Number  "of children: Not on file   • Years of education: Not on file   • Highest education level: Not on file   Tobacco Use   • Smoking status: Former Smoker     Packs/day: 0.50     Types: Cigarettes     Last attempt to quit: 2016     Years since quittin.8   • Smokeless tobacco: Never Used   Substance and Sexual Activity   • Alcohol use: No   • Drug use: No   • Sexual activity: No       Family History   Problem Relation Age of Onset   • Skin cancer Mother    • Cancer Maternal Grandmother         Malignancy   • Cancer Paternal Grandmother         Malignancy   • Colon cancer Neg Hx    • Colon polyps Neg Hx        Current Outpatient Medications on File Prior to Visit   Medication Sig Dispense Refill   • cholecalciferol (VITAMIN D3) 1000 units tablet Take 1,000 Units by mouth Daily.     • Omega-3 Fatty Acids (FISH OIL) 1000 MG capsule capsule Take 1,000 mg by mouth Daily With Breakfast.     • sertraline (ZOLOFT) 100 MG tablet Take 100 mg by mouth Daily.     • traZODone (DESYREL) 150 MG tablet Take 50 mg by mouth Every Night.       No current facility-administered medications on file prior to visit.         REVIEW OF SYMPTOMS: (Positives bolded)  General:  weight loss, fever, chills, night sweats, fatigue, appetite loss  HEENT:  blurry vision, eye pain, eye discharge, dry eyes, decreased vision  Respiratory: shortness of breath, cough, hemoptysis, wheezing, pleurisy,   Cardiovascular:  chest pain, PND, palpitation, edema, orthopnea, syncope, swelling of extremities  Gastro: Nausea, vomiting, diarrhea, hematemesis, abdominal pain, constipation  Genito: hematuria, dysuria, glycosuria, hesitancy, frequency, incontinence  Musckelo: Arthralgia, myalgia, muscle weakness, joint swelling, NSAID use  Skin: rash, pruritis, sores, nail changes, skin thickening, change in wart/mole, itching, rash, new lesions, pruritus, nail changes  Neuro:  Migraine, numbness, ataxia, tremor, vertigo, weakness, memory loss  \"All other systems " reviewed and negative, except as listed above.”      OBJECTIVE:  Constitutional:  -No acute distress, Consistent with stated age.  Oriented x 3, alert Posture-Not doubled over. Normal pace, normal arm movement. Patient is pleasant and cooperative with the interview and exam.    Integumentary: No rashes, ulcers or lesions. No edema.  Capillary refill is normal bilateral Upper and lower extremity.     Eye: Bilaterally PERRLA, EOMI.  No discharge.  Upper and lower eyelids are normal. Sclera/conjunctiva normal without discharge. Cornea is normal and clear. Lens is normal.  Eyeball appears normal. No ciliary flushing, no conjunctival injection.    CHEST/LUNG:  Normal effort, no distress, no use of accessory muscles. Breath sounds normal throughout all lung fields.  Lungs are clear today. No wheezes, rales, rhonchi.     CARDIOVASCULAR:  RRR, no bruits or abnormal pulsations. No murmur noted in sitting, supine positions. no digital clubbing, cyanosis, edema, increased warmth.    ABDOMEN: normal, Bowel sounds in all 4 quadrants. Soft, non-tender, no rebound tenderness, no rigidity (guarding), no jar tenderness, no masses.  no hepatomegaly/splenomegaly, Hernias- none. Rectal not examined.     Peripheral Vascular: Normal temperature with pink nailbeds and no ulcerations. Pedal hair intact.  Normal capillary refill. No edema.    Neurological: Moves all 4 extremities symmetrically. Symmetrical face and body posture. CN evaluated II-XII and intact. PERRLA, Normal EOMI, visual/special senses appear intact, Face is symmetrical and normal sensation/movement, normal tongue, normal strength/posture of neck musculature.  5/5 bilateral UE and LE.  Heel shin intact. Able to walk normal gait, normal heel toe walking.      Neuropsych: normal mood and congruent affect. Able to articulate well, normal speech, normal rate, normal tone, normal use of language, volume and coherence.  no SI/HI, no hallucinations, delusions, obsessions.   Judgement- Appropriate. Memory-Recall intact, remote and recent memory intact. Knowledge- Age appropriate fund of knowledge, concentration and attention span normal.    Lymphatic: Head/Neck- normal size and non tender to palpation. Axillary- Head and neck LN are normal size and non tender to palpation.    Assessment/Plan:  Yesi was seen today for vomiting.    Diagnoses and all orders for this visit:    Anaphylactic reaction due to tree nuts and seeds, initial encounter  -     Ambulatory Referral to Allergy    Gastroesophageal reflux disease without esophagitis  -     omeprazole (priLOSEC) 40 MG capsule; Take 1 capsule by mouth Daily.    Nausea and vomiting, intractability of vomiting not specified, unspecified vomiting type  -     Helicobacter Pylori, IgA IgG IgM  -     ondansetron ODT (ZOFRAN ODT) 4 MG disintegrating tablet; Take 1 tablet by mouth Every 8 (Eight) Hours As Needed for Nausea.        Morbid obesity:  BMI:   30.7    Weight:    202    Follow up plan:  Lose at least 3 pounds before next chronic visit, exercise at least 3 times a week for 30 minute increments, eat baked instead of fried foods, and eat healthier     -  Documentation of education   The patient BMI is outside this range and we recommended/discussed today to utilize a diet/exercise program to get back into the appropriate range.  Federal guidelines recommend that people under the age of 65 should have a BMI of 18.5-24.9  Food diary:  Bring to next visit  tial step is to document everything that is consumed. Pt's that have a food diary  Lose 2x the weight  by keeping track of foods.   Choose one bad food weekly and eliminate it from your diet.  Replace with one healthy food  Exercise diary: Goal over next 2-4 weeks is walk 30 minutes per day 5 days per week at pace difficult to hold conversation.   Drink more water, less soda.   Cut back on portion sizes.   Today we encouraged roughly a 1 lb per week weight loss with initial goal of 5% weight  loss.  Avoid fast foods, eat more salads  If eating out for a meal, consider cutting food in half and placing into a to go container.  Individually portion any foods coming into the home   Use smaller plates  Drink 12 ozof water 30 minutes before meal as way to suppress appetite.  Discussed Contrave, metformin, topamax, Belviq and the cost of medications  Encouraged internet programs or self help books  Set  Goals that are realistic   Educated on ways to measure food  Baseball: 1 cup good for green salad, frozen yogurt, medium piece of fruit  Handful:  ½ cup good cut fruit, cooked vegetables, pasta, rice  Egg:  ¼ cup good for dried fruit  Deck of cards: 3 ounces good for meat and poultry  Check book:  3 ounces grilled fish  Plate Method:  reduce plate size to 9 inch dinner plate.  Half of plate should be filled with non-starchy vegetables (broccoli, lettuce, cauliflower, tomatoes), ¼ plate with lean source of protein (lean chicken, turkey, fish), remaining ½ with whole grains (brown rice, potato, whole grain breads  Avoid liquid calories (regular soda, juice, coffee with cream)  Focus  on water, seltzer water and other non-calorie drinks  Replace regular sugar with non-caloric sweeteners  Avoid skipping meals: plan small regular meals throughout the day in order to keep your hunger controlled  Consider using meal replacements if unable to plan a healthy meal (protein shake, high protein bar)  Replace all white bread with whole wheat/whole grain alternative  Swap regular salad dressings (mayonnaise, butter, or low fat or fat free alternative)  Avoid high fat, high calorie, high carbohydrate snakes (cookies, pastries, cakes)  Snack on fruits, low fat dairy (yogurt, cottage cheese)            Management plan:  Take medications as prescribed; return to the clinic of new or worsening concerns.       Risks/benefits of current and new medications discussed with the patient and or family today.  The patient/family are aware  and accept that if there any side effects they should call or return to clinic as soon as possible.  Appropriate F/U discussed for topics addressed today. All questions were answered to the satisfactory state of patient/family.  Should symptoms fail to improve or worsen they agree to call or return to clinic or to go to the ER. Education handouts were offered on any new Rx if requested.  Discussed the importance of following up with any needed screening tests/labs/specialist appointments and any requested follow-up recommended by me today.  Importance of maintaining follow-up discussed and patient accepts that missed appointments can delay diagnosis and potentially lead to worsening of conditions.    Return in about 1 week (around 11/13/2019), or if symptoms worsen or fail to improve.    Electronically signed by Leatha Craig, SONU, APRN, 11/06/19, 3:29 PM.

## 2019-11-07 LAB
H PYLORI IGA SER-ACNC: <9 UNITS (ref 0–8.9)
H PYLORI IGG SER IA-ACNC: 0.33 INDEX VALUE (ref 0–0.79)
H PYLORI IGM SER-ACNC: <9 UNITS (ref 0–8.9)

## 2019-11-13 DIAGNOSIS — R10.84 GENERALIZED ABDOMINAL PAIN: ICD-10-CM

## 2019-11-13 DIAGNOSIS — R11.2 NAUSEA AND VOMITING, INTRACTABILITY OF VOMITING NOT SPECIFIED, UNSPECIFIED VOMITING TYPE: Primary | ICD-10-CM

## 2019-11-19 ENCOUNTER — OFFICE VISIT (OUTPATIENT)
Dept: GASTROENTEROLOGY | Facility: CLINIC | Age: 49
End: 2019-11-19

## 2019-11-19 VITALS
HEIGHT: 68 IN | WEIGHT: 205 LBS | HEART RATE: 74 BPM | SYSTOLIC BLOOD PRESSURE: 138 MMHG | BODY MASS INDEX: 31.07 KG/M2 | DIASTOLIC BLOOD PRESSURE: 80 MMHG | OXYGEN SATURATION: 97 %

## 2019-11-19 DIAGNOSIS — R10.33 PERIUMBILICAL ABDOMINAL PAIN: ICD-10-CM

## 2019-11-19 DIAGNOSIS — R11.2 NAUSEA AND VOMITING, INTRACTABILITY OF VOMITING NOT SPECIFIED, UNSPECIFIED VOMITING TYPE: Primary | ICD-10-CM

## 2019-11-19 DIAGNOSIS — F19.11 H/O: SUBSTANCE ABUSE (HCC): ICD-10-CM

## 2019-11-19 DIAGNOSIS — R19.7 DIARRHEA, UNSPECIFIED TYPE: ICD-10-CM

## 2019-11-19 DIAGNOSIS — C53.9 MALIGNANT NEOPLASM OF CERVIX, UNSPECIFIED SITE (HCC): ICD-10-CM

## 2019-11-19 DIAGNOSIS — K63.5 HYPERPLASTIC COLONIC POLYP, UNSPECIFIED PART OF COLON: ICD-10-CM

## 2019-11-19 PROCEDURE — 99214 OFFICE O/P EST MOD 30 MIN: CPT | Performed by: NURSE PRACTITIONER

## 2019-11-19 RX ORDER — SODIUM, POTASSIUM,MAG SULFATES 17.5-3.13G
SOLUTION, RECONSTITUTED, ORAL ORAL
Qty: 2 BOTTLE | Refills: 0 | Status: SHIPPED | OUTPATIENT
Start: 2019-11-19 | End: 2021-02-03

## 2019-11-19 NOTE — PROGRESS NOTES
Yesi Bowman  1970    2019  Chief Complaint   Patient presents with   • GI Problem     Nausea and vomiting     Subjective   HPI  Yesi Bowman is a 49 y.o. female who presents with a complaint of acute onset severe nausea, vomiting, diarrhea, and abdominal pain and bloating.  No blood seen in her bowels.  Her PCP called us last week to make an appointment related to this acute episode as described.  She was then also having fever and just felt overall terrible.  SHe is a  and missed couple days of school because of it.   No blood work or abdominal imaging was ordered.  She did get tested for H-Pylori which was negative. Today the patient reports all her symptoms have completely resolved.   Patient states no one else in the family was sick.  Patient reports that she will have episodes like this about every 2 months which lasts several 3-6 days and usually resolves on its own.  She is wondering if it is diverticulitis or food allergy.    No current fever.  Today her stools are solid again with no blood. Also today she has No further nausea or vomiting and her appetite is good with No unexpected weight loss.    Her last colonoscopy was in 2016 at which time she had a hyperplastic polyp removed.  NO family history of colon cancer or stomach cancer.    The most recent labs found were collected on 10/10/2019 at which time her HGB was normal at 12.4, WBC normal at 5.17 and all LFT's were normal.    Past Medical History:   Diagnosis Date   • Alcoholism (CMS/HCC)    • Anemia    • Cervical cancer (CMS/HCC)    • Depression    • Drug therapy    • History of radiation therapy     5040 cGy to cervix + 3000 cGy (HDRs x 5)   • History of transfusion    • Hx of colonic polyps    • Hx of radiation therapy    • Substance abuse (CMS/HCC)      Past Surgical History:   Procedure Laterality Date   • APPENDECTOMY     • CERVICAL BIOPSY     •  SECTION     • CHOLECYSTECTOMY WITH INTRAOPERATIVE  CHOLANGIOGRAM N/A 3/9/2018    Procedure: CHOLECYSTECTOMY LAPAROSCOPIC ;  Surgeon: Karla Lee MD;  Location: [facility];  Service:    • COLONOSCOPY  2016    Hyperplastic polyp at 20 cm repeat exam in 5 years   • CYSTOSCOPY      Diagnostic   • DILATATION AND CURETTAGE     • HYSTERECTOMY     • KNEE SURGERY     • OOPHORECTOMY     • PELVIC EXAMINATION UNDER ANESTHESIA     • TONSILLECTOMY     • TOTAL LAPAROSCOPIC HYSTERECTOMY WITH DAVINCI ROBOT         Outpatient Medications Marked as Taking for the 19 encounter (Office Visit) with Tabatha Mejía APRN   Medication Sig Dispense Refill   • cholecalciferol (VITAMIN D3) 1000 units tablet Take 1,000 Units by mouth Daily.     • Omega-3 Fatty Acids (FISH OIL) 1000 MG capsule capsule Take 1,000 mg by mouth Daily With Breakfast.     • omeprazole (priLOSEC) 40 MG capsule Take 1 capsule by mouth Daily. 30 capsule 1   • ondansetron ODT (ZOFRAN ODT) 4 MG disintegrating tablet Take 1 tablet by mouth Every 8 (Eight) Hours As Needed for Nausea. 20 tablet 0   • sertraline (ZOLOFT) 100 MG tablet Take 100 mg by mouth Daily.     • traZODone (DESYREL) 150 MG tablet Take 50 mg by mouth Every Night.       No Known Allergies  Social History     Socioeconomic History   • Marital status:      Spouse name: Not on file   • Number of children: Not on file   • Years of education: Not on file   • Highest education level: Not on file   Tobacco Use   • Smoking status: Former Smoker     Packs/day: 0.50     Types: Cigarettes     Last attempt to quit: 2016     Years since quittin.8   • Smokeless tobacco: Never Used   Substance and Sexual Activity   • Alcohol use: No   • Drug use: No   • Sexual activity: No     Family History   Problem Relation Age of Onset   • Skin cancer Mother    • Cancer Maternal Grandmother         Malignancy   • Cancer Paternal Grandmother         Malignancy   • Colon cancer Neg Hx    • Colon polyps Neg Hx      Health Maintenance   Topic Date Due  "  • ANNUAL PHYSICAL  10/30/1975   • TDAP/TD VACCINES (1 - Tdap) 10/30/1991   • INFLUENZA VACCINE  08/01/2019     Review of Systems   Constitutional: Positive for fatigue and fever. Negative for activity change, appetite change, chills, diaphoresis and unexpected weight change.        During last weeks episode she had fever and chills.  Currently resolved.   HENT: Negative for ear pain, hearing loss, mouth sores, sore throat, trouble swallowing and voice change.    Eyes: Negative.    Respiratory: Negative for cough, choking, shortness of breath and wheezing.    Cardiovascular: Negative for chest pain and palpitations.   Gastrointestinal: Positive for abdominal pain, diarrhea, nausea and vomiting. Negative for blood in stool and constipation.        During last week's episode she had these complaints and today is asymptomatic   Endocrine: Negative for cold intolerance and heat intolerance.   Genitourinary: Negative for decreased urine volume, dysuria, frequency, hematuria and urgency.   Musculoskeletal: Negative for back pain, gait problem and myalgias.   Skin: Negative for color change, pallor and rash.   Allergic/Immunologic: Negative for food allergies and immunocompromised state.   Neurological: Negative for dizziness, tremors, seizures, syncope, weakness, light-headedness, numbness and headaches.   Hematological: Negative for adenopathy. Does not bruise/bleed easily.   Psychiatric/Behavioral: Negative for agitation and confusion. The patient is not nervous/anxious.    All other systems reviewed and are negative.    Objective   Vitals:    11/19/19 0824   BP: 138/80   Pulse: 74   SpO2: 97%   Weight: 93 kg (205 lb)   Height: 172.7 cm (68\")     Body mass index is 31.17 kg/m².  Physical Exam   Constitutional: She is oriented to person, place, and time. She appears well-developed and well-nourished.   HENT:   Head: Normocephalic and atraumatic.   Eyes: Pupils are equal, round, and reactive to light.   Neck: Normal " range of motion. Neck supple. No tracheal deviation present.   Cardiovascular: Normal rate, regular rhythm and normal heart sounds. Exam reveals no gallop and no friction rub.   No murmur heard.  Pulmonary/Chest: Effort normal and breath sounds normal. No respiratory distress. She has no wheezes. She has no rales. She exhibits no tenderness.   Abdominal: Soft. Bowel sounds are normal. She exhibits no distension. There is no hepatosplenomegaly. There is no tenderness. There is no rigidity, no rebound and no guarding.   Musculoskeletal: Normal range of motion. She exhibits no edema, tenderness or deformity.   Neurological: She is alert and oriented to person, place, and time. She has normal reflexes.   Skin: Skin is warm and dry. No rash noted. No pallor.   Psychiatric: She has a normal mood and affect. Her behavior is normal. Judgment and thought content normal.     Assessment/Plan   Yesi was seen today for gi problem.    Diagnoses and all orders for this visit:    Nausea and vomiting, intractability of vomiting not specified, unspecified vomiting type  Comments:  RESOLVED TODAY  Orders:  -     Case Request; Standing  -     Follow Anesthesia Guidelines / Standing Orders; Future  -     Obtain Informed Consent; Future  -     Implement Anesthesia Orders Day of Procedure; Standing  -     Obtain Informed Consent; Standing  -     Verify Bowel Prep Was Successful; Standing  -     Case Request  -     sodium-potassium-magnesium sulfates (SUPREP BOWEL PREP KIT) 17.5-3.13-1.6 GM/177ML solution oral solution; Take as directed per office    Periumbilical abdominal pain  Comments:  Resolved at present but had been associated with bloating  Orders:  -     Case Request; Standing  -     Follow Anesthesia Guidelines / Standing Orders; Future  -     Obtain Informed Consent; Future  -     Implement Anesthesia Orders Day of Procedure; Standing  -     Obtain Informed Consent; Standing  -     Verify Bowel Prep Was Successful; Standing  -      Case Request    Diarrhea, unspecified type  Comments:  improved  Orders:  -     Case Request; Standing  -     Follow Anesthesia Guidelines / Standing Orders; Future  -     Obtain Informed Consent; Future  -     Implement Anesthesia Orders Day of Procedure; Standing  -     Obtain Informed Consent; Standing  -     Verify Bowel Prep Was Successful; Standing  -     Case Request    Hyperplastic colonic polyp, unspecified part of colon  Comments:  at 20cm (2016)  Orders:  -     Case Request; Standing  -     Follow Anesthesia Guidelines / Standing Orders; Future  -     Obtain Informed Consent; Future  -     Implement Anesthesia Orders Day of Procedure; Standing  -     Obtain Informed Consent; Standing  -     Verify Bowel Prep Was Successful; Standing  -     Case Request    Malignant neoplasm of cervix, unspecified site (CMS/[facility])  Comments:  2014, s/p radiation, radical hysterectomy, chemotherapy    H/O: substance abuse (CMS/[facility])  Comments:  NONE in 3-4 years      ESOPHAGOGASTRODUODENOSCOPY WITH ANESTHESIA (N/A), COLONOSCOPY WITH ANESTHESIA (N/A)  EMR Dragon/transcription disclaimer: Much of this encounter note is electronic transcription/translation of spoken language to printed text. The electronic translation of spoken language may be erroneous, or at times, nonsensical words or phrases may be inadvertently transcribed. Although I have reviewed the note for such errors, some may still exist.  Body mass index is 31.17 kg/m².  No Follow-up on file.    Patient's Body mass index is 31.17 kg/m². BMI is above normal parameters. Recommendations include: nutrition counseling.    Patient is to call us if these symptoms arise again before her endoscopy and colonoscopy and I would then collect labs during her acute phase.    All risks, benefits, alternatives, and indications of colonoscopy and/or Endoscopy procedure have been discussed with the patient. Risks to include perforation of the colon requiring possible surgery or  colostomy, risk of bleeding from biopsies or removal of colon tissue, possibility of missing a colon polyp or cancer, or adverse drug reaction.  Benefits to include the diagnosis and management of disease of the colon and rectum. Alternatives to include barium enema, radiographic evaluation, lab testing or no intervention. Pt verbalizes understanding and agrees.     Tabatha FRAZIERMarisa Mejía, APRN  11/19/2019  9:18 AM      Obesity, Adult  Obesity is the condition of having too much total body fat. Being overweight or obese means that your weight is greater than what is considered healthy for your body size. Obesity is determined by a measurement called BMI. BMI is an estimate of body fat and is calculated from height and weight. For adults, a BMI of 30 or higher is considered obese.  Obesity can eventually lead to other health concerns and major illnesses, including:  · Stroke.  · Coronary artery disease (CAD).  · Type 2 diabetes.  · Some types of cancer, including cancers of the colon, breast, uterus, and gallbladder.  · Osteoarthritis.  · High blood pressure (hypertension).  · High cholesterol.  · Sleep apnea.  · Gallbladder stones.  · Infertility problems.  What are the causes?  The main cause of obesity is taking in (consuming) more calories than your body uses for energy. Other factors that contribute to this condition may include:  · Being born with genes that make you more likely to become obese.  · Having a medical condition that causes obesity. These conditions include:  ¨ Hypothyroidism.  ¨ Polycystic ovarian syndrome (PCOS).  ¨ Binge-eating disorder.  ¨ Cushing syndrome.  · Taking certain medicines, such as steroids, antidepressants, and seizure medicines.  · Not being physically active (sedentary lifestyle).  · Living where there are limited places to exercise safely or buy healthy foods.  · Not getting enough sleep.  What increases the risk?  The following factors may increase your risk of this  condition:  · Having a family history of obesity.  · Being a woman of -American descent.  · Being a man of  descent.  What are the signs or symptoms?  Having excessive body fat is the main symptom of this condition.  How is this diagnosed?  This condition may be diagnosed based on:  · Your symptoms.  · Your medical history.  · A physical exam. Your health care provider may measure:  ¨ Your BMI. If you are an adult with a BMI between 25 and less than 30, you are considered overweight. If you are an adult with a BMI of 30 or higher, you are considered obese.  ¨ The distances around your hips and your waist (circumferences). These may be compared to each other to help diagnose your condition.  ¨ Your skinfold thickness. Your health care provider may gently pinch a fold of your skin and measure it.  How is this treated?  Treatment for this condition often includes changing your lifestyle. Treatment may include some or all of the following:  · Dietary changes. Work with your health care provider and a dietitian to set a weight-loss goal that is healthy and reasonable for you. Dietary changes may include eating:  ¨ Smaller portions. A portion size is the amount of a particular food that is healthy for you to eat at one time. This varies from person to person.  ¨ Low-calorie or low-fat options.  ¨ More whole grains, fruits, and vegetables.  · Regular physical activity. This may include aerobic activity (cardio) and strength training.  · Medicine to help you lose weight. Your health care provider may prescribe medicine if you are unable to lose 1 pound a week after 6 weeks of eating more healthily and doing more physical activity.  · Surgery. Surgical options may include gastric banding and gastric bypass. Surgery may be done if:  ¨ Other treatments have not helped to improve your condition.  ¨ You have a BMI of 40 or higher.  ¨ You have life-threatening health problems related to obesity.  Follow these  instructions at home:     Eating and drinking     · Follow recommendations from your health care provider about what you eat and drink. Your health care provider may advise you to:  ¨ Limit fast foods, sweets, and processed snack foods.  ¨ Choose low-fat options, such as low-fat milk instead of whole milk.  ¨ Eat 5 or more servings of fruits or vegetables every day.  ¨ Eat at home more often. This gives you more control over what you eat.  ¨ Choose healthy foods when you eat out.  ¨ Learn what a healthy portion size is.  ¨ Keep low-fat snacks on hand.  ¨ Avoid sugary drinks, such as soda, fruit juice, iced tea sweetened with sugar, and flavored milk.  ¨ Eat a healthy breakfast.  · Drink enough water to keep your urine clear or pale yellow.  · Do not go without eating for long periods of time (do not fast) or follow a fad diet. Fasting and fad diets can be unhealthy and even dangerous.  Physical Activity   · Exercise regularly, as told by your health care provider. Ask your health care provider what types of exercise are safe for you and how often you should exercise.  · Warm up and stretch before being active.  · Cool down and stretch after being active.  · Rest between periods of activity.  Lifestyle   · Limit the time that you spend in front of your TV, computer, or video game system.  · Find ways to reward yourself that do not involve food.  · Limit alcohol intake to no more than 1 drink a day for nonpregnant women and 2 drinks a day for men. One drink equals 12 oz of beer, 5 oz of wine, or 1½ oz of hard liquor.  General instructions   · Keep a weight loss journal to keep track of the food you eat and how much you exercise you get.  · Take over-the-counter and prescription medicines only as told by your health care provider.  · Take vitamins and supplements only as told by your health care provider.  · Consider joining a support group. Your health care provider may be able to recommend a support group.  · Keep  all follow-up visits as told by your health care provider. This is important.  Contact a health care provider if:  · You are unable to meet your weight loss goal after 6 weeks of dietary and lifestyle changes.  This information is not intended to replace advice given to you by your health care provider. Make sure you discuss any questions you have with your health care provider.  Document Released: 01/25/2006 Document Revised: 05/22/2017 Document Reviewed: 10/05/2016  [employer] Interactive Patient Education © 2017 [employer]      If you smoke or use tobacco, 4 minutes reading provided  Steps to Quit Smoking  Smoking tobacco can be harmful to your health and can affect almost every organ in your body. Smoking puts you, and those around you, at risk for developing many serious chronic diseases. Quitting smoking is difficult, but it is one of the best things that you can do for your health. It is never too late to quit.  What are the benefits of quitting smoking?  When you quit smoking, you lower your risk of developing serious diseases and conditions, such as:  · Lung cancer or lung disease, such as COPD.  · Heart disease.  · Stroke.  · Heart attack.  · Infertility.  · Osteoporosis and bone fractures.  Additionally, symptoms such as coughing, wheezing, and shortness of breath may get better when you quit. You may also find that you get sick less often because your body is stronger at fighting off colds and infections. If you are pregnant, quitting smoking can help to reduce your chances of having a baby of low birth weight.  How do I get ready to quit?  When you decide to quit smoking, create a plan to make sure that you are successful. Before you quit:  · Pick a date to quit. Set a date within the next two weeks to give you time to prepare.  · Write down the reasons why you are quitting. Keep this list in places where you will see it often, such as on your bathroom mirror or in your car or wallet.  · Identify the  people, places, things, and activities that make you want to smoke (triggers) and avoid them. Make sure to take these actions:  ¨ Throw away all cigarettes at home, at work, and in your car.  ¨ Throw away smoking accessories, such as ashtrays and lighters.  ¨ Clean your car and make sure to empty the ashtray.  ¨ Clean your home, including curtains and carpets.  · Tell your family, friends, and coworkers that you are quitting. Support from your loved ones can make quitting easier.  · Talk with your health care provider about your options for quitting smoking.  · Find out what treatment options are covered by your health insurance.  What strategies can I use to quit smoking?  Talk with your healthcare provider about different strategies to quit smoking. Some strategies include:  · Quitting smoking altogether instead of gradually lessening how much you smoke over a period of time. Research shows that quitting “cold turkey” is more successful than gradually quitting.  · Attending in-person counseling to help you build problem-solving skills. You are more likely to have success in quitting if you attend several counseling sessions. Even short sessions of 10 minutes can be effective.  · Finding resources and support systems that can help you to quit smoking and remain smoke-free after you quit. These resources are most helpful when you use them often. They can include:  ¨ Online chats with a counselor.  ¨ Telephone quitlines.  ¨ Printed self-help materials.  ¨ Support groups or group counseling.  ¨ Text messaging programs.  ¨ Mobile phone applications.  · Taking medicines to help you quit smoking. (If you are pregnant or breastfeeding, talk with your health care provider first.) Some medicines contain nicotine and some do not. Both types of medicines help with cravings, but the medicines that include nicotine help to relieve withdrawal symptoms. Your health care provider may recommend:  ¨ Nicotine patches, gum, or  lozenges.  ¨ Nicotine inhalers or sprays.  ¨ Non-nicotine medicine that is taken by mouth.  Talk with your health care provider about combining strategies, such as taking medicines while you are also receiving in-person counseling. Using these two strategies together makes you more likely to succeed in quitting than if you used either strategy on its own.  If you are pregnant or breastfeeding, talk with your health care provider about finding counseling or other support strategies to quit smoking. Do not take medicine to help you quit smoking unless told to do so by your health care provider.  What things can I do to make it easier to quit?  Quitting smoking might feel overwhelming at first, but there is a lot that you can do to make it easier. Take these important actions:  · Reach out to your family and friends and ask that they support and encourage you during this time. Call telephone quitlines, reach out to support groups, or work with a counselor for support.  · Ask people who smoke to avoid smoking around you.  · Avoid places that trigger you to smoke, such as bars, parties, or smoke-break areas at work.  · Spend time around people who do not smoke.  · Lessen stress in your life, because stress can be a smoking trigger for some people. To lessen stress, try:  ¨ Exercising regularly.  ¨ Deep-breathing exercises.  ¨ Yoga.  ¨ Meditating.  ¨ Performing a body scan. This involves closing your eyes, scanning your body from head to toe, and noticing which parts of your body are particularly tense. Purposefully relax the muscles in those areas.  · Download or purchase mobile phone or tablet apps (applications) that can help you stick to your quit plan by providing reminders, tips, and encouragement. There are many free apps, such as QuitGuide from the CDC (Centers for Disease Control and Prevention). You can find other support for quitting smoking (smoking cessation) through smokefree.gov and other websites.  How  will I feel when I quit smoking?  Within the first 24 hours of quitting smoking, you may start to feel some withdrawal symptoms. These symptoms are usually most noticeable 2-3 days after quitting, but they usually do not last beyond 2-3 weeks. Changes or symptoms that you might experience include:  · Mood swings.  · Restlessness, anxiety, or irritation.  · Difficulty concentrating.  · Dizziness.  · Strong cravings for sugary foods in addition to nicotine.  · Mild weight gain.  · Constipation.  · Nausea.  · Coughing or a sore throat.  · Changes in how your medicines work in your body.  · A depressed mood.  · Difficulty sleeping (insomnia).  After the first 2-3 weeks of quitting, you may start to notice more positive results, such as:  · Improved sense of smell and taste.  · Decreased coughing and sore throat.  · Slower heart rate.  · Lower blood pressure.  · Clearer skin.  · The ability to breathe more easily.  · Fewer sick days.  Quitting smoking is very challenging for most people. Do not get discouraged if you are not successful the first time. Some people need to make many attempts to quit before they achieve long-term success. Do your best to stick to your quit plan, and talk with your health care provider if you have any questions or concerns.  This information is not intended to replace advice given to you by your health care provider. Make sure you discuss any questions you have with your health care provider.  Document Released: 12/12/2002 Document Revised: 08/15/2017 Document Reviewed: 05/03/2016  [employer] Interactive Patient Education © 2017 [employer]

## 2019-11-21 ENCOUNTER — HOSPITAL ENCOUNTER (OUTPATIENT)
Facility: HOSPITAL | Age: 49
Setting detail: HOSPITAL OUTPATIENT SURGERY
Discharge: HOME | End: 2019-11-21
Attending: INTERNAL MEDICINE | Admitting: INTERNAL MEDICINE

## 2019-11-21 ENCOUNTER — ANESTHESIA (OUTPATIENT)
Dept: GASTROENTEROLOGY | Facility: HOSPITAL | Age: 49
End: 2019-11-21

## 2019-11-21 ENCOUNTER — TELEPHONE (OUTPATIENT)
Dept: FAMILY MEDICINE CLINIC | Facility: CLINIC | Age: 49
End: 2019-11-21

## 2019-11-21 ENCOUNTER — ANESTHESIA EVENT (OUTPATIENT)
Dept: GASTROENTEROLOGY | Facility: HOSPITAL | Age: 49
End: 2019-11-21

## 2019-11-21 VITALS
SYSTOLIC BLOOD PRESSURE: 101 MMHG | HEART RATE: 83 BPM | TEMPERATURE: 97.9 F | DIASTOLIC BLOOD PRESSURE: 62 MMHG | OXYGEN SATURATION: 97 % | WEIGHT: 197 LBS | RESPIRATION RATE: 12 BRPM | HEIGHT: 68 IN | BODY MASS INDEX: 29.86 KG/M2

## 2019-11-21 DIAGNOSIS — R11.2 NAUSEA AND VOMITING, INTRACTABILITY OF VOMITING NOT SPECIFIED, UNSPECIFIED VOMITING TYPE: ICD-10-CM

## 2019-11-21 DIAGNOSIS — R19.7 DIARRHEA, UNSPECIFIED TYPE: ICD-10-CM

## 2019-11-21 DIAGNOSIS — K63.5 HYPERPLASTIC COLONIC POLYP, UNSPECIFIED PART OF COLON: ICD-10-CM

## 2019-11-21 DIAGNOSIS — R10.33 PERIUMBILICAL ABDOMINAL PAIN: ICD-10-CM

## 2019-11-21 PROCEDURE — 45380 COLONOSCOPY AND BIOPSY: CPT | Performed by: INTERNAL MEDICINE

## 2019-11-21 PROCEDURE — 45385 COLONOSCOPY W/LESION REMOVAL: CPT | Performed by: INTERNAL MEDICINE

## 2019-11-21 PROCEDURE — 88305 TISSUE EXAM BY PATHOLOGIST: CPT | Performed by: INTERNAL MEDICINE

## 2019-11-21 PROCEDURE — 25010000002 PROPOFOL 10 MG/ML EMULSION: Performed by: NURSE ANESTHETIST, CERTIFIED REGISTERED

## 2019-11-21 PROCEDURE — 43239 EGD BIOPSY SINGLE/MULTIPLE: CPT | Performed by: INTERNAL MEDICINE

## 2019-11-21 RX ORDER — PROPOFOL 10 MG/ML
VIAL (ML) INTRAVENOUS AS NEEDED
Status: DISCONTINUED | OUTPATIENT
Start: 2019-11-21 | End: 2019-11-21 | Stop reason: SURG

## 2019-11-21 RX ORDER — SODIUM CHLORIDE 9 MG/ML
500 INJECTION, SOLUTION INTRAVENOUS CONTINUOUS PRN
Status: DISCONTINUED | OUTPATIENT
Start: 2019-11-21 | End: 2019-11-21 | Stop reason: HOSPADM

## 2019-11-21 RX ORDER — SODIUM CHLORIDE 0.9 % (FLUSH) 0.9 %
10 SYRINGE (ML) INJECTION AS NEEDED
Status: DISCONTINUED | OUTPATIENT
Start: 2019-11-21 | End: 2019-11-21 | Stop reason: HOSPADM

## 2019-11-21 RX ADMIN — PROPOFOL 500 MG: 10 INJECTION, EMULSION INTRAVENOUS at 09:30

## 2019-11-21 RX ADMIN — SODIUM CHLORIDE 500 ML: 9 INJECTION, SOLUTION INTRAVENOUS at 07:56

## 2019-11-21 RX ADMIN — LIDOCAINE HYDROCHLORIDE 100 MG: 20 INJECTION, SOLUTION INTRAVENOUS at 09:30

## 2019-11-21 NOTE — ANESTHESIA POSTPROCEDURE EVALUATION
"Patient: Yesi Bowman    Procedure Summary     Date:  11/21/19 Room / Location:  [facility] ENDOSCOPY 4 / [facility] ENDOSCOPY    Anesthesia Start:  0927 Anesthesia Stop:  0957    Procedures:       ESOPHAGOGASTRODUODENOSCOPY WITH ANESTHESIA (N/A )      COLONOSCOPY WITH ANESTHESIA (N/A ) Diagnosis:       Nausea and vomiting, intractability of vomiting not specified, unspecified vomiting type      Periumbilical abdominal pain      Diarrhea, unspecified type      Hyperplastic colonic polyp, unspecified part of colon      (Nausea and vomiting, intractability of vomiting not specified, unspecified vomiting type [R11.2])      (Periumbilical abdominal pain [R10.33])      (Diarrhea, unspecified type [R19.7])      (Hyperplastic colonic polyp, unspecified part of colon [K63.5])    Surgeon:  Kiran Gonzalez MD Provider:  Rudy Garcia CRNA    Anesthesia Type:  MAC ASA Status:  2          Anesthesia Type: MAC  Last vitals  BP   101/62 (11/21/19 0957)   Temp   97.9 °F (36.6 °C) (11/21/19 0740)   Pulse   83 (11/21/19 0957)   Resp   12 (11/21/19 0957)     SpO2   97 % (11/21/19 0957)     Post Anesthesia Care and Evaluation    Patient location during evaluation: PHASE II  Patient participation: complete - patient participated  Level of consciousness: awake and alert  Pain management: adequate  Airway patency: patent  Anesthetic complications: No anesthetic complications    Cardiovascular status: acceptable  Respiratory status: acceptable  Hydration status: acceptable    Comments: Blood pressure 101/62, pulse 83, temperature 97.9 °F (36.6 °C), temperature source Temporal, resp. rate 12, height 172.7 cm (68\"), weight 89.4 kg (197 lb), SpO2 97 %, not currently breastfeeding.    Pt discharged from PACU based on donna score >8      "

## 2019-11-21 NOTE — TELEPHONE ENCOUNTER
Patient called wanting to let Abbey know that her Endo and Colonoscopy came back good. She stated she needed a referral to an Allergist. She wants to know if she can get in before the end of the year before her insurance rolls over.     Please call at [phone]

## 2019-11-21 NOTE — ANESTHESIA PREPROCEDURE EVALUATION
Anesthesia Evaluation     no history of anesthetic complications:  NPO Solid Status: > 8 hours  NPO Liquid Status: > 2 hours           Airway   Mallampati: I  TM distance: >3 FB  Neck ROM: full  No difficulty expected  Dental          Pulmonary    (-) sleep apnea, not a smoker  Cardiovascular   Exercise tolerance: good (4-7 METS)    (-) hypertension, CAD      Neuro/Psych  (-) seizures, TIA, CVA  GI/Hepatic/Renal/Endo    (+) obesity,     (-) liver disease, no renal disease, diabetes    Musculoskeletal     Abdominal    Substance History   (+) alcohol use (h/o etoh abuse, pt reports this was years ago),      OB/GYN          Other      history of cancer (cervical cancer) remission                  Anesthesia Plan    ASA 2     MAC     intravenous induction     Anesthetic plan, all risks, benefits, and alternatives have been provided, discussed and informed consent has been obtained with: patient.

## 2019-11-22 LAB
CYTO UR: NORMAL
LAB AP CASE REPORT: NORMAL
PATH REPORT.FINAL DX SPEC: NORMAL
PATH REPORT.GROSS SPEC: NORMAL

## 2019-12-06 DIAGNOSIS — K21.9 GASTROESOPHAGEAL REFLUX DISEASE WITHOUT ESOPHAGITIS: ICD-10-CM

## 2019-12-06 RX ORDER — OMEPRAZOLE 40 MG/1
40 CAPSULE, DELAYED RELEASE ORAL DAILY
Qty: 30 CAPSULE | Refills: 1 | OUTPATIENT
Start: 2019-12-06

## 2019-12-30 DIAGNOSIS — K21.9 GASTROESOPHAGEAL REFLUX DISEASE WITHOUT ESOPHAGITIS: ICD-10-CM

## 2019-12-30 RX ORDER — OMEPRAZOLE 40 MG/1
40 CAPSULE, DELAYED RELEASE ORAL DAILY
Qty: 90 CAPSULE | Refills: 0 | Status: SHIPPED | OUTPATIENT
Start: 2019-12-30 | End: 2023-03-14

## 2020-04-14 ENCOUNTER — APPOINTMENT (OUTPATIENT)
Dept: LAB | Facility: HOSPITAL | Age: 50
End: 2020-04-14

## 2020-06-03 DIAGNOSIS — C53.9 MALIGNANT NEOPLASM OF CERVIX, UNSPECIFIED SITE (HCC): Primary | ICD-10-CM

## 2020-06-08 ENCOUNTER — APPOINTMENT (OUTPATIENT)
Dept: LAB | Facility: HOSPITAL | Age: 50
End: 2020-06-08

## 2020-06-11 ENCOUNTER — OFFICE VISIT (OUTPATIENT)
Dept: ONCOLOGY | Facility: CLINIC | Age: 50
End: 2020-06-11

## 2020-06-11 ENCOUNTER — LAB (OUTPATIENT)
Dept: LAB | Facility: HOSPITAL | Age: 50
End: 2020-06-11

## 2020-06-11 VITALS
BODY MASS INDEX: 29.25 KG/M2 | HEIGHT: 68 IN | WEIGHT: 193 LBS | RESPIRATION RATE: 18 BRPM | HEART RATE: 69 BPM | SYSTOLIC BLOOD PRESSURE: 115 MMHG | DIASTOLIC BLOOD PRESSURE: 65 MMHG | TEMPERATURE: 97 F | OXYGEN SATURATION: 98 %

## 2020-06-11 DIAGNOSIS — C53.9 MALIGNANT NEOPLASM OF CERVIX, UNSPECIFIED SITE (HCC): Primary | ICD-10-CM

## 2020-06-11 LAB
ALBUMIN SERPL-MCNC: 4.6 G/DL (ref 3.5–5.2)
ALBUMIN/GLOB SERPL: 1.9 G/DL
ALP SERPL-CCNC: 50 U/L (ref 39–117)
ALT SERPL W P-5'-P-CCNC: <5 U/L (ref 1–33)
ANION GAP SERPL CALCULATED.3IONS-SCNC: 11 MMOL/L (ref 5–15)
AST SERPL-CCNC: 10 U/L (ref 1–32)
BASOPHILS # BLD AUTO: 0.04 10*3/MM3 (ref 0–0.2)
BASOPHILS NFR BLD AUTO: 0.5 % (ref 0–1.5)
BILIRUB SERPL-MCNC: 0.3 MG/DL (ref 0.2–1.2)
BUN BLD-MCNC: 15 MG/DL (ref 6–20)
BUN/CREAT SERPL: 20.3 (ref 7–25)
CALCIUM SPEC-SCNC: 9.9 MG/DL (ref 8.6–10.5)
CHLORIDE SERPL-SCNC: 107 MMOL/L (ref 98–107)
CO2 SERPL-SCNC: 27 MMOL/L (ref 22–29)
CREAT BLD-MCNC: 0.74 MG/DL (ref 0.57–1)
DEPRECATED RDW RBC AUTO: 47.2 FL (ref 37–54)
EOSINOPHIL # BLD AUTO: 0.09 10*3/MM3 (ref 0–0.4)
EOSINOPHIL NFR BLD AUTO: 1.2 % (ref 0.3–6.2)
ERYTHROCYTE [DISTWIDTH] IN BLOOD BY AUTOMATED COUNT: 13.8 % (ref 12.3–15.4)
GFR SERPL CREATININE-BSD FRML MDRD: 84 ML/MIN/1.73
GLOBULIN UR ELPH-MCNC: 2.4 GM/DL
GLUCOSE BLD-MCNC: 87 MG/DL (ref 65–99)
HCT VFR BLD AUTO: 36.9 % (ref 34–46.6)
HGB BLD-MCNC: 12.1 G/DL (ref 12–15.9)
HOLD SPECIMEN: NORMAL
HOLD SPECIMEN: NORMAL
IMM GRANULOCYTES # BLD AUTO: 0.03 10*3/MM3 (ref 0–0.05)
IMM GRANULOCYTES NFR BLD AUTO: 0.4 % (ref 0–0.5)
LYMPHOCYTES # BLD AUTO: 2.52 10*3/MM3 (ref 0.7–3.1)
LYMPHOCYTES NFR BLD AUTO: 34.5 % (ref 19.6–45.3)
MCH RBC QN AUTO: 30 PG (ref 26.6–33)
MCHC RBC AUTO-ENTMCNC: 32.8 G/DL (ref 31.5–35.7)
MCV RBC AUTO: 91.6 FL (ref 79–97)
MONOCYTES # BLD AUTO: 0.6 10*3/MM3 (ref 0.1–0.9)
MONOCYTES NFR BLD AUTO: 8.2 % (ref 5–12)
NEUTROPHILS # BLD AUTO: 4.02 10*3/MM3 (ref 1.7–7)
NEUTROPHILS NFR BLD AUTO: 55.2 % (ref 42.7–76)
NRBC BLD AUTO-RTO: 0 /100 WBC (ref 0–0.2)
PLATELET # BLD AUTO: 201 10*3/MM3 (ref 140–450)
PMV BLD AUTO: 9.6 FL (ref 6–12)
POTASSIUM BLD-SCNC: 4 MMOL/L (ref 3.5–5.2)
PROT SERPL-MCNC: 7 G/DL (ref 6–8.5)
RBC # BLD AUTO: 4.03 10*6/MM3 (ref 3.77–5.28)
SODIUM BLD-SCNC: 145 MMOL/L (ref 136–145)
WBC NRBC COR # BLD: 7.3 10*3/MM3 (ref 3.4–10.8)

## 2020-06-11 PROCEDURE — 36415 COLL VENOUS BLD VENIPUNCTURE: CPT

## 2020-06-11 PROCEDURE — 85025 COMPLETE CBC W/AUTO DIFF WBC: CPT

## 2020-06-11 PROCEDURE — 99213 OFFICE O/P EST LOW 20 MIN: CPT | Performed by: INTERNAL MEDICINE

## 2020-06-11 PROCEDURE — 80053 COMPREHEN METABOLIC PANEL: CPT

## 2020-12-02 ENCOUNTER — APPOINTMENT (OUTPATIENT)
Dept: LAB | Facility: HOSPITAL | Age: 50
End: 2020-12-02

## 2020-12-22 ENCOUNTER — TELEPHONE (OUTPATIENT)
Dept: ONCOLOGY | Facility: CLINIC | Age: 50
End: 2020-12-22

## 2020-12-22 NOTE — TELEPHONE ENCOUNTER
PT: SELF    PT CALLING TO R/S HER MISSED APPT ON 12/2. AND SHE IS HAVING LEG PAIN AS WELL. PLEASE ADVISE  I TRIED TO GET IN JAN BUT COULDN'T GET HERE [facility].     PT #: [phone]

## 2020-12-28 DIAGNOSIS — C53.9 MALIGNANT NEOPLASM OF CERVIX, UNSPECIFIED SITE (HCC): Primary | ICD-10-CM

## 2021-01-08 ENCOUNTER — TELEPHONE (OUTPATIENT)
Dept: ONCOLOGY | Facility: CLINIC | Age: 51
End: 2021-01-08

## 2021-01-09 NOTE — PROGRESS NOTES
[facility]  HEMATOLOGY & ONCOLOGY    [facility] HEMATOLOGY AND ONCOLOGY  [address]  [place] [address] [phone]    Patient Name: Yesi Bowman  Encounter Date: 12/30/2020  YOB: 1970  Patient Number: [id number]    Chief Complaint   Patient presents with   • Cervical Cancer     Here for f/u       REASON FOR VISIT: Mayra Bowman is a very pleasant lady here today in follow-up for history of cervical cancer.  She was admitted in 2013 to the hospital after having profuse vaginal bleeding.  She was evaluated by Dr. Hernandez on 2/18/2013 was found to have a large necrotic mass in the cervix that was consistent with invasive Endo cervical adenocarcinoma, well-differentiated without lymphovascular and perineural invasion.  CAT scan of the abdomen pelvis was performed again showing a large necrotic appearing mass at the cervix measuring 7.2 cm.  CAT scan of the chest that showed no evidence of disease.      She was then referred to the [facility], gynecologic oncology service on 12/19/2013.  Patient underwent a repeat biopsy that confirmed the histology.  She then underwent a PET scan on 12/20/2013 finding diffusely enlarged cervix with no findings of local regional metastatic disease.  She also underwent a colonoscopy in January 2014 that found no evidence of high-grade dysplasia.      She then underwent a radical robotic hysterectomy and bilateral pelvic lymphadenectomy and cystoscopy on 2/11/2014.  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  The tumor was well-differentiated.  It measured 4 x 3.5 x 2 cm and stromal invasion was involving more than half the wall thickness.  Angiolymphatic invasion was present.  Vaginal cuff margin was focally positive.  1 of 5 left pelvic lymph nodes was also positive.  1 of 9 right pelvic lymph nodes were positive.  Her AJCC TNM stage was a  [id number], stage IIIb disease.  She then proceeded with concurrent chemoradiation with weekly cisplatin and radiation on 4/2/2014.  She underwent internal radiation therapy as well as hyperbaric treatments for healing.    She presents today having significant pelvic pain.  She states she will have sharp shooting pains down into her groin mainly on the left side.  She states these pains started about 3 months ago.  She states it comes and goes but has become a little bit more persistent lately.  Is having no problems urinating but states at times her stream seems to be going to the left.  Her bowels have been dark on occasions.  She has noticed no blood.  She is had no vaginal discharge or bleeding.  She states her vaginal canal is nearly closed.  She has a vaginal dilator that was to be used to help keep the vaginal canal open however she states she can barely inserted at this time.  She has had no weight loss.  Her appetite is stable.  She is had occasional nausea but no vomiting.  No headaches or dizziness no fever chills or night sweats.  No shortness of breath or cough.  She has not had a GYN exam nor a mammogram in several years.    Labs today show a normal cbc with WBC of 5.15, Hgb 12.8, Plt 190,000.  Her CMP is also normal.    PAST MEDICAL HISTORY:  ALLERGIES:  No Known Allergies    CURRENT MEDICATIONS:  Outpatient Encounter Medications as of 1/12/2021   Medication Sig Dispense Refill   • cholecalciferol (VITAMIN D3) 1000 units tablet Take 1,000 Units by mouth Daily.     • omeprazole (priLOSEC) 40 MG capsule TAKE 1 CAPSULE BY MOUTH DAILY. 90 capsule 0   • ondansetron ODT (ZOFRAN ODT) 4 MG disintegrating tablet Take 1 tablet by mouth Every 8 (Eight) Hours As Needed for Nausea. 20 tablet 0   • sertraline (ZOLOFT) 100 MG tablet Take 100 mg by mouth Daily.     • traZODone (DESYREL) 150 MG tablet Take 50 mg by mouth Every Night.     • Omega-3 Fatty Acids (FISH OIL) 1000 MG capsule capsule Take 1,000 mg by mouth  Daily With Breakfast.     • sodium-potassium-magnesium sulfates (SUPREP BOWEL PREP KIT) 17.5-3.13-1.6 GM/177ML solution oral solution Take as directed per office 2 bottle 0     No facility-administered encounter medications on file as of 2021.        ADULT ILLNESSES:  Patient Active Problem List   Diagnosis Code   • Cervical cancer (CMS/formerly [facility]) C53.9   • History of radiation therapy Z92.3   • Former smoker Z87.891   • Encounter for follow-up surveillance of cervical cancer Z08, Z85.41   • Generalized bloating R14.0   • Pap smear of vagina with previous hysterectomy for cancer Z08, Z90.710   • Hx of cervical cancer Z85.41   • Diarrhea R19.7   • Bloating R14.0   • Generalized abdominal pain R10.84   • Nausea and vomiting R11.2   • Obesity, unspecified obesity severity, unspecified obesity type E66.9   • Nonsmoker Z78.9   • Papanicolaou smear Z12.4   • Ear fullness, bilateral H93.8X3   • Periumbilical abdominal pain R10.33   • Hyperplastic colonic polyp K63.5       SURGERIES:  Past Surgical History:   Procedure Laterality Date   • APPENDECTOMY     • CERVICAL BIOPSY     •  SECTION     • CHOLECYSTECTOMY WITH INTRAOPERATIVE CHOLANGIOGRAM N/A 3/9/2018    Procedure: CHOLECYSTECTOMY LAPAROSCOPIC ;  Surgeon: Karla Lee MD;  Location: [facility] OR;  Service:    • COLONOSCOPY  2016    Hyperplastic polyp at 20 cm repeat exam in 5 years   • COLONOSCOPY N/A 2019    Procedure: COLONOSCOPY WITH ANESTHESIA;  Surgeon: Kiran Gonzalez MD;  Location: [facility] ENDOSCOPY;  Service: Gastroenterology   • CYSTOSCOPY      Diagnostic   • DILATATION AND CURETTAGE     • ENDOSCOPY N/A 2019    Procedure: ESOPHAGOGASTRODUODENOSCOPY WITH ANESTHESIA;  Surgeon: Kiran Gonzalez MD;  Location: [facility] ENDOSCOPY;  Service: Gastroenterology   • HYSTERECTOMY     • KNEE SURGERY     • OOPHORECTOMY     • PELVIC EXAMINATION UNDER ANESTHESIA     • TONSILLECTOMY     • TOTAL LAPAROSCOPIC HYSTERECTOMY WITH [employer]  MAINTENANCE ITEMS:  <no information>  Last Completed Colonoscopy       Status Date      COLONOSCOPY Done 2019 COLONOSCOPY     Patient has more history with this topic...          Last Completed Mammogram     Patient has no health maintenance due at this time          FAMILY HISTORY:  Family History   Problem Relation Age of Onset   • Skin cancer Mother    • Cancer Maternal Grandmother         Malignancy   • Cancer Paternal Grandmother         Malignancy   • Colon cancer Neg Hx    • Colon polyps Neg Hx        SOCIAL HISTORY:  Social History     Socioeconomic History   • Marital status:      Spouse name: Not on file   • Number of children: Not on file   • Years of education: Not on file   • Highest education level: Not on file   Tobacco Use   • Smoking status: Former Smoker     Packs/day: 0.50     Types: Cigarettes     Quit date: 2016     Years since quittin.0   • Smokeless tobacco: Never Used   Substance and Sexual Activity   • Alcohol use: No   • Drug use: No   • Sexual activity: Never       REVIEW OF SYSTEMS:  Review of Systems   Constitutional: Negative for activity change, appetite change, chills, diaphoresis, fatigue, fever and unexpected weight loss.   HENT: Negative for ear pain, nosebleeds, sinus pressure, sore throat and voice change.    Eyes: Negative for blurred vision, double vision, pain and visual disturbance.   Respiratory: Negative for cough and shortness of breath.    Cardiovascular: Negative for chest pain, palpitations and leg swelling.   Gastrointestinal: Positive for nausea. Negative for abdominal pain, anal bleeding, blood in stool, constipation, diarrhea and vomiting.   Endocrine: Negative for heat intolerance, polydipsia and polyuria.   Genitourinary: Positive for pelvic pain, pelvic pressure and vaginal pain. Negative for dysuria, frequency, hematuria, urgency and urinary incontinence.   Musculoskeletal: Negative for arthralgias and myalgias.   Skin: Negative for rash  "and skin lesions.   Neurological: Negative for dizziness, tremors, weakness and headache.   Hematological: Negative for adenopathy. Does not bruise/bleed easily.   Psychiatric/Behavioral: Negative for depressed mood. The patient is nervous/anxious.        /78   Pulse 76   Temp 97.3 °F (36.3 °C) (Temporal)   Resp 16   Ht 172.7 cm (68\")   Wt 94.3 kg (208 lb)   LMP  (LMP Unknown)   SpO2 98%   Breastfeeding No   BMI 31.63 kg/m²  Body surface area is 2.08 meters squared.  Pain Score    01/12/21 0826   PainSc:   5   PainLoc: Groin       Physical Exam:  Physical Exam  Constitutional:       Appearance: Normal appearance. She is well-developed.   HENT:      Head: Atraumatic.   Eyes:      General: No scleral icterus.     Pupils: Pupils are equal, round, and reactive to light.   Neck:      Musculoskeletal: Neck supple.      Trachea: Trachea normal.   Cardiovascular:      Rate and Rhythm: Normal rate and regular rhythm.      Heart sounds: No murmur. No friction rub. No gallop.    Pulmonary:      Effort: Pulmonary effort is normal.      Breath sounds: Normal breath sounds. No wheezing, rhonchi or rales.   Abdominal:      Palpations: Abdomen is soft.      Tenderness: There is abdominal tenderness in the left lower quadrant. There is no guarding.   Lymphadenopathy:      Cervical: No cervical adenopathy.      Upper Body:      Right upper body: No supraclavicular or axillary adenopathy.      Left upper body: No supraclavicular or axillary adenopathy.      Lower Body: No right inguinal adenopathy. No left inguinal adenopathy.   Neurological:      Mental Status: She is alert and oriented to person, place, and time.      Sensory: No sensory deficit.   Psychiatric:         Judgment: Judgment normal.         Yesi Bowman reports a pain score of   Pain Score    01/12/21 0826   PainSc:   5   PainLoc: Groin     .  Given her pain assessment as noted, treatment options were discussed and the following options were decided " upon as a follow-up plan to address the patient's pain: prescription for opiod analgesics., advised will give one script for tramadol til we determine the source of her pain.    Patient's Body mass index is 31.63 kg/m². BMI is above normal parameters. Recommendations include: defer to pcp.      LABS    Lab Results - Last 18 Months   Lab Units 01/12/21  0811 06/11/20  1357 10/10/19  1448   HEMOGLOBIN g/dL 12.8 12.1 12.4   HEMATOCRIT % 37.5 36.9 37.8   MCV fL 91.0 91.6 90.9   WBC 10*3/mm3 5.15 7.30 5.17   RDW % 13.2 13.8 12.5   MPV fL 9.1 9.6 9.5   PLATELETS 10*3/mm3 190 201 214   IMM GRAN % % 0.2 0.4 0.2   NEUTROS ABS 10*3/mm3 2.93 4.02 3.02   LYMPHS ABS 10*3/mm3 1.68 2.52 1.62   MONOS ABS 10*3/mm3 0.39 0.60 0.35   EOS ABS 10*3/mm3 0.10 0.09 0.14   BASOS ABS 10*3/mm3 0.04 0.04 0.03   IMMATURE GRANS (ABS) 10*3/mm3 0.01 0.03 0.01   NRBC /100 WBC 0.0 0.0 0.0       Lab Results - Last 18 Months   Lab Units 06/11/20  1357 10/10/19  1448   GLUCOSE mg/dL 87 78   SODIUM mmol/L 145 142   POTASSIUM mmol/L 4.0 3.8   CO2 mmol/L 27.0 28.0   CHLORIDE mmol/L 107 103   ANION GAP mmol/L 11.0 11.0   CREATININE mg/dL 0.74 0.60   BUN mg/dL 15 14   BUN / CREAT RATIO  20.3 23.3   CALCIUM mg/dL 9.9 9.4   EGFR IF NONAFRICN AM mL/min/1.73 84 108   ALK PHOS U/L 50 54   TOTAL PROTEIN g/dL 7.0 6.9   ALT (SGPT) U/L <5 9   AST (SGOT) U/L 10 13   BILIRUBIN mg/dL 0.3 0.2   ALBUMIN g/dL 4.60 4.10   GLOBULIN gm/dL 2.4 2.8     ASSESSMENT  1. Cervical Carcinoma diagnosed on 12/18/13  · Staging: AJCC TNM [id number], Stage IIIB  · Treatment: Radical Robotic hysterectomy and bilateral pelvic lymphadenopathy at [facility]. Adjuvant radiation therapy to equal 5040 cGy in 28 fractions concurrent with weekly Cisplatin.  · Disease status: No evidence of disease  2.  Chronic pain on trazodone .  3 . New onset of pelvic pain, vaginal pain 3 months ago.  Pain intermittent in left groin.  Vaginal canal very narrow per patient. LLQ tenderness on exam.   4.  GERD symptoms  controlled with Prilosec  5.  Intermittent nausea patient has Zofran at home to take as needed  6.  Hematologically stable with a normal CBC CMP today    PLAN  1.  Reviewed lab work with patient today and its results.  2.  Long discussion regarding the new onset of pelvic pain, vaginal pain etc. as stated above.  3.  Set up CT of the abdomen and pelvis with contrast for review  4.  Discussed with the patient that she will likely need a vaginal exam by GYN and she verbalized understanding  5.  Obtain urinalysis  6.  We will give 1 prescription for tramadol for her pelvic pain until etiology of pain is found.  eRx for tramadol 50 mg 1 every 6-8 hours sent to [facility] pharmacy.  #30 with no refills  7.  Encourage patient to continue following with primary care provider and other specialist  8.  Return to the office in 4 weeks for follow-up; No lab needed    I spent 25 total minutes, face-to-face, caring for Yesi today.  Greater than 50% of this time involved counseling and/or coordination of care as documented within this note regarding the patient's illness(es), pros and cons of various treatment options, instructions and/or risk reduction.    Vaishali Casey, APRN  01/12/2021  11:02 CST

## 2021-01-12 ENCOUNTER — OFFICE VISIT (OUTPATIENT)
Dept: ONCOLOGY | Facility: CLINIC | Age: 51
End: 2021-01-12

## 2021-01-12 ENCOUNTER — LAB (OUTPATIENT)
Dept: LAB | Facility: HOSPITAL | Age: 51
End: 2021-01-12

## 2021-01-12 ENCOUNTER — HOSPITAL ENCOUNTER (OUTPATIENT)
Dept: CT IMAGING | Facility: HOSPITAL | Age: 51
Discharge: HOME | End: 2021-01-12
Admitting: NURSE PRACTITIONER

## 2021-01-12 VITALS
TEMPERATURE: 97.3 F | HEART RATE: 76 BPM | HEIGHT: 68 IN | BODY MASS INDEX: 31.52 KG/M2 | DIASTOLIC BLOOD PRESSURE: 78 MMHG | OXYGEN SATURATION: 98 % | SYSTOLIC BLOOD PRESSURE: 154 MMHG | WEIGHT: 208 LBS | RESPIRATION RATE: 16 BRPM

## 2021-01-12 DIAGNOSIS — R10.2 PELVIC PAIN: ICD-10-CM

## 2021-01-12 DIAGNOSIS — R11.0 NAUSEA: ICD-10-CM

## 2021-01-12 DIAGNOSIS — C53.9 MALIGNANT NEOPLASM OF CERVIX, UNSPECIFIED SITE (HCC): Primary | ICD-10-CM

## 2021-01-12 DIAGNOSIS — C53.9 MALIGNANT NEOPLASM OF CERVIX, UNSPECIFIED SITE (HCC): ICD-10-CM

## 2021-01-12 LAB
ALBUMIN SERPL-MCNC: 4.5 G/DL (ref 3.5–5.2)
ALBUMIN/GLOB SERPL: 1.7 G/DL
ALP SERPL-CCNC: 67 U/L (ref 39–117)
ALT SERPL W P-5'-P-CCNC: 10 U/L (ref 1–33)
ANION GAP SERPL CALCULATED.3IONS-SCNC: 7 MMOL/L (ref 5–15)
AST SERPL-CCNC: 14 U/L (ref 1–32)
BACTERIA UR QL AUTO: ABNORMAL /HPF
BASOPHILS # BLD AUTO: 0.04 10*3/MM3 (ref 0–0.2)
BASOPHILS NFR BLD AUTO: 0.8 % (ref 0–1.5)
BILIRUB SERPL-MCNC: 0.2 MG/DL (ref 0–1.2)
BILIRUB UR QL STRIP: NEGATIVE
BUN SERPL-MCNC: 16 MG/DL (ref 6–20)
BUN/CREAT SERPL: 23.5 (ref 7–25)
CALCIUM SPEC-SCNC: 9.9 MG/DL (ref 8.6–10.5)
CHLORIDE SERPL-SCNC: 103 MMOL/L (ref 98–107)
CLARITY UR: CLEAR
CO2 SERPL-SCNC: 28 MMOL/L (ref 22–29)
COLOR UR: YELLOW
CREAT SERPL-MCNC: 0.68 MG/DL (ref 0.57–1)
DEPRECATED RDW RBC AUTO: 44.9 FL (ref 37–54)
EOSINOPHIL # BLD AUTO: 0.1 10*3/MM3 (ref 0–0.4)
EOSINOPHIL NFR BLD AUTO: 1.9 % (ref 0.3–6.2)
ERYTHROCYTE [DISTWIDTH] IN BLOOD BY AUTOMATED COUNT: 13.2 % (ref 12.3–15.4)
GFR SERPL CREATININE-BSD FRML MDRD: 92 ML/MIN/1.73
GLOBULIN UR ELPH-MCNC: 2.7 GM/DL
GLUCOSE SERPL-MCNC: 86 MG/DL (ref 65–99)
GLUCOSE UR STRIP-MCNC: NEGATIVE MG/DL
HCT VFR BLD AUTO: 37.5 % (ref 34–46.6)
HGB BLD-MCNC: 12.8 G/DL (ref 12–15.9)
HGB UR QL STRIP.AUTO: NEGATIVE
HOLD SPECIMEN: NORMAL
HYALINE CASTS UR QL AUTO: ABNORMAL /LPF
IMM GRANULOCYTES # BLD AUTO: 0.01 10*3/MM3 (ref 0–0.05)
IMM GRANULOCYTES NFR BLD AUTO: 0.2 % (ref 0–0.5)
KETONES UR QL STRIP: NEGATIVE
LEUKOCYTE ESTERASE UR QL STRIP.AUTO: NEGATIVE
LYMPHOCYTES # BLD AUTO: 1.68 10*3/MM3 (ref 0.7–3.1)
LYMPHOCYTES NFR BLD AUTO: 32.6 % (ref 19.6–45.3)
MCH RBC QN AUTO: 31.1 PG (ref 26.6–33)
MCHC RBC AUTO-ENTMCNC: 34.1 G/DL (ref 31.5–35.7)
MCV RBC AUTO: 91 FL (ref 79–97)
MONOCYTES # BLD AUTO: 0.39 10*3/MM3 (ref 0.1–0.9)
MONOCYTES NFR BLD AUTO: 7.6 % (ref 5–12)
NEUTROPHILS NFR BLD AUTO: 2.93 10*3/MM3 (ref 1.7–7)
NEUTROPHILS NFR BLD AUTO: 56.9 % (ref 42.7–76)
NITRITE UR QL STRIP: NEGATIVE
NRBC BLD AUTO-RTO: 0 /100 WBC (ref 0–0.2)
PH UR STRIP.AUTO: <=5 [PH] (ref 5–8)
PLATELET # BLD AUTO: 190 10*3/MM3 (ref 140–450)
PMV BLD AUTO: 9.1 FL (ref 6–12)
POTASSIUM SERPL-SCNC: 4.1 MMOL/L (ref 3.5–5.2)
PROT SERPL-MCNC: 7.2 G/DL (ref 6–8.5)
PROT UR QL STRIP: NEGATIVE
RBC # BLD AUTO: 4.12 10*6/MM3 (ref 3.77–5.28)
RBC # UR: ABNORMAL /HPF
REF LAB TEST METHOD: ABNORMAL
SODIUM SERPL-SCNC: 138 MMOL/L (ref 136–145)
SP GR UR STRIP: 1.01 (ref 1–1.03)
SQUAMOUS #/AREA URNS HPF: ABNORMAL /HPF
UROBILINOGEN UR QL STRIP: NORMAL
WBC # BLD AUTO: 5.15 10*3/MM3 (ref 3.4–10.8)
WBC UR QL AUTO: ABNORMAL /HPF

## 2021-01-12 PROCEDURE — 25010000002 IOPAMIDOL 61 % SOLUTION: Performed by: NURSE PRACTITIONER

## 2021-01-12 PROCEDURE — 85025 COMPLETE CBC W/AUTO DIFF WBC: CPT

## 2021-01-12 PROCEDURE — 74177 CT ABD & PELVIS W/CONTRAST: CPT

## 2021-01-12 PROCEDURE — 99214 OFFICE O/P EST MOD 30 MIN: CPT | Performed by: NURSE PRACTITIONER

## 2021-01-12 PROCEDURE — 80053 COMPREHEN METABOLIC PANEL: CPT

## 2021-01-12 PROCEDURE — 81001 URINALYSIS AUTO W/SCOPE: CPT

## 2021-01-12 PROCEDURE — 36415 COLL VENOUS BLD VENIPUNCTURE: CPT

## 2021-01-12 RX ORDER — TRAMADOL HYDROCHLORIDE 50 MG/1
50 TABLET ORAL EVERY 6 HOURS PRN
Qty: 30 TABLET | Refills: 0 | Status: SHIPPED | OUTPATIENT
Start: 2021-01-12 | End: 2023-03-09

## 2021-01-12 RX ADMIN — IOPAMIDOL 100 ML: 612 INJECTION, SOLUTION INTRAVENOUS at 14:32

## 2021-01-13 DIAGNOSIS — R10.2 PELVIC PAIN: ICD-10-CM

## 2021-01-13 DIAGNOSIS — R10.2 VAGINAL PAIN: ICD-10-CM

## 2021-01-13 DIAGNOSIS — C53.9 MALIGNANT NEOPLASM OF CERVIX, UNSPECIFIED SITE (HCC): Primary | ICD-10-CM

## 2021-02-03 ENCOUNTER — OFFICE VISIT (OUTPATIENT)
Dept: OBSTETRICS AND GYNECOLOGY | Facility: CLINIC | Age: 51
End: 2021-02-03

## 2021-02-03 VITALS
HEIGHT: 68 IN | WEIGHT: 201 LBS | SYSTOLIC BLOOD PRESSURE: 122 MMHG | DIASTOLIC BLOOD PRESSURE: 78 MMHG | BODY MASS INDEX: 30.46 KG/M2

## 2021-02-03 DIAGNOSIS — Z92.3 HISTORY OF RADIATION THERAPY: ICD-10-CM

## 2021-02-03 DIAGNOSIS — Z78.9 NON-SMOKER: ICD-10-CM

## 2021-02-03 DIAGNOSIS — R10.84 GENERALIZED ABDOMINAL PAIN: ICD-10-CM

## 2021-02-03 DIAGNOSIS — C53.9 MALIGNANT NEOPLASM OF CERVIX, UNSPECIFIED SITE (HCC): ICD-10-CM

## 2021-02-03 DIAGNOSIS — R10.2 PELVIC PAIN: Primary | ICD-10-CM

## 2021-02-03 PROCEDURE — 99203 OFFICE O/P NEW LOW 30 MIN: CPT | Performed by: OBSTETRICS & GYNECOLOGY

## 2021-02-03 NOTE — PROGRESS NOTES
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   Constitutional:       Appearance: Normal appearance.   HENT:      Head: Normocephalic and atraumatic.   Neck:      Musculoskeletal: Normal range of motion and neck supple.   Abdominal:      General: Abdomen is flat. Bowel sounds are normal.      Palpations: Abdomen is soft.   Musculoskeletal: Normal range of motion.   Skin:     General: Skin is warm and dry.   Neurological:      General: No focal deficit present.      Mental Status: She is alert and oriented to person, place, and time. Mental status is at baseline.   Psychiatric:         Mood and Affect: Mood normal.         Behavior: Behavior normal.         Thought Content: Thought content normal.         Judgment: Judgment normal.        Result Review :   The following data was reviewed by: Tony Kendrick MD on 02/03/2021:  Common labs    Common Labsle 6/11/20 6/11/20 1/12/21 1/12/21    1357 1357 0811 0811   BUN  15  16   Creatinine  0.74  0.68   eGFR Non African Am  84  92   Sodium  145  138   Potassium  4.0  4.1   Chloride  107  103 "   Calcium  9.9  9.9   Albumin  4.60  4.50   Total Bilirubin  0.3  0.2   Alkaline Phosphatase  50  67   AST (SGOT)  10  14   ALT (SGPT)  <5  10   WBC 7.30  5.15    Hemoglobin 12.1  12.8    Hematocrit 36.9  37.5    Platelets 201  190       Comments are available for some flowsheets but are not being displayed.           Data reviewed: Radiologic studies CT scan          Assessment and Plan    Problem List Items Addressed This Visit        Other    Cervical cancer (CMS/HCC)    History of radiation therapy    Overview     5040 cGy to cervix + 3000 cGy (HDRs x 5)         Generalized abdominal pain      Other Visit Diagnoses     Pelvic pain    -  Primary    Non-smoker              Follow Up   Return in about 2 weeks (around 2/17/2021).  Patient was given instructions and counseling regarding her condition or for health maintenance advice. Please see specific information pulled into the AVS if appropriate.   Screen for interstitial cystitis with puff questionnaire and online research  Return office in 2 weeks for discussion of results  Surgical approach in this patient is not an option at this time given the lack of bowel symptoms or severity of pain    Tony Kendrick MD

## 2021-02-04 DIAGNOSIS — C53.9 MALIGNANT NEOPLASM OF CERVIX, UNSPECIFIED SITE (HCC): Primary | ICD-10-CM

## 2021-02-09 ENCOUNTER — APPOINTMENT (OUTPATIENT)
Dept: LAB | Facility: HOSPITAL | Age: 51
End: 2021-02-09

## 2021-02-10 ENCOUNTER — APPOINTMENT (OUTPATIENT)
Dept: LAB | Facility: HOSPITAL | Age: 51
End: 2021-02-10

## 2021-02-11 DIAGNOSIS — R10.2 PELVIC PAIN: ICD-10-CM

## 2021-02-12 RX ORDER — TRAMADOL HYDROCHLORIDE 50 MG/1
TABLET ORAL
Qty: 30 TABLET | Refills: 0 | OUTPATIENT
Start: 2021-02-12

## 2021-03-01 ENCOUNTER — APPOINTMENT (OUTPATIENT)
Dept: LAB | Facility: HOSPITAL | Age: 51
End: 2021-03-01

## 2021-03-01 ENCOUNTER — TELEPHONE (OUTPATIENT)
Dept: ONCOLOGY | Facility: CLINIC | Age: 51
End: 2021-03-01

## 2021-03-01 NOTE — TELEPHONE ENCOUNTER
Caller: Yesi Bowman    Relationship to patient: self    Best call back number: [phone]    Type of visit: follow up    Additional notes: Pt cancelled today's appt. She needs to take her mother to the doctor.  She will call back later to reschedule.

## 2021-04-05 NOTE — TELEPHONE ENCOUNTER
Former pt of Dr. López     Pending apt in 1m     Last creatinine abnormal at 0.30     Pls advise    Spoke w/pt and made appt with Karrie/lab 1/12/21      ----- Message from Bacilio Ramirez CMA sent at 1/7/2021  8:54 AM CST -----  No f/u appt  on chart   ----- Message -----  From: SYSTEM  Sent: 1/6/2021  11:23 PM CST  To: [facility]

## 2023-02-27 ENCOUNTER — TELEPHONE (OUTPATIENT)
Dept: ONCOLOGY | Facility: CLINIC | Age: 53
End: 2023-02-27
Payer: COMMERCIAL

## 2023-02-27 NOTE — TELEPHONE ENCOUNTER
CALLED TO SCHEDULE PT SOONER PER [facility], [facility] HAS AN OPENING THIS THURS 3/2 OR NEXT THURS 3/9. MAILBOX FULL UNABLE TO LEAVE A MESSAGE.

## 2023-03-01 ENCOUNTER — TRANSCRIBE ORDERS (OUTPATIENT)
Dept: ADMINISTRATIVE | Facility: HOSPITAL | Age: 53
End: 2023-03-01
Payer: COMMERCIAL

## 2023-03-01 DIAGNOSIS — R59.9 SWELLING OF LYMPH NODES: ICD-10-CM

## 2023-03-01 DIAGNOSIS — Z85.41 HISTORY OF CERVICAL CANCER: Primary | ICD-10-CM

## 2023-03-01 DIAGNOSIS — R63.4 LOSS OF WEIGHT: ICD-10-CM

## 2023-03-02 ENCOUNTER — TRANSCRIBE ORDERS (OUTPATIENT)
Dept: ADMINISTRATIVE | Facility: HOSPITAL | Age: 53
End: 2023-03-02
Payer: COMMERCIAL

## 2023-03-02 DIAGNOSIS — R93.5 ABNORMAL COMPUTED TOMOGRAPHY OF PELVIS: ICD-10-CM

## 2023-03-02 DIAGNOSIS — Z85.41 HISTORY OF CERVICAL CANCER: ICD-10-CM

## 2023-03-02 DIAGNOSIS — R63.4 UNINTENTIONAL WEIGHT LOSS: ICD-10-CM

## 2023-03-02 DIAGNOSIS — R59.9 ENLARGED LYMPH NODES, UNSPECIFIED: Primary | ICD-10-CM

## 2023-03-06 ENCOUNTER — HOSPITAL ENCOUNTER (OUTPATIENT)
Dept: CT IMAGING | Facility: HOSPITAL | Age: 53
Discharge: HOME | End: 2023-03-06
Payer: COMMERCIAL

## 2023-03-06 ENCOUNTER — APPOINTMENT (OUTPATIENT)
Dept: OTHER | Facility: HOSPITAL | Age: 53
End: 2023-03-06
Payer: COMMERCIAL

## 2023-03-06 DIAGNOSIS — C53.9 MALIGNANT NEOPLASM OF CERVIX, UNSPECIFIED SITE: Primary | ICD-10-CM

## 2023-03-06 DIAGNOSIS — Z00.6 ENCOUNTER FOR EXAMINATION FOR NORMAL COMPARISON AND CONTROL IN CLINICAL RESEARCH PROGRAM: ICD-10-CM

## 2023-03-06 PROCEDURE — A9552 F18 FDG: HCPCS | Performed by: INTERNAL MEDICINE

## 2023-03-06 PROCEDURE — 0 FLUDEOXYGLUCOSE F18 SOLUTION: Performed by: INTERNAL MEDICINE

## 2023-03-06 PROCEDURE — 78815 PET IMAGE W/CT SKULL-THIGH: CPT

## 2023-03-06 RX ADMIN — FLUDEOXYGLUCOSE F18 1 DOSE: 300 INJECTION INTRAVENOUS at 10:36

## 2023-03-09 ENCOUNTER — LAB (OUTPATIENT)
Dept: LAB | Facility: HOSPITAL | Age: 53
End: 2023-03-09
Payer: COMMERCIAL

## 2023-03-09 ENCOUNTER — OFFICE VISIT (OUTPATIENT)
Dept: ONCOLOGY | Facility: CLINIC | Age: 53
End: 2023-03-09
Payer: COMMERCIAL

## 2023-03-09 VITALS
DIASTOLIC BLOOD PRESSURE: 78 MMHG | WEIGHT: 150.4 LBS | RESPIRATION RATE: 16 BRPM | SYSTOLIC BLOOD PRESSURE: 118 MMHG | OXYGEN SATURATION: 99 % | HEIGHT: 68 IN | TEMPERATURE: 97.1 F | HEART RATE: 60 BPM | BODY MASS INDEX: 22.79 KG/M2

## 2023-03-09 DIAGNOSIS — F32.A DEPRESSION, UNSPECIFIED DEPRESSION TYPE: ICD-10-CM

## 2023-03-09 DIAGNOSIS — R63.4 UNINTENTIONAL WEIGHT LOSS: ICD-10-CM

## 2023-03-09 DIAGNOSIS — C53.9 MALIGNANT NEOPLASM OF CERVIX, UNSPECIFIED SITE: Primary | ICD-10-CM

## 2023-03-09 DIAGNOSIS — Z12.39 SCREENING BREAST EXAMINATION: ICD-10-CM

## 2023-03-09 DIAGNOSIS — Z85.41 HISTORY OF CERVICAL CANCER: Primary | ICD-10-CM

## 2023-03-09 LAB
ALBUMIN SERPL-MCNC: 4.8 G/DL (ref 3.5–5.2)
ALBUMIN/GLOB SERPL: 1.8 G/DL
ALP SERPL-CCNC: 58 U/L (ref 39–117)
ALT SERPL W P-5'-P-CCNC: 7 U/L (ref 1–33)
ANION GAP SERPL CALCULATED.3IONS-SCNC: 9 MMOL/L (ref 5–15)
AST SERPL-CCNC: 12 U/L (ref 1–32)
BASOPHILS # BLD AUTO: 0.03 10*3/MM3 (ref 0–0.2)
BASOPHILS NFR BLD AUTO: 0.5 % (ref 0–1.5)
BILIRUB SERPL-MCNC: 0.3 MG/DL (ref 0–1.2)
BUN SERPL-MCNC: 7 MG/DL (ref 6–20)
BUN/CREAT SERPL: 10.1 (ref 7–25)
CALCIUM SPEC-SCNC: 10 MG/DL (ref 8.6–10.5)
CHLORIDE SERPL-SCNC: 104 MMOL/L (ref 98–107)
CO2 SERPL-SCNC: 29 MMOL/L (ref 22–29)
CREAT SERPL-MCNC: 0.69 MG/DL (ref 0.57–1)
DEPRECATED RDW RBC AUTO: 46.4 FL (ref 37–54)
EGFRCR SERPLBLD CKD-EPI 2021: 105.9 ML/MIN/1.73
EOSINOPHIL # BLD AUTO: 0.14 10*3/MM3 (ref 0–0.4)
EOSINOPHIL NFR BLD AUTO: 2.6 % (ref 0.3–6.2)
ERYTHROCYTE [DISTWIDTH] IN BLOOD BY AUTOMATED COUNT: 13.2 % (ref 12.3–15.4)
FOLATE SERPL-MCNC: 9.57 NG/ML (ref 4.78–24.2)
GLOBULIN UR ELPH-MCNC: 2.6 GM/DL
GLUCOSE SERPL-MCNC: 132 MG/DL (ref 65–99)
HCT VFR BLD AUTO: 43 % (ref 34–46.6)
HGB BLD-MCNC: 13.5 G/DL (ref 12–15.9)
HIV1+2 AB SER QL: NORMAL
HOLD SPECIMEN: NORMAL
IMM GRANULOCYTES # BLD AUTO: 0.01 10*3/MM3 (ref 0–0.05)
IMM GRANULOCYTES NFR BLD AUTO: 0.2 % (ref 0–0.5)
LYMPHOCYTES # BLD AUTO: 1.74 10*3/MM3 (ref 0.7–3.1)
LYMPHOCYTES NFR BLD AUTO: 31.8 % (ref 19.6–45.3)
MCH RBC QN AUTO: 29.7 PG (ref 26.6–33)
MCHC RBC AUTO-ENTMCNC: 31.4 G/DL (ref 31.5–35.7)
MCV RBC AUTO: 94.5 FL (ref 79–97)
MONOCYTES # BLD AUTO: 0.37 10*3/MM3 (ref 0.1–0.9)
MONOCYTES NFR BLD AUTO: 6.8 % (ref 5–12)
NEUTROPHILS NFR BLD AUTO: 3.18 10*3/MM3 (ref 1.7–7)
NEUTROPHILS NFR BLD AUTO: 58.1 % (ref 42.7–76)
NRBC BLD AUTO-RTO: 0 /100 WBC (ref 0–0.2)
PLATELET # BLD AUTO: 219 10*3/MM3 (ref 140–450)
PMV BLD AUTO: 9.5 FL (ref 6–12)
POTASSIUM SERPL-SCNC: 4.1 MMOL/L (ref 3.5–5.2)
PROT SERPL-MCNC: 7.4 G/DL (ref 6–8.5)
RBC # BLD AUTO: 4.55 10*6/MM3 (ref 3.77–5.28)
SODIUM SERPL-SCNC: 142 MMOL/L (ref 136–145)
T4 FREE SERPL-MCNC: 1.08 NG/DL (ref 0.93–1.7)
TSH SERPL DL<=0.05 MIU/L-ACNC: 0.59 UIU/ML (ref 0.27–4.2)
VIT B12 BLD-MCNC: 340 PG/ML (ref 211–946)
WBC NRBC COR # BLD: 5.47 10*3/MM3 (ref 3.4–10.8)

## 2023-03-09 PROCEDURE — G0432 EIA HIV-1/HIV-2 SCREEN: HCPCS

## 2023-03-09 PROCEDURE — 36415 COLL VENOUS BLD VENIPUNCTURE: CPT

## 2023-03-09 PROCEDURE — 82746 ASSAY OF FOLIC ACID SERUM: CPT

## 2023-03-09 PROCEDURE — 84439 ASSAY OF FREE THYROXINE: CPT

## 2023-03-09 PROCEDURE — 80050 GENERAL HEALTH PANEL: CPT

## 2023-03-09 PROCEDURE — 99214 OFFICE O/P EST MOD 30 MIN: CPT | Performed by: NURSE PRACTITIONER

## 2023-03-09 PROCEDURE — 82607 VITAMIN B-12: CPT

## 2023-03-09 NOTE — PROGRESS NOTES
[facility] HEMATOLOGY & ONCOLOGY [place]  [address]  [facility] [address] [phone]    Patient Name: Yesi Bowman  Encounter Date: 03/09/2023   YOB: 1970  Patient Number: [id number]    Hematology / Oncology Progress Note      HPI / REASON FOR VISIT: Yesi Bowman is a 52 y.o. female who is followed by this office for history of cervical cancer.  She was admitted in 2013 to the hospital after having profuse vaginal bleeding.  She was evaluated by Dr. Hernandez on 2/18/2013 was found to have a large necrotic mass in the cervix that was consistent with invasive Endo cervical adenocarcinoma, well-differentiated without lymphovascular and perineural invasion.  CAT scan of the abdomen pelvis was performed again showing a large necrotic appearing mass at the cervix measuring 7.2 cm.  CAT scan of the chest that showed no evidence of disease.       She was then referred to the [facility], gynecologic oncology service on 12/19/2013.  Patient underwent a repeat biopsy that confirmed the histology.  She then underwent a PET scan on 12/20/2013 finding diffusely enlarged cervix with no findings of local regional metastatic disease.  She also underwent a colonoscopy in January 2014 that found no evidence of high-grade dysplasia.       She then underwent a radical robotic hysterectomy and bilateral pelvic lymphadenectomy and cystoscopy on 2/11/2014.  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  The tumor was well-differentiated.  It measured 4 x 3.5 x 2 cm and stromal invasion was involving more than half the wall thickness.  Angiolymphatic invasion was present.  Vaginal cuff margin was focally positive.  1 of 5 left pelvic lymph nodes was also positive.  1 of 9 right pelvic lymph nodes were positive.  Her AJCC TNM stage was a [id number], stage IIIb disease.  She then proceeded with concurrent  "chemoradiation with weekly cisplatin and radiation on 4/2/2014.  She underwent internal radiation therapy as well as hyperbaric treatments for healing.    INTERVAL HISTORY   Ms. Bowman presents to clinic today for continued follow up. Her last visit to this office was 01/12/21.  AT that visit she complained of pelvic pain. She had CT on 01/12/21 which showed 1. No acute intra-abdominal finding. No evidence of disease recurrence or progression.    She reports that since she had cholcystectoy, she's had increased nausea vomiting, diarrhea.  Early satiety, drinks ensure. \"I have no interest in food\".      PET Scan 03/06/23 No abnormal hypermetabolic activity in the neck, chest, abdomen, or pelvis.  She has lost approx 38 pounds in 6 months.    Denies fever, night sweats,.   She is scheduled to see Dr. 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        LABS    Lab Results - Last 18 Months   Lab Units 03/09/23  0839   HEMOGLOBIN g/dL 13.5   HEMATOCRIT % 43.0   MCV fL 94.5   WBC 10*3/mm3 5.47   RDW % 13.2   MPV fL 9.5   PLATELETS 10*3/mm3 219   IMM GRAN % % 0.2   NEUTROS ABS 10*3/mm3 3.18   LYMPHS ABS 10*3/mm3 1.74   MONOS ABS 10*3/mm3 0.37   EOS ABS 10*3/mm3 0.14   BASOS ABS 10*3/mm3 0.03   IMMATURE GRANS (ABS) 10*3/mm3 0.01   NRBC /100 WBC 0.0       Lab Results - Last 18 Months   Lab Units 03/09/23  0839   GLUCOSE mg/dL 132*   SODIUM mmol/L 142   POTASSIUM mmol/L 4.1   CO2 mmol/L 29.0   CHLORIDE mmol/L 104   ANION GAP mmol/L 9.0   CREATININE mg/dL 0.69   BUN mg/dL 7   BUN / CREAT RATIO  10.1   CALCIUM mg/dL 10.0   ALK PHOS U/L 58   TOTAL PROTEIN g/dL 7.4   ALT (SGPT) U/L 7   AST (SGOT) U/L 12   BILIRUBIN mg/dL 0.3   ALBUMIN g/dL 4.8   GLOBULIN gm/dL 2.6       No results for input(s): MSPIKE, KAPPALAMB, IGLFLC, URICACID, FREEKAPPAL, CEA, LDH, REFLABREPO in the last 47447 hours.    No results for input(s): IRON, TIBC, LABIRON, FERRITIN, [id number], TSH, FOLATE in the last 78399 hours.    Invalid input(s): " VITB12      PAST MEDICAL HISTORY:  ALLERGIES:  No Known Allergies  CURRENT MEDICATIONS:  Outpatient Encounter Medications as of 3/9/2023   Medication Sig Dispense Refill   • cholecalciferol (VITAMIN D3) 1000 units tablet Take 1 tablet by mouth Daily.     • omeprazole (priLOSEC) 40 MG capsule TAKE 1 CAPSULE BY MOUTH DAILY. 90 capsule 0   • ondansetron ODT (ZOFRAN ODT) 4 MG disintegrating tablet Take 1 tablet by mouth Every 8 (Eight) Hours As Needed for Nausea. 20 tablet 0   • sertraline (ZOLOFT) 100 MG tablet Take 1 tablet by mouth Daily.     • traZODone (DESYREL) 150 MG tablet Take 50 mg by mouth Every Night.     • [DISCONTINUED] Omega-3 Fatty Acids (FISH OIL) 1000 MG capsule capsule Take 1 capsule by mouth Daily With Breakfast.     • [DISCONTINUED] traMADol (ULTRAM) 50 MG tablet Take 1 tablet by mouth Every 6 (Six) Hours As Needed for Moderate Pain . 30 tablet 0     No facility-administered encounter medications on file as of 3/9/2023.     ADULT ILLNESSES:  Patient Active Problem List   Diagnosis Code   • Cervical cancer (HCC) C53.9   • History of radiation therapy Z92.3   • Former smoker Z87.891   • Encounter for follow-up surveillance of cervical cancer Z08, Z85.41   • Generalized bloating R14.0   • Pap smear of vagina with previous hysterectomy for cancer Z08, Z90.710   • Hx of cervical cancer Z85.41   • Diarrhea R19.7   • Bloating R14.0   • Generalized abdominal pain R10.84   • Nausea and vomiting R11.2   • Obesity, unspecified obesity severity, unspecified obesity type E66.9   • Nonsmoker Z78.9   • Papanicolaou smear Z12.4   • Ear fullness, bilateral H93.8X3   • Periumbilical abdominal pain R10.33   • Hyperplastic colonic polyp K63.5     SURGERIES:  Past Surgical History:   Procedure Laterality Date   • APPENDECTOMY     • CERVICAL BIOPSY     •  SECTION     • CHOLECYSTECTOMY WITH INTRAOPERATIVE CHOLANGIOGRAM N/A 3/9/2018    Procedure: CHOLECYSTECTOMY LAPAROSCOPIC ;  Surgeon: Karla Lee MD;   Location: [facility] OR;  Service:    • COLONOSCOPY  2016    Hyperplastic polyp at 20 cm repeat exam in 5 years   • COLONOSCOPY N/A 2019    Procedure: COLONOSCOPY WITH ANESTHESIA;  Surgeon: Kiran Gonzalez MD;  Location: [facility] ENDOSCOPY;  Service: Gastroenterology   • CYSTOSCOPY      Diagnostic   • DILATATION AND CURETTAGE     • ENDOSCOPY N/A 2019    Procedure: ESOPHAGOGASTRODUODENOSCOPY WITH ANESTHESIA;  Surgeon: Kiran Gonzalez MD;  Location: [facility] ENDOSCOPY;  Service: Gastroenterology   • HYSTERECTOMY     • KNEE SURGERY     • OOPHORECTOMY     • PELVIC EXAMINATION UNDER ANESTHESIA     • TONSILLECTOMY     • TOTAL LAPAROSCOPIC HYSTERECTOMY WITH DAVINCI ROBOT       HEALTH MAINTENANCE ITEMS:  Health Maintenance Due   Topic Date Due   • TDAP/TD VACCINES (1 - Tdap) Never done   • HEPATITIS C SCREENING  Never done   • ANNUAL PHYSICAL  Never done   • MAMMOGRAM  2018   • COVID-19 Vaccine (2 - Moderna series) 2021   • INFLUENZA VACCINE  Never done   • ZOSTER VACCINE (1 of 2) Never done       <no information>  Last Completed Colonoscopy          COLORECTAL CANCER SCREENING (COLONOSCOPY - Every 5 Years) Next due on 2024  COLONOSCOPY    2019  Surgical Procedure: COLONOSCOPY    2016  SCANNED - COLONOSCOPY                There is no immunization history on file for this patient.  Last Completed Mammogram     This patient has no relevant Health Maintenance data.            FAMILY HISTORY:  Family History   Problem Relation Age of Onset   • Skin cancer Mother    • Cancer Maternal Grandmother         Malignancy   • Cancer Paternal Grandmother         Malignancy   • Colon cancer Neg Hx    • Colon polyps Neg Hx      SOCIAL HISTORY:  Social History     Socioeconomic History   • Marital status:    Tobacco Use   • Smoking status: Some Days     Packs/day: 0.50     Types: Cigarettes     Last attempt to quit: 2016     Years since quittin.2   • Smokeless  "tobacco: Never   Vaping Use   • Vaping Use: Never used   Substance and Sexual Activity   • Alcohol use: No   • Drug use: No   • Sexual activity: Not Currently     Partners: Male     Birth control/protection: Surgical     Comment: Hysterectomy       REVIEW OF SYSTEMS:  Review of Systems   Constitutional: Positive for fatigue and unexpected weight loss. Negative for fever.   HENT: Negative for trouble swallowing.    Respiratory: Negative for cough and shortness of breath.    Cardiovascular: Negative for chest pain, palpitations and leg swelling.   Gastrointestinal: Positive for diarrhea, nausea and vomiting.   Genitourinary: Negative for hematuria.   Musculoskeletal: Negative for arthralgias and myalgias.   Skin: Negative for rash, skin lesions and wound.   Neurological: Negative for dizziness, syncope, memory problem and confusion.   Psychiatric/Behavioral: Negative for suicidal ideas and depressed mood. The patient is not nervous/anxious.        /78   Pulse 60   Temp 97.1 °F (36.2 °C)   Resp 16   Ht 172.7 cm (68\")   Wt 68.2 kg (150 lb 6.4 oz)   LMP  (LMP Unknown)   SpO2 99%   BMI 22.87 kg/m²  Body surface area is 1.81 meters squared.    Pain Score    03/09/23 0852   PainSc: 0-No pain       Physical Exam:  Physical Exam  Constitutional:       Appearance: Normal appearance.   HENT:      Head: Normocephalic and atraumatic.   Cardiovascular:      Rate and Rhythm: Normal rate and regular rhythm.   Pulmonary:      Effort: Pulmonary effort is normal.      Breath sounds: Normal breath sounds.   Abdominal:      General: Bowel sounds are normal.      Palpations: Abdomen is soft.   Musculoskeletal:      Right lower leg: No edema.      Left lower leg: No edema.   Skin:     General: Skin is warm and dry.   Neurological:      Mental Status: She is alert and oriented to person, place, and time.   Psychiatric:         Attention and Perception: Attention normal.         Mood and Affect: Mood normal.         Judgment: " Judgment normal.         Yesi Bowman reports a pain score of 0.  Given her pain assessment as noted, treatment options were discussed and the following options were decided upon as a follow-up plan to address the patient's pain: No intervention indicated .           ASSESSMENT / PLAN    1. History of cervical cancer    2. Unintentional weight loss    3. Screening breast examination         ASSESSMENT:      1. History Cervical Carcinoma diagnosed on 12/18/13  ? Staging: AJCC TNM [id number], Stage IIIB  ? Treatment: Radical Robotic hysterectomy and bilateral pelvic lymphadenopathy at [facility]. Adjuvant radiation therapy to equal 5040 cGy in 28 fractions concurrent with weekly Cisplatin.  ? Disease status: No evidence of disease  -PET Scan 03/06/23 No abnormal hypermetabolic activity in the neck, chest, abdomen, or pelvis.    Smokes 2-3 cigarettes per day more on the weekend    2. Unintentional Weight loss.    -Will order HIV, TSH, T4, B12 folate    3.  Depression   -PHQ9 today is 9  -Pt is taking Zoloft 100 mg daily   -Managed per PCP       She hadn't had mammogram since 2016.  We will order that today.     PLAN:    Stable for observation.    Return to office 1 year   Patient will have preoffice labs CBC, CMP  Continue current medications, treatment plans and follow up with PCP and any other providers.  Care discussed with patient.  Understanding expressed.  Patient agreeable with plan.        Annabella Kevin, MICHELE  03/09/2023

## 2023-03-14 ENCOUNTER — OFFICE VISIT (OUTPATIENT)
Dept: GASTROENTEROLOGY | Facility: CLINIC | Age: 53
End: 2023-03-14
Payer: COMMERCIAL

## 2023-03-14 VITALS
TEMPERATURE: 97.5 F | BODY MASS INDEX: 23.04 KG/M2 | DIASTOLIC BLOOD PRESSURE: 72 MMHG | OXYGEN SATURATION: 96 % | HEIGHT: 68 IN | SYSTOLIC BLOOD PRESSURE: 132 MMHG | WEIGHT: 152 LBS | HEART RATE: 85 BPM

## 2023-03-14 DIAGNOSIS — R11.0 NAUSEA: Primary | ICD-10-CM

## 2023-03-14 DIAGNOSIS — R10.13 DYSPEPSIA: ICD-10-CM

## 2023-03-14 DIAGNOSIS — Z71.6 TOBACCO ABUSE COUNSELING: ICD-10-CM

## 2023-03-14 PROCEDURE — 99214 OFFICE O/P EST MOD 30 MIN: CPT | Performed by: CLINICAL NURSE SPECIALIST

## 2023-03-14 RX ORDER — PANTOPRAZOLE SODIUM 40 MG/1
40 TABLET, DELAYED RELEASE ORAL DAILY
COMMUNITY

## 2023-03-14 NOTE — H&P (VIEW-ONLY)
Yesi Bowman  1970    3/14/2023  Chief Complaint   Patient presents with   • GI Problem     Nausea     Subjective   HPI  Yesi Bowman is a 52 y.o. female who presents with a complaint of dyspepsia burping eggs and belching. She has had some nausea as well. She has been taking ENSURE. She says that she is stressed due to her being a cancer survivor, she cant eat or tolerate foods.  She is taking zofran for the nausea as well as Pantoprazole 40 mg daily. She has just started and unsure if she is taking it or not.   No melena. No BRBPR. No wt loss. No fever chills or sweats.     Past Medical History:   Diagnosis Date   • Alcoholism (HCC)    • Anemia    • Cervical cancer (HCC)    • Depression    • Drug therapy    • History of radiation therapy     5040 cGy to cervix + 3000 cGy (HDRs x 5)   • History of transfusion    • Hx of colonic polyps    • Hx of radiation therapy    • Substance abuse (HCC)      Past Surgical History:   Procedure Laterality Date   • APPENDECTOMY     • CERVICAL BIOPSY     •  SECTION     • CHOLECYSTECTOMY WITH INTRAOPERATIVE CHOLANGIOGRAM N/A 2018    Procedure: CHOLECYSTECTOMY LAPAROSCOPIC ;  Surgeon: Karla Lee MD;  Location: [facility];  Service:    • COLONOSCOPY  2016    Hyperplastic polyp at 20 cm repeat exam in 5 years   • COLONOSCOPY N/A 2019    Tubular adenoma ascending colon, Hyperplastic polyp at 50 cm, tics repeat exam in 5 years   • CYSTOSCOPY      Diagnostic   • DILATATION AND CURETTAGE     • ENDOSCOPY N/A 2019    Normal exam   • HYSTERECTOMY     • KNEE SURGERY     • OOPHORECTOMY     • PELVIC EXAMINATION UNDER ANESTHESIA     • TONSILLECTOMY     • TOTAL LAPAROSCOPIC HYSTERECTOMY WITH DAVINCI ROBOT         Outpatient Medications Marked as Taking for the 3/14/23 encounter (Office Visit) with Hiral Schneider APRN   Medication Sig Dispense Refill   • cholecalciferol (VITAMIN D3) 1000 units tablet Take 1 tablet by mouth Daily.     •  ondansetron ODT (ZOFRAN ODT) 4 MG disintegrating tablet Take 1 tablet by mouth Every 8 (Eight) Hours As Needed for Nausea. 20 tablet 0   • pantoprazole (PROTONIX) 40 MG EC tablet Take 1 tablet by mouth Daily.     • sertraline (ZOLOFT) 100 MG tablet Take 1 tablet by mouth Daily.     • traZODone (DESYREL) 150 MG tablet Take 50 mg by mouth Every Night.       No Known Allergies  Social History     Socioeconomic History   • Marital status:    Tobacco Use   • Smoking status: Some Days     Packs/day: 0.50     Types: Cigarettes     Last attempt to quit: 2016     Years since quittin.2   • Smokeless tobacco: Never   Vaping Use   • Vaping Use: Never used   Substance and Sexual Activity   • Alcohol use: No   • Drug use: No   • Sexual activity: Not Currently     Partners: Male     Birth control/protection: Surgical     Comment: Hysterectomy     Family History   Problem Relation Age of Onset   • Skin cancer Mother    • Cancer Maternal Grandmother         Malignancy   • Cancer Paternal Grandmother         Malignancy   • Colon cancer Neg Hx    • Colon polyps Neg Hx      Health Maintenance   Topic Date Due   • Pneumococcal Vaccine 0-64 (1 - PCV) Never done   • TDAP/TD VACCINES (1 - Tdap) Never done   • HEPATITIS C SCREENING  Never done   • ANNUAL PHYSICAL  Never done   • MAMMOGRAM  2018   • COVID-19 Vaccine (2 - Moderna series) 2021   • INFLUENZA VACCINE  Never done   • ZOSTER VACCINE (1 of 2) Never done   • COLORECTAL CANCER SCREENING  2024     Review of Systems   Constitutional: Negative for activity change, appetite change, chills, diaphoresis, fatigue, fever and unexpected weight change.   HENT: Negative for ear pain, hearing loss, mouth sores, sore throat, trouble swallowing and voice change.    Eyes: Negative.    Respiratory: Negative for cough, choking, shortness of breath and wheezing.    Cardiovascular: Negative for chest pain and palpitations.   Gastrointestinal: Positive for nausea.  "Negative for abdominal pain, blood in stool, constipation, diarrhea and vomiting.   Endocrine: Negative for cold intolerance and heat intolerance.   Genitourinary: Negative for decreased urine volume, dysuria, frequency, hematuria and urgency.   Musculoskeletal: Negative for back pain, gait problem and myalgias.   Skin: Negative for color change, pallor and rash.   Allergic/Immunologic: Negative for food allergies and immunocompromised state.   Neurological: Negative for dizziness, tremors, seizures, syncope, weakness, light-headedness, numbness and headaches.   Hematological: Negative for adenopathy. Does not bruise/bleed easily.   Psychiatric/Behavioral: Negative for agitation and confusion. The patient is not nervous/anxious.    All other systems reviewed and are negative.    Objective   Vitals:    03/14/23 1312   BP: 132/72   Pulse: 85   Temp: 97.5 °F (36.4 °C)   TempSrc: Temporal   SpO2: 96%   Weight: 68.9 kg (152 lb)   Height: 172.7 cm (68\")     Body mass index is 23.11 kg/m².  Physical Exam  Constitutional:       Appearance: She is well-developed.   HENT:      Head: Normocephalic and atraumatic.   Eyes:      Pupils: Pupils are equal, round, and reactive to light.   Neck:      Trachea: No tracheal deviation.   Cardiovascular:      Rate and Rhythm: Normal rate and regular rhythm.      Heart sounds: Normal heart sounds. No murmur heard.    No friction rub. No gallop.   Pulmonary:      Effort: Pulmonary effort is normal. No respiratory distress.      Breath sounds: Normal breath sounds. No wheezing or rales.   Chest:      Chest wall: No tenderness.   Abdominal:      General: Bowel sounds are normal. There is no distension.      Palpations: Abdomen is soft. Abdomen is not rigid.      Tenderness: There is no abdominal tenderness. There is no guarding or rebound.   Musculoskeletal:         General: No tenderness or deformity. Normal range of motion.      Cervical back: Normal range of motion and neck supple. "   Skin:     General: Skin is warm and dry.      Coloration: Skin is not pale.      Findings: No rash.   Neurological:      Mental Status: She is alert and oriented to person, place, and time.      Deep Tendon Reflexes: Reflexes are normal and symmetric.   Psychiatric:         Behavior: Behavior normal.         Thought Content: Thought content normal.         Judgment: Judgment normal.       Assessment & Plan   Diagnoses and all orders for this visit:    1. Nausea (Primary)    2. Dyspepsia  -     Case Request; Standing  -     Follow Anesthesia Guidelines / Protocol; Future  -     Obtain Informed Consent; Future  -     Implement Anesthesia Orders Day of Procedure; Standing  -     Obtain Informed Consent; Standing  -     Case Request    3. Tobacco abuse counseling    I discussed non pharmaceutical treatment of gerd.  This includes gradually losing weight to achieve ideal body wt., elevation of the head of bed by 4-6 inches, nothing to eat or drink 3 hours prior to lying down, avoiding tight clothing, stress reduction, tobacco cessation, reduction of alcohol intake, and dietary restrictions (avoiding caffeine, coffee, fatty foods, mints, chocolate, spicy foods and tomato based sauces as much as possible).    Continue Protonix for now.    ESOPHAGOGASTRODUODENOSCOPY WITH ANESTHESIA (N/A)  Part of this note may be an electronic transcription/translation of spoken language to printed text using the Dragon Dictation System.  Body mass index is 23.11 kg/m².  Return if symptoms worsen or fail to improve.    BMI is within normal parameters. No other follow-up for BMI required.      All risks, benefits, alternatives, and indications of colonoscopy and/or Endoscopy procedure have been discussed with the patient. Risks to include perforation of the colon requiring possible surgery or colostomy, risk of bleeding from biopsies or removal of colon tissue, possibility of missing a colon polyp or cancer, or adverse drug reaction.   Benefits to include the diagnosis and management of disease of the colon and rectum. Alternatives to include barium enema, radiographic evaluation, lab testing or no intervention. Pt verbalizes understanding and agrees.     Hiral Wright Wyatt, APRN  3/14/2023  13:33 CDT          If you smoke or use tobacco, 4 minutes reading provided  Steps to Quit Smoking  Smoking tobacco can be harmful to your health and can affect almost every organ in your body. Smoking puts you, and those around you, at risk for developing many serious chronic diseases. Quitting smoking is difficult, but it is one of the best things that you can do for your health. It is never too late to quit.  What are the benefits of quitting smoking?  When you quit smoking, you lower your risk of developing serious diseases and conditions, such as:  · Lung cancer or lung disease, such as COPD.  · Heart disease.  · Stroke.  · Heart attack.  · Infertility.  · Osteoporosis and bone fractures.  Additionally, symptoms such as coughing, wheezing, and shortness of breath may get better when you quit. You may also find that you get sick less often because your body is stronger at fighting off colds and infections. If you are pregnant, quitting smoking can help to reduce your chances of having a baby of low birth weight.  How do I get ready to quit?  When you decide to quit smoking, create a plan to make sure that you are successful. Before you quit:  · Pick a date to quit. Set a date within the next two weeks to give you time to prepare.  · Write down the reasons why you are quitting. Keep this list in places where you will see it often, such as on your bathroom mirror or in your car or wallet.  · Identify the people, places, things, and activities that make you want to smoke (triggers) and avoid them. Make sure to take these actions:  ¨ Throw away all cigarettes at home, at work, and in your car.  ¨ Throw away smoking accessories, such as ashtrays and  lighters.  ¨ Clean your car and make sure to empty the ashtray.  ¨ Clean your home, including curtains and carpets.  · Tell your family, friends, and coworkers that you are quitting. Support from your loved ones can make quitting easier.  · Talk with your health care provider about your options for quitting smoking.  · Find out what treatment options are covered by your health insurance.  What strategies can I use to quit smoking?  Talk with your healthcare provider about different strategies to quit smoking. Some strategies include:  · Quitting smoking altogether instead of gradually lessening how much you smoke over a period of time. Research shows that quitting “cold turkey” is more successful than gradually quitting.  · Attending in-person counseling to help you build problem-solving skills. You are more likely to have success in quitting if you attend several counseling sessions. Even short sessions of 10 minutes can be effective.  · Finding resources and support systems that can help you to quit smoking and remain smoke-free after you quit. These resources are most helpful when you use them often. They can include:  ¨ Online chats with a counselor.  ¨ Telephone quitlines.  ¨ Printed self-help materials.  ¨ Support groups or group counseling.  ¨ Text messaging programs.  ¨ Mobile phone applications.  · Taking medicines to help you quit smoking. (If you are pregnant or breastfeeding, talk with your health care provider first.) Some medicines contain nicotine and some do not. Both types of medicines help with cravings, but the medicines that include nicotine help to relieve withdrawal symptoms. Your health care provider may recommend:  ¨ Nicotine patches, gum, or lozenges.  ¨ Nicotine inhalers or sprays.  ¨ Non-nicotine medicine that is taken by mouth.  Talk with your health care provider about combining strategies, such as taking medicines while you are also receiving in-person counseling. Using these two  strategies together makes you more likely to succeed in quitting than if you used either strategy on its own.  If you are pregnant or breastfeeding, talk with your health care provider about finding counseling or other support strategies to quit smoking. Do not take medicine to help you quit smoking unless told to do so by your health care provider.  What things can I do to make it easier to quit?  Quitting smoking might feel overwhelming at first, but there is a lot that you can do to make it easier. Take these important actions:  · Reach out to your family and friends and ask that they support and encourage you during this time. Call telephone quitlines, reach out to support groups, or work with a counselor for support.  · Ask people who smoke to avoid smoking around you.  · Avoid places that trigger you to smoke, such as bars, parties, or smoke-break areas at work.  · Spend time around people who do not smoke.  · Lessen stress in your life, because stress can be a smoking trigger for some people. To lessen stress, try:  ¨ Exercising regularly.  ¨ Deep-breathing exercises.  ¨ Yoga.  ¨ Meditating.  ¨ Performing a body scan. This involves closing your eyes, scanning your body from head to toe, and noticing which parts of your body are particularly tense. Purposefully relax the muscles in those areas.  · Download or purchase mobile phone or tablet apps (applications) that can help you stick to your quit plan by providing reminders, tips, and encouragement. There are many free apps, such as QuitGuide from the CDC (Centers for Disease Control and Prevention). You can find other support for quitting smoking (smoking cessation) through smokefree.gov and other websites.  How will I feel when I quit smoking?  Within the first 24 hours of quitting smoking, you may start to feel some withdrawal symptoms. These symptoms are usually most noticeable 2-3 days after quitting, but they usually do not last beyond 2-3 weeks. Changes  or symptoms that you might experience include:  · Mood swings.  · Restlessness, anxiety, or irritation.  · Difficulty concentrating.  · Dizziness.  · Strong cravings for sugary foods in addition to nicotine.  · Mild weight gain.  · Constipation.  · Nausea.  · Coughing or a sore throat.  · Changes in how your medicines work in your body.  · A depressed mood.  · Difficulty sleeping (insomnia).  After the first 2-3 weeks of quitting, you may start to notice more positive results, such as:  · Improved sense of smell and taste.  · Decreased coughing and sore throat.  · Slower heart rate.  · Lower blood pressure.  · Clearer skin.  · The ability to breathe more easily.  · Fewer sick days.  Quitting smoking is very challenging for most people. Do not get discouraged if you are not successful the first time. Some people need to make many attempts to quit before they achieve long-term success. Do your best to stick to your quit plan, and talk with your health care provider if you have any questions or concerns.  This information is not intended to replace advice given to you by your health care provider. Make sure you discuss any questions you have with your health care provider.  Document Released: 12/12/2002 Document Revised: 08/15/2017 Document Reviewed: 05/03/2016  [employer] Interactive Patient Education © 2017 [employer]

## 2023-03-14 NOTE — PROGRESS NOTES
Yesi Bowman  1970    3/14/2023  Chief Complaint   Patient presents with   • GI Problem     Nausea     Subjective   HPI  Yesi Bowman is a 52 y.o. female who presents with a complaint of dyspepsia burping eggs and belching. She has had some nausea as well. She has been taking ENSURE. She says that she is stressed due to her being a cancer survivor, she cant eat or tolerate foods.  She is taking zofran for the nausea as well as Pantoprazole 40 mg daily. She has just started and unsure if she is taking it or not.   No melena. No BRBPR. No wt loss. No fever chills or sweats.     Past Medical History:   Diagnosis Date   • Alcoholism (HCC)    • Anemia    • Cervical cancer (HCC)    • Depression    • Drug therapy    • History of radiation therapy     5040 cGy to cervix + 3000 cGy (HDRs x 5)   • History of transfusion    • Hx of colonic polyps    • Hx of radiation therapy    • Substance abuse (HCC)      Past Surgical History:   Procedure Laterality Date   • APPENDECTOMY     • CERVICAL BIOPSY     •  SECTION     • CHOLECYSTECTOMY WITH INTRAOPERATIVE CHOLANGIOGRAM N/A 2018    Procedure: CHOLECYSTECTOMY LAPAROSCOPIC ;  Surgeon: Karla Lee MD;  Location: [facility], [place];  Service:    • COLONOSCOPY  2016    Hyperplastic polyp at 20 cm repeat exam in 5 years   • COLONOSCOPY N/A 2019    Tubular adenoma ascending colon, Hyperplastic polyp at 50 cm, tics repeat exam in 5 years   • CYSTOSCOPY      Diagnostic   • DILATATION AND CURETTAGE     • ENDOSCOPY N/A 2019    Normal exam   • HYSTERECTOMY     • KNEE SURGERY     • OOPHORECTOMY     • PELVIC EXAMINATION UNDER ANESTHESIA     • TONSILLECTOMY     • TOTAL LAPAROSCOPIC HYSTERECTOMY WITH DAVINCI ROBOT         Outpatient Medications Marked as Taking for the 3/14/23 encounter (Office Visit) with Hiral Schneider APRN   Medication Sig Dispense Refill   • cholecalciferol (VITAMIN D3) 1000 units tablet Take 1 tablet by mouth Daily.     •  ondansetron ODT (ZOFRAN ODT) 4 MG disintegrating tablet Take 1 tablet by mouth Every 8 (Eight) Hours As Needed for Nausea. 20 tablet 0   • pantoprazole (PROTONIX) 40 MG EC tablet Take 1 tablet by mouth Daily.     • sertraline (ZOLOFT) 100 MG tablet Take 1 tablet by mouth Daily.     • traZODone (DESYREL) 150 MG tablet Take 50 mg by mouth Every Night.       No Known Allergies  Social History     Socioeconomic History   • Marital status:    Tobacco Use   • Smoking status: Some Days     Packs/day: 0.50     Types: Cigarettes     Last attempt to quit: 2016     Years since quittin.2   • Smokeless tobacco: Never   Vaping Use   • Vaping Use: Never used   Substance and Sexual Activity   • Alcohol use: No   • Drug use: No   • Sexual activity: Not Currently     Partners: Male     Birth control/protection: Surgical     Comment: Hysterectomy     Family History   Problem Relation Age of Onset   • Skin cancer Mother    • Cancer Maternal Grandmother         Malignancy   • Cancer Paternal Grandmother         Malignancy   • Colon cancer Neg Hx    • Colon polyps Neg Hx      Health Maintenance   Topic Date Due   • Pneumococcal Vaccine 0-64 (1 - PCV) Never done   • TDAP/TD VACCINES (1 - Tdap) Never done   • HEPATITIS C SCREENING  Never done   • ANNUAL PHYSICAL  Never done   • MAMMOGRAM  2018   • COVID-19 Vaccine (2 - Moderna series) 2021   • INFLUENZA VACCINE  Never done   • ZOSTER VACCINE (1 of 2) Never done   • COLORECTAL CANCER SCREENING  2024     Review of Systems   Constitutional: Negative for activity change, appetite change, chills, diaphoresis, fatigue, fever and unexpected weight change.   HENT: Negative for ear pain, hearing loss, mouth sores, sore throat, trouble swallowing and voice change.    Eyes: Negative.    Respiratory: Negative for cough, choking, shortness of breath and wheezing.    Cardiovascular: Negative for chest pain and palpitations.   Gastrointestinal: Positive for nausea.  "Negative for abdominal pain, blood in stool, constipation, diarrhea and vomiting.   Endocrine: Negative for cold intolerance and heat intolerance.   Genitourinary: Negative for decreased urine volume, dysuria, frequency, hematuria and urgency.   Musculoskeletal: Negative for back pain, gait problem and myalgias.   Skin: Negative for color change, pallor and rash.   Allergic/Immunologic: Negative for food allergies and immunocompromised state.   Neurological: Negative for dizziness, tremors, seizures, syncope, weakness, light-headedness, numbness and headaches.   Hematological: Negative for adenopathy. Does not bruise/bleed easily.   Psychiatric/Behavioral: Negative for agitation and confusion. The patient is not nervous/anxious.    All other systems reviewed and are negative.    Objective   Vitals:    03/14/23 1312   BP: 132/72   Pulse: 85   Temp: 97.5 °F (36.4 °C)   TempSrc: Temporal   SpO2: 96%   Weight: 68.9 kg (152 lb)   Height: 172.7 cm (68\")     Body mass index is 23.11 kg/m².  Physical Exam  Constitutional:       Appearance: She is well-developed.   HENT:      Head: Normocephalic and atraumatic.   Eyes:      Pupils: Pupils are equal, round, and reactive to light.   Neck:      Trachea: No tracheal deviation.   Cardiovascular:      Rate and Rhythm: Normal rate and regular rhythm.      Heart sounds: Normal heart sounds. No murmur heard.    No friction rub. No gallop.   Pulmonary:      Effort: Pulmonary effort is normal. No respiratory distress.      Breath sounds: Normal breath sounds. No wheezing or rales.   Chest:      Chest wall: No tenderness.   Abdominal:      General: Bowel sounds are normal. There is no distension.      Palpations: Abdomen is soft. Abdomen is not rigid.      Tenderness: There is no abdominal tenderness. There is no guarding or rebound.   Musculoskeletal:         General: No tenderness or deformity. Normal range of motion.      Cervical back: Normal range of motion and neck supple. "   Skin:     General: Skin is warm and dry.      Coloration: Skin is not pale.      Findings: No rash.   Neurological:      Mental Status: She is alert and oriented to person, place, and time.      Deep Tendon Reflexes: Reflexes are normal and symmetric.   Psychiatric:         Behavior: Behavior normal.         Thought Content: Thought content normal.         Judgment: Judgment normal.       Assessment & Plan   Diagnoses and all orders for this visit:    1. Nausea (Primary)    2. Dyspepsia  -     Case Request; Standing  -     Follow Anesthesia Guidelines / Protocol; Future  -     Obtain Informed Consent; Future  -     Implement Anesthesia Orders Day of Procedure; Standing  -     Obtain Informed Consent; Standing  -     Case Request    3. Tobacco abuse counseling    I discussed non pharmaceutical treatment of gerd.  This includes gradually losing weight to achieve ideal body wt., elevation of the head of bed by 4-6 inches, nothing to eat or drink 3 hours prior to lying down, avoiding tight clothing, stress reduction, tobacco cessation, reduction of alcohol intake, and dietary restrictions (avoiding caffeine, coffee, fatty foods, mints, chocolate, spicy foods and tomato based sauces as much as possible).    Continue Protonix for now.    ESOPHAGOGASTRODUODENOSCOPY WITH ANESTHESIA (N/A)  Part of this note may be an electronic transcription/translation of spoken language to printed text using the Dragon Dictation System.  Body mass index is 23.11 kg/m².  Return if symptoms worsen or fail to improve.    BMI is within normal parameters. No other follow-up for BMI required.      All risks, benefits, alternatives, and indications of colonoscopy and/or Endoscopy procedure have been discussed with the patient. Risks to include perforation of the colon requiring possible surgery or colostomy, risk of bleeding from biopsies or removal of colon tissue, possibility of missing a colon polyp or cancer, or adverse drug reaction.   Benefits to include the diagnosis and management of disease of the colon and rectum. Alternatives to include barium enema, radiographic evaluation, lab testing or no intervention. Pt verbalizes understanding and agrees.     Hiral Wright Wyatt, APRN  3/14/2023  13:33 CDT          If you smoke or use tobacco, 4 minutes reading provided  Steps to Quit Smoking  Smoking tobacco can be harmful to your health and can affect almost every organ in your body. Smoking puts you, and those around you, at risk for developing many serious chronic diseases. Quitting smoking is difficult, but it is one of the best things that you can do for your health. It is never too late to quit.  What are the benefits of quitting smoking?  When you quit smoking, you lower your risk of developing serious diseases and conditions, such as:  · Lung cancer or lung disease, such as COPD.  · Heart disease.  · Stroke.  · Heart attack.  · Infertility.  · Osteoporosis and bone fractures.  Additionally, symptoms such as coughing, wheezing, and shortness of breath may get better when you quit. You may also find that you get sick less often because your body is stronger at fighting off colds and infections. If you are pregnant, quitting smoking can help to reduce your chances of having a baby of low birth weight.  How do I get ready to quit?  When you decide to quit smoking, create a plan to make sure that you are successful. Before you quit:  · Pick a date to quit. Set a date within the next two weeks to give you time to prepare.  · Write down the reasons why you are quitting. Keep this list in places where you will see it often, such as on your bathroom mirror or in your car or wallet.  · Identify the people, places, things, and activities that make you want to smoke (triggers) and avoid them. Make sure to take these actions:  ¨ Throw away all cigarettes at home, at work, and in your car.  ¨ Throw away smoking accessories, such as ashtrays and  lighters.  ¨ Clean your car and make sure to empty the ashtray.  ¨ Clean your home, including curtains and carpets.  · Tell your family, friends, and coworkers that you are quitting. Support from your loved ones can make quitting easier.  · Talk with your health care provider about your options for quitting smoking.  · Find out what treatment options are covered by your health insurance.  What strategies can I use to quit smoking?  Talk with your healthcare provider about different strategies to quit smoking. Some strategies include:  · Quitting smoking altogether instead of gradually lessening how much you smoke over a period of time. Research shows that quitting “cold turkey” is more successful than gradually quitting.  · Attending in-person counseling to help you build problem-solving skills. You are more likely to have success in quitting if you attend several counseling sessions. Even short sessions of 10 minutes can be effective.  · Finding resources and support systems that can help you to quit smoking and remain smoke-free after you quit. These resources are most helpful when you use them often. They can include:  ¨ Online chats with a counselor.  ¨ Telephone quitlines.  ¨ Printed self-help materials.  ¨ Support groups or group counseling.  ¨ Text messaging programs.  ¨ Mobile phone applications.  · Taking medicines to help you quit smoking. (If you are pregnant or breastfeeding, talk with your health care provider first.) Some medicines contain nicotine and some do not. Both types of medicines help with cravings, but the medicines that include nicotine help to relieve withdrawal symptoms. Your health care provider may recommend:  ¨ Nicotine patches, gum, or lozenges.  ¨ Nicotine inhalers or sprays.  ¨ Non-nicotine medicine that is taken by mouth.  Talk with your health care provider about combining strategies, such as taking medicines while you are also receiving in-person counseling. Using these two  strategies together makes you more likely to succeed in quitting than if you used either strategy on its own.  If you are pregnant or breastfeeding, talk with your health care provider about finding counseling or other support strategies to quit smoking. Do not take medicine to help you quit smoking unless told to do so by your health care provider.  What things can I do to make it easier to quit?  Quitting smoking might feel overwhelming at first, but there is a lot that you can do to make it easier. Take these important actions:  · Reach out to your family and friends and ask that they support and encourage you during this time. Call telephone quitlines, reach out to support groups, or work with a counselor for support.  · Ask people who smoke to avoid smoking around you.  · Avoid places that trigger you to smoke, such as bars, parties, or smoke-break areas at work.  · Spend time around people who do not smoke.  · Lessen stress in your life, because stress can be a smoking trigger for some people. To lessen stress, try:  ¨ Exercising regularly.  ¨ Deep-breathing exercises.  ¨ Yoga.  ¨ Meditating.  ¨ Performing a body scan. This involves closing your eyes, scanning your body from head to toe, and noticing which parts of your body are particularly tense. Purposefully relax the muscles in those areas.  · Download or purchase mobile phone or tablet apps (applications) that can help you stick to your quit plan by providing reminders, tips, and encouragement. There are many free apps, such as QuitGuide from the CDC (Centers for Disease Control and Prevention). You can find other support for quitting smoking (smoking cessation) through smokefree.gov and other websites.  How will I feel when I quit smoking?  Within the first 24 hours of quitting smoking, you may start to feel some withdrawal symptoms. These symptoms are usually most noticeable 2-3 days after quitting, but they usually do not last beyond 2-3 weeks. Changes  or symptoms that you might experience include:  · Mood swings.  · Restlessness, anxiety, or irritation.  · Difficulty concentrating.  · Dizziness.  · Strong cravings for sugary foods in addition to nicotine.  · Mild weight gain.  · Constipation.  · Nausea.  · Coughing or a sore throat.  · Changes in how your medicines work in your body.  · A depressed mood.  · Difficulty sleeping (insomnia).  After the first 2-3 weeks of quitting, you may start to notice more positive results, such as:  · Improved sense of smell and taste.  · Decreased coughing and sore throat.  · Slower heart rate.  · Lower blood pressure.  · Clearer skin.  · The ability to breathe more easily.  · Fewer sick days.  Quitting smoking is very challenging for most people. Do not get discouraged if you are not successful the first time. Some people need to make many attempts to quit before they achieve long-term success. Do your best to stick to your quit plan, and talk with your health care provider if you have any questions or concerns.  This information is not intended to replace advice given to you by your health care provider. Make sure you discuss any questions you have with your health care provider.  Document Released: 12/12/2002 Document Revised: 08/15/2017 Document Reviewed: 05/03/2016  Elsevier Interactive Patient Education © 2017 [employer]

## 2023-03-16 ENCOUNTER — ANESTHESIA (OUTPATIENT)
Dept: GASTROENTEROLOGY | Facility: HOSPITAL | Age: 53
End: 2023-03-16
Payer: COMMERCIAL

## 2023-03-16 ENCOUNTER — ANESTHESIA EVENT (OUTPATIENT)
Dept: GASTROENTEROLOGY | Facility: HOSPITAL | Age: 53
End: 2023-03-16
Payer: COMMERCIAL

## 2023-03-16 ENCOUNTER — HOSPITAL ENCOUNTER (OUTPATIENT)
Facility: HOSPITAL | Age: 53
Setting detail: HOSPITAL OUTPATIENT SURGERY
Discharge: HOME | End: 2023-03-16
Attending: INTERNAL MEDICINE | Admitting: INTERNAL MEDICINE
Payer: COMMERCIAL

## 2023-03-16 VITALS
HEIGHT: 68 IN | OXYGEN SATURATION: 99 % | RESPIRATION RATE: 22 BRPM | DIASTOLIC BLOOD PRESSURE: 82 MMHG | TEMPERATURE: 96.8 F | HEART RATE: 88 BPM | BODY MASS INDEX: 22.43 KG/M2 | SYSTOLIC BLOOD PRESSURE: 101 MMHG | WEIGHT: 148 LBS

## 2023-03-16 DIAGNOSIS — R10.13 DYSPEPSIA: ICD-10-CM

## 2023-03-16 PROCEDURE — 43239 EGD BIOPSY SINGLE/MULTIPLE: CPT | Performed by: INTERNAL MEDICINE

## 2023-03-16 PROCEDURE — 88305 TISSUE EXAM BY PATHOLOGIST: CPT | Performed by: INTERNAL MEDICINE

## 2023-03-16 PROCEDURE — 25010000002 PROPOFOL 10 MG/ML EMULSION: Performed by: NURSE ANESTHETIST, CERTIFIED REGISTERED

## 2023-03-16 PROCEDURE — 87081 CULTURE SCREEN ONLY: CPT | Performed by: INTERNAL MEDICINE

## 2023-03-16 RX ORDER — SODIUM CHLORIDE 9 MG/ML
100 INJECTION, SOLUTION INTRAVENOUS CONTINUOUS
Status: CANCELLED | OUTPATIENT
Start: 2023-03-16

## 2023-03-16 RX ORDER — SODIUM CHLORIDE 0.9 % (FLUSH) 0.9 %
10 SYRINGE (ML) INJECTION AS NEEDED
Status: DISCONTINUED | OUTPATIENT
Start: 2023-03-16 | End: 2023-03-16 | Stop reason: HOSPADM

## 2023-03-16 RX ORDER — SODIUM CHLORIDE 9 MG/ML
500 INJECTION, SOLUTION INTRAVENOUS CONTINUOUS PRN
Status: DISCONTINUED | OUTPATIENT
Start: 2023-03-16 | End: 2023-03-16 | Stop reason: HOSPADM

## 2023-03-16 RX ORDER — SODIUM CHLORIDE 9 MG/ML
40 INJECTION, SOLUTION INTRAVENOUS AS NEEDED
Status: CANCELLED | OUTPATIENT
Start: 2023-03-16

## 2023-03-16 RX ORDER — LIDOCAINE HYDROCHLORIDE 20 MG/ML
INJECTION, SOLUTION EPIDURAL; INFILTRATION; INTRACAUDAL; PERINEURAL AS NEEDED
Status: DISCONTINUED | OUTPATIENT
Start: 2023-03-16 | End: 2023-03-16 | Stop reason: SURG

## 2023-03-16 RX ORDER — SODIUM CHLORIDE 0.9 % (FLUSH) 0.9 %
10 SYRINGE (ML) INJECTION EVERY 12 HOURS SCHEDULED
Status: CANCELLED | OUTPATIENT
Start: 2023-03-16

## 2023-03-16 RX ORDER — PROPOFOL 10 MG/ML
VIAL (ML) INTRAVENOUS AS NEEDED
Status: DISCONTINUED | OUTPATIENT
Start: 2023-03-16 | End: 2023-03-16 | Stop reason: SURG

## 2023-03-16 RX ORDER — SODIUM CHLORIDE 0.9 % (FLUSH) 0.9 %
10 SYRINGE (ML) INJECTION AS NEEDED
Status: CANCELLED | OUTPATIENT
Start: 2023-03-16

## 2023-03-16 RX ADMIN — PROPOFOL INJECTABLE EMULSION 50 MG: 10 INJECTION, EMULSION INTRAVENOUS at 10:36

## 2023-03-16 RX ADMIN — PROPOFOL INJECTABLE EMULSION 50 MG: 10 INJECTION, EMULSION INTRAVENOUS at 10:34

## 2023-03-16 RX ADMIN — LIDOCAINE HYDROCHLORIDE 150 MG: 20 INJECTION, SOLUTION EPIDURAL; INFILTRATION; INTRACAUDAL; PERINEURAL at 10:32

## 2023-03-16 RX ADMIN — PROPOFOL INJECTABLE EMULSION 100 MG: 10 INJECTION, EMULSION INTRAVENOUS at 10:32

## 2023-03-16 RX ADMIN — SODIUM CHLORIDE 500 ML: 9 INJECTION, SOLUTION INTRAVENOUS at 09:52

## 2023-03-16 NOTE — ANESTHESIA POSTPROCEDURE EVALUATION
Patient: Yesi Bowman    Procedure Summary     Date: 03/16/23 Room / Location: [facility] ENDOSCOPY 4 / [facility] END[facility]COPY    Anesthesia Start: 1026 Anesthesia Stop: 1044    Procedure: ESOPHAGOGASTRODUODENOSCOPY WITH ANESTHESIA Diagnosis:       Dyspepsia      (Dyspepsia [R10.13])    Surgeons: Kiran Gonzalez MD Provider: Kathe Cope CRNA    Anesthesia Type: MAC ASA Status: 2          Anesthesia Type: MAC    Vitals  Vitals Value Taken Time   /82 03/16/23 1101   Temp     Pulse 78 03/16/23 1105   Resp 22 03/16/23 1100   SpO2 96 % 03/16/23 1105   Vitals shown include unvalidated device data.        Post Anesthesia Care and Evaluation    Patient location during evaluation: PHASE II  Patient participation: complete - patient participated  Level of consciousness: awake and alert  Pain management: adequate    Airway patency: patent  Anesthetic complications: No anesthetic complications  PONV Status: none  Cardiovascular status: acceptable  Respiratory status: acceptable  Hydration status: acceptable

## 2023-03-16 NOTE — ANESTHESIA PREPROCEDURE EVALUATION
Anesthesia Evaluation     Patient summary reviewed   no history of anesthetic complications:  NPO Solid Status: > 8 hours  NPO Liquid Status: > 2 hours           Airway   Mallampati: I  TM distance: >3 FB  Neck ROM: full  No difficulty expected  Dental          Pulmonary    (+) a smoker Current,   (-) sleep apnea  Cardiovascular   Exercise tolerance: good (4-7 METS)    (-) hypertension, CAD      Neuro/Psych  (-) seizures, TIA, CVA  GI/Hepatic/Renal/Endo    (+) obesity,     (-) liver disease, no renal disease, diabetes    Musculoskeletal     Abdominal    Substance History   (+) alcohol use (h/o etoh abuse, pt reports this was years ago),      OB/GYN          Other      history of cancer (cervical cancer) remission                      Anesthesia Plan    ASA 2     MAC     intravenous induction     Anesthetic plan, risks, benefits, and alternatives have been provided, discussed and informed consent has been obtained with: patient.

## 2023-03-17 LAB
CYTO UR: NORMAL
LAB AP CASE REPORT: NORMAL
Lab: NORMAL
PATH REPORT.FINAL DX SPEC: NORMAL
PATH REPORT.GROSS SPEC: NORMAL
UREASE TISS QL: NEGATIVE

## 2023-07-14 ENCOUNTER — TELEPHONE (OUTPATIENT)
Dept: ONCOLOGY | Facility: CLINIC | Age: 53
End: 2023-07-14

## 2023-07-14 NOTE — TELEPHONE ENCOUNTER
Caller: Valentino Bowman    Relationship: Self    Best call back number: [phone]    What is the best time to reach you: ANY    Who are you requesting to speak with (clinical staff, provider,  specific staff member): CLINICAL     What was the call regarding: VALENTINO IS CALLING TO GET AN APPT WITH ROSETTE ESPINOSA.  SHE STATES SHE IS NOT EATING AND HAS LOST WEIGHT     Is it okay if the provider responds through MyChart: YES

## 2023-08-02 DIAGNOSIS — C53.9 MALIGNANT NEOPLASM OF CERVIX, UNSPECIFIED SITE: Primary | ICD-10-CM

## 2023-08-04 ENCOUNTER — OFFICE VISIT (OUTPATIENT)
Dept: ONCOLOGY | Facility: CLINIC | Age: 53
End: 2023-08-04
Payer: COMMERCIAL

## 2023-08-04 ENCOUNTER — LAB (OUTPATIENT)
Dept: LAB | Facility: HOSPITAL | Age: 53
End: 2023-08-04
Payer: COMMERCIAL

## 2023-08-04 VITALS
RESPIRATION RATE: 16 BRPM | DIASTOLIC BLOOD PRESSURE: 82 MMHG | HEART RATE: 73 BPM | TEMPERATURE: 97.9 F | WEIGHT: 141.4 LBS | HEIGHT: 68 IN | OXYGEN SATURATION: 97 % | SYSTOLIC BLOOD PRESSURE: 124 MMHG | BODY MASS INDEX: 21.43 KG/M2

## 2023-08-04 DIAGNOSIS — K44.9 HIATAL HERNIA: ICD-10-CM

## 2023-08-04 DIAGNOSIS — Z85.41 HISTORY OF CERVICAL CANCER: Primary | ICD-10-CM

## 2023-08-04 DIAGNOSIS — C53.9 MALIGNANT NEOPLASM OF CERVIX, UNSPECIFIED SITE: Primary | ICD-10-CM

## 2023-08-04 DIAGNOSIS — F32.A DEPRESSION, UNSPECIFIED DEPRESSION TYPE: ICD-10-CM

## 2023-08-04 DIAGNOSIS — Z72.0 TOBACCO USE: ICD-10-CM

## 2023-08-04 DIAGNOSIS — R63.4 UNINTENTIONAL WEIGHT LOSS: ICD-10-CM

## 2023-08-04 LAB
ALBUMIN SERPL-MCNC: 4.4 G/DL (ref 3.5–5.2)
ALBUMIN/GLOB SERPL: 1.8 G/DL
ALP SERPL-CCNC: 69 U/L (ref 39–117)
ALT SERPL W P-5'-P-CCNC: 9 U/L (ref 1–33)
ANION GAP SERPL CALCULATED.3IONS-SCNC: 7 MMOL/L (ref 5–15)
AST SERPL-CCNC: 15 U/L (ref 1–32)
BASOPHILS # BLD AUTO: 0.05 10*3/MM3 (ref 0–0.2)
BASOPHILS NFR BLD AUTO: 0.8 % (ref 0–1.5)
BILIRUB SERPL-MCNC: 0.2 MG/DL (ref 0–1.2)
BUN SERPL-MCNC: 9 MG/DL (ref 6–20)
BUN/CREAT SERPL: 13.2 (ref 7–25)
CALCIUM SPEC-SCNC: 9.7 MG/DL (ref 8.6–10.5)
CHLORIDE SERPL-SCNC: 102 MMOL/L (ref 98–107)
CO2 SERPL-SCNC: 27 MMOL/L (ref 22–29)
CREAT SERPL-MCNC: 0.68 MG/DL (ref 0.57–1)
DEPRECATED RDW RBC AUTO: 48.1 FL (ref 37–54)
EGFRCR SERPLBLD CKD-EPI 2021: 106.3 ML/MIN/1.73
EOSINOPHIL # BLD AUTO: 0.18 10*3/MM3 (ref 0–0.4)
EOSINOPHIL NFR BLD AUTO: 2.9 % (ref 0.3–6.2)
ERYTHROCYTE [DISTWIDTH] IN BLOOD BY AUTOMATED COUNT: 13.2 % (ref 12.3–15.4)
GLOBULIN UR ELPH-MCNC: 2.5 GM/DL
GLUCOSE SERPL-MCNC: 104 MG/DL (ref 65–99)
HCT VFR BLD AUTO: 43.4 % (ref 34–46.6)
HGB BLD-MCNC: 13.4 G/DL (ref 12–15.9)
HOLD SPECIMEN: NORMAL
IMM GRANULOCYTES # BLD AUTO: 0.01 10*3/MM3 (ref 0–0.05)
IMM GRANULOCYTES NFR BLD AUTO: 0.2 % (ref 0–0.5)
LYMPHOCYTES # BLD AUTO: 1.87 10*3/MM3 (ref 0.7–3.1)
LYMPHOCYTES NFR BLD AUTO: 30.2 % (ref 19.6–45.3)
MCH RBC QN AUTO: 30.5 PG (ref 26.6–33)
MCHC RBC AUTO-ENTMCNC: 30.9 G/DL (ref 31.5–35.7)
MCV RBC AUTO: 98.6 FL (ref 79–97)
MONOCYTES # BLD AUTO: 0.48 10*3/MM3 (ref 0.1–0.9)
MONOCYTES NFR BLD AUTO: 7.8 % (ref 5–12)
NEUTROPHILS NFR BLD AUTO: 3.6 10*3/MM3 (ref 1.7–7)
NEUTROPHILS NFR BLD AUTO: 58.1 % (ref 42.7–76)
NRBC BLD AUTO-RTO: 0 /100 WBC (ref 0–0.2)
PLATELET # BLD AUTO: 223 10*3/MM3 (ref 140–450)
PMV BLD AUTO: 9.2 FL (ref 6–12)
POTASSIUM SERPL-SCNC: 4.2 MMOL/L (ref 3.5–5.2)
PROT SERPL-MCNC: 6.9 G/DL (ref 6–8.5)
RBC # BLD AUTO: 4.4 10*6/MM3 (ref 3.77–5.28)
SODIUM SERPL-SCNC: 136 MMOL/L (ref 136–145)
WBC NRBC COR # BLD: 6.19 10*3/MM3 (ref 3.4–10.8)

## 2023-08-04 PROCEDURE — 36415 COLL VENOUS BLD VENIPUNCTURE: CPT

## 2023-08-04 PROCEDURE — 85025 COMPLETE CBC W/AUTO DIFF WBC: CPT

## 2023-08-04 PROCEDURE — 80053 COMPREHEN METABOLIC PANEL: CPT

## 2023-08-04 RX ORDER — VORTIOXETINE 10 MG/1
TABLET, FILM COATED ORAL
COMMUNITY
Start: 2023-06-29

## 2023-08-23 ENCOUNTER — TELEPHONE (OUTPATIENT)
Dept: ONCOLOGY | Facility: CLINIC | Age: 53
End: 2023-08-23
Payer: COMMERCIAL

## 2023-08-23 NOTE — TELEPHONE ENCOUNTER
ROSETTE BAUTISTA HAS PUT IN A REFERRAL FOR VALENTINO TO SEE GENERAL SURGERY FOR A HERNIA. I RECEIVED A CALL FROM [place] IN [facility] AND SHE EXPLAINED THAT DR. TILLMAN WOULD SEE HER, BUT WOULD LIKE VALENTINO TO HAVE AN UPPER GI DONE BEFORE THEY MAKE THE APPT. I SENT A MESSAGE TO ROSETTE EXPLAINING THIS AND SHE ASKED FOR ME TO SEND HER BACK TO GASTROENTEROLOGY. I CALL GASTROENTEROLOGY AND SET VALENTINO UP AN APPT FOR 08/30/2023 AT 12:45 WITH JAZMYNE MORENO.     I TRIED CALLING VALENTINO TO LET HER KNOW ABOUT THIS APPT UNFORTUNATELY I COULD NOT LVM AT THIS TIME DUE VALENTINO'S VM IS FULL. I WILL TRY AGAIN TO REACH OUT TO HER. 08/23/2023 BS

## 2023-08-29 NOTE — PLAN OF CARE
Physical Therapy    Initial Assessment     Name: Jolie Babcock  : 1932  MRN: [id number]      Date of Service: 2023    Evaluating PT: Janelanay House, PT, DPT [id number]      Room #:  8210/8210-A  Diagnosis:  Diverticulitis of colon [K57.32]  Altered mental status, unspecified altered mental status type [R41.82]  AMS (altered mental status) [R41.82]  PMHx/PSHx:   has a past medical history of Diabetes ([address]) and Hypertension. Procedure/Surgery:  NA  Precautions:  Falls, aphasia,   Equipment Needs:  NA    SUBJECTIVE:    Pt was mostly nonverbal throughout the session. She was unable to communicate social history. Per chart, pt lives alone in an apartment. Pt ambulated with [facility] prior to admission. OBJECTIVE:   Initial Evaluation  Date: 23 Treatment Date: Short Term/ Long Term   Goals   AM-PAC 6 Clicks      Was pt agreeable to Eval/treatment? Yes     Does pt have pain? No     Bed Mobility  Rolling: Mod A  Supine to sit: Max A  Sit to supine: Max A  Scooting: Max A to EOB  Rolling: CGA  Supine to sit: CGA  Sit to supine: CGA  Scooting: CGA   Transfers Sit to stand: Mod A x2  Stand to sit: Mod A x2   Stand pivot: NT  Sit to stand: CGA  Stand to sit: CGA  Stand pivot: CGA with [facility]   Ambulation   2-3 small side steps with [id number] assist with Max A x2  >50 feet with WW with CGA   Stair negotiation: ascended and descended NT  >4 step(s) with 2 rail(s) with Min A   ROM BUE: Refer to OT note  BLE: WFL     Strength BUE: Refer to OT note  BLE: 4/5 grossly     Balance Sitting EOB: CGA  Dynamic Standing: Mod A x2 with [id number] assist  Sitting EOB: Independent  Dynamic Standing: CGA with WW     Pt is A & O x: Pt is nonverbal. Pt responded to name and could follow simple commands with increased verbal, tactile, and visual cues. Sensation: Unable to assess; pt is nonverbal.  Edema: Unremarkable. Patient education  Pt educated on PT role in acute care setting.     Patient response to Problem: Patient Care Overview (Adult)  Goal: Plan of Care Review  Outcome: Ongoing (interventions implemented as appropriate)   03/09/18 1411   Coping/Psychosocial Response Interventions   Plan Of Care Reviewed With patient   Patient Care Overview   Progress improving   Outcome Evaluation   Outcome Summary/Follow up Plan prn meds for pain as ordered meets pacu discharge criteria       Problem: Perioperative Period (Adult)  Goal: Signs and Symptoms of Listed Potential Problems Will be Absent or Manageable (Perioperative Period)  Outcome: Ongoing (interventions implemented as appropriate)         education:   Pt verbalized understanding Pt demonstrated skill Pt requires further education in this area   No NA Yes     ASSESSMENT:    Conditions Requiring Skilled Therapeutic Intervention:    [x]Decreased strength     []Decreased ROM  [x]Decreased functional mobility  [x]Decreased balance   [x]Decreased endurance   [x]Decreased posture  []Decreased sensation  []Decreased coordination   []Decreased vision  [x]Decreased safety awareness   []Increased pain     Comments:    Pt was in bed upon room entry, agreeable to PT evaluation.  present. Pt was not easily woken up. Pt responded to name but did not communicate throughout session. Pt able to follow some commands. Pt was assisted to EOB and did not appear to be in any distress. Pt sat EOB for 5+ minutes with CGA and no significant LOB. Pt completed STS transfer at EOB with side by side assist. Pt completed small side steps with assist provided to help advance feet. Pt was unsteady and delayed. Pt was placed back in bed with all needs met at conclusion of session.  present. Treatment:  Patient practiced and was instructed in the following treatment:    Bed mobility: Pt required cues for technique in bed mobility. Transfers: Pt required cues for hand placement and forward lean in transfers. Ambulation: Pt required cues for sequencing and wt shifting during side steps. Education: Pt was educated on PT role in acute care setting. Pt sat EOB for 5+ minutes (core control and upright tolerance). Vitals and symptoms closely monitored throughout session. Pt was skillfully positioned in bed to protect skin and joint integrity. Pt's/family goals:  None stated. Prognosis is Fair for reaching above PT goals. Patient and or family understand(s) diagnosis, prognosis, and plan of care.   No.    PHYSICAL THERAPY PLAN OF CARE:    PT POC is established based on physician order and patient diagnosis     Referring provider/PT

## 2023-09-05 ENCOUNTER — TELEPHONE (OUTPATIENT)
Dept: ONCOLOGY | Facility: CLINIC | Age: 53
End: 2023-09-05
Payer: COMMERCIAL

## 2023-09-05 NOTE — TELEPHONE ENCOUNTER
I RECEIVED A MESSAGE FROM [facility] IN [facility] ABOUT VALENTINO. ROSETTE ESPINOSA WANTED HER TO SEE GENERAL SURGERY FOR A HIATAL HERNIA. DR. TILLMAN STATED VALENTINO WOULD HAVE TO HAVE AN UPPER GI BEFORE HE WOULD EVEN SEE HER. I SENT ROSETTE A MESSAGE ABOUT THIS IN AUGUST/2023 AND SHE STATED THAT SHE NEEDED TO BE REFERRED BACK TO GASTROENTEROLOGY. I CALL DR. DEJESUS OFFICE AND SET VALENTINO UP WITH AN APPT FOR 08/31/2023, BUT VALENTINO CANCELED THIS APPT.     ANYA INFORMED ME THAT THEY WOULD NOT SCHEDULE HER UNTIL SHE HAD THIS UPPER GI. I HAVE SENT A MESSAGE TO ROSETTE IN REGARDS TO THIS. 09/05/2023 BS

## 2024-01-15 ENCOUNTER — LAB (OUTPATIENT)
Dept: LAB | Facility: HOSPITAL | Age: 54
End: 2024-01-15
Payer: COMMERCIAL

## 2024-01-15 ENCOUNTER — OFFICE VISIT (OUTPATIENT)
Dept: GASTROENTEROLOGY | Facility: CLINIC | Age: 54
End: 2024-01-15
Payer: COMMERCIAL

## 2024-01-15 VITALS
WEIGHT: 140 LBS | HEIGHT: 68 IN | OXYGEN SATURATION: 97 % | SYSTOLIC BLOOD PRESSURE: 130 MMHG | HEART RATE: 73 BPM | TEMPERATURE: 95 F | DIASTOLIC BLOOD PRESSURE: 84 MMHG | BODY MASS INDEX: 21.22 KG/M2

## 2024-01-15 DIAGNOSIS — R10.13 EPIGASTRIC PAIN: ICD-10-CM

## 2024-01-15 DIAGNOSIS — K44.9 HIATAL HERNIA: ICD-10-CM

## 2024-01-15 DIAGNOSIS — Z86.010 HX OF ADENOMATOUS COLONIC POLYPS: ICD-10-CM

## 2024-01-15 DIAGNOSIS — R63.4 WEIGHT LOSS: ICD-10-CM

## 2024-01-15 DIAGNOSIS — R10.13 EPIGASTRIC PAIN: Primary | ICD-10-CM

## 2024-01-15 DIAGNOSIS — R11.0 NAUSEA: ICD-10-CM

## 2024-01-15 DIAGNOSIS — Z71.6 TOBACCO ABUSE COUNSELING: ICD-10-CM

## 2024-01-15 LAB
ALBUMIN SERPL-MCNC: 4.9 G/DL (ref 3.5–5.2)
ALBUMIN/GLOB SERPL: 1.8 G/DL
ALP SERPL-CCNC: 65 U/L (ref 39–117)
ALT SERPL W P-5'-P-CCNC: 8 U/L (ref 1–33)
ANION GAP SERPL CALCULATED.3IONS-SCNC: 7 MMOL/L (ref 5–15)
AST SERPL-CCNC: 12 U/L (ref 1–32)
BASOPHILS # BLD AUTO: 0.05 10*3/MM3 (ref 0–0.2)
BASOPHILS NFR BLD AUTO: 0.6 % (ref 0–1.5)
BILIRUB SERPL-MCNC: 0.3 MG/DL (ref 0–1.2)
BUN SERPL-MCNC: 10 MG/DL (ref 6–20)
BUN/CREAT SERPL: 15.6 (ref 7–25)
CALCIUM SPEC-SCNC: 10.7 MG/DL (ref 8.6–10.5)
CHLORIDE SERPL-SCNC: 102 MMOL/L (ref 98–107)
CO2 SERPL-SCNC: 31 MMOL/L (ref 22–29)
CREAT SERPL-MCNC: 0.64 MG/DL (ref 0.57–1)
CRP SERPL-MCNC: <0.3 MG/DL (ref 0–0.5)
DEPRECATED RDW RBC AUTO: 46.8 FL (ref 37–54)
EGFRCR SERPLBLD CKD-EPI 2021: 107.1 ML/MIN/1.73
EOSINOPHIL # BLD AUTO: 0.1 10*3/MM3 (ref 0–0.4)
EOSINOPHIL NFR BLD AUTO: 1.3 % (ref 0.3–6.2)
ERYTHROCYTE [DISTWIDTH] IN BLOOD BY AUTOMATED COUNT: 13.2 % (ref 12.3–15.4)
ERYTHROCYTE [SEDIMENTATION RATE] IN BLOOD: 6 MM/HR (ref 0–30)
GLOBULIN UR ELPH-MCNC: 2.8 GM/DL
GLUCOSE SERPL-MCNC: 97 MG/DL (ref 65–99)
HCT VFR BLD AUTO: 44.8 % (ref 34–46.6)
HGB BLD-MCNC: 13.8 G/DL (ref 12–15.9)
IMM GRANULOCYTES # BLD AUTO: 0.02 10*3/MM3 (ref 0–0.05)
IMM GRANULOCYTES NFR BLD AUTO: 0.3 % (ref 0–0.5)
LIPASE SERPL-CCNC: 87 U/L (ref 13–60)
LYMPHOCYTES # BLD AUTO: 1.72 10*3/MM3 (ref 0.7–3.1)
LYMPHOCYTES NFR BLD AUTO: 21.6 % (ref 19.6–45.3)
MCH RBC QN AUTO: 29.7 PG (ref 26.6–33)
MCHC RBC AUTO-ENTMCNC: 30.8 G/DL (ref 31.5–35.7)
MCV RBC AUTO: 96.6 FL (ref 79–97)
MONOCYTES # BLD AUTO: 0.42 10*3/MM3 (ref 0.1–0.9)
MONOCYTES NFR BLD AUTO: 5.3 % (ref 5–12)
NEUTROPHILS NFR BLD AUTO: 5.65 10*3/MM3 (ref 1.7–7)
NEUTROPHILS NFR BLD AUTO: 70.9 % (ref 42.7–76)
NRBC BLD AUTO-RTO: 0 /100 WBC (ref 0–0.2)
PLATELET # BLD AUTO: 257 10*3/MM3 (ref 140–450)
PMV BLD AUTO: 9.2 FL (ref 6–12)
POTASSIUM SERPL-SCNC: 4.1 MMOL/L (ref 3.5–5.2)
PROT SERPL-MCNC: 7.7 G/DL (ref 6–8.5)
RBC # BLD AUTO: 4.64 10*6/MM3 (ref 3.77–5.28)
SODIUM SERPL-SCNC: 140 MMOL/L (ref 136–145)
WBC NRBC COR # BLD AUTO: 7.96 10*3/MM3 (ref 3.4–10.8)

## 2024-01-15 PROCEDURE — 84479 ASSAY OF THYROID (T3 OR T4): CPT

## 2024-01-15 PROCEDURE — 99214 OFFICE O/P EST MOD 30 MIN: CPT | Performed by: CLINICAL NURSE SPECIALIST

## 2024-01-15 PROCEDURE — 36415 COLL VENOUS BLD VENIPUNCTURE: CPT

## 2024-01-15 PROCEDURE — 86140 C-REACTIVE PROTEIN: CPT

## 2024-01-15 PROCEDURE — 83690 ASSAY OF LIPASE: CPT | Performed by: CLINICAL NURSE SPECIALIST

## 2024-01-15 PROCEDURE — 84436 ASSAY OF TOTAL THYROXINE: CPT

## 2024-01-15 PROCEDURE — 80050 GENERAL HEALTH PANEL: CPT | Performed by: CLINICAL NURSE SPECIALIST

## 2024-01-15 PROCEDURE — 85652 RBC SED RATE AUTOMATED: CPT

## 2024-01-15 RX ORDER — PANTOPRAZOLE SODIUM 40 MG/1
40 TABLET, DELAYED RELEASE ORAL DAILY
Qty: 30 TABLET | Refills: 11 | Status: SHIPPED | OUTPATIENT
Start: 2024-01-15

## 2024-01-15 NOTE — PROGRESS NOTES
Yesi Bowman  1970    1/15/2024  Chief Complaint   Patient presents with    GI Problem     Hiatal hernia     Subjective   HPI  Yesi Bowman is a 53 y.o. female who presents with a complaint of epigastric pain that is ongoing persistent with nausea. She says it is a soreness and when she coughs it is more severe. She says no certain triggers. She rates her pain a 2 out of 10. She has had an endoscopy in March and she was noted to have a hiatal hernia. She is not taking her Protonix at this time.   She is drinking cokes several per day. No NSAIDS. She says she is not eating and is going through a divorce.   She says that she does not have an appetite. She was over 200lbs and today she is 140 lbs. She has lost approx 70 lbs she says over unknown time. She has lost 12 lbs since 2023.     No rectal bleeding. No change in bowels. No fever chills or sweats. No family hx for colon cancer.   She is currently clean of substances 7 years now and sees her sponsor regularly.  She was diagnosed with cervical cancer in  and CT PET from 3/6/23 showed no abnormal hypermetabolic activity in the neck, chest abdomen or pelvis.    Endoscopy 3/16/23 for weight loss and nausea by Dr Gonzalez  - Normal examined duodenum. Biopsied.  - Hiatal hernia.  - Normal stomach. Biopsied.  - Z-line regular, 35 cm from the incisors.  Past Medical History:   Diagnosis Date    Alcoholism     Anemia     Cervical cancer     Depression     Drug therapy     History of radiation therapy     5040 cGy to cervix + 3000 cGy (HDRs x 5)    History of transfusion     Hx of colonic polyps     Hx of radiation therapy     Nausea and vomiting 2018    Substance abuse      Past Surgical History:   Procedure Laterality Date    APPENDECTOMY      CERVICAL BIOPSY       SECTION      CHOLECYSTECTOMY WITH INTRAOPERATIVE CHOLANGIOGRAM N/A 2018    Procedure: CHOLECYSTECTOMY LAPAROSCOPIC ;  Surgeon: Karla Lee MD;  Location: [facility] OR;   Service:     COLONOSCOPY  2016    Hyperplastic polyp at 20 cm repeat exam in 5 years    COLONOSCOPY N/A 2019    Tubular adenoma ascending colon, Hyperplastic polyp at 50 cm, tics repeat exam in 5 years    CYSTOSCOPY      Diagnostic    DILATATION AND CURETTAGE      ENDOSCOPY N/A 2019    Normal exam    ENDOSCOPY N/A 2023    Normal examined duodenum. Biopsied. - Hiatal hernia. - Normal stomach. Biopsied. -  Small bowel, biopsies: Histologically normal duodenal mucosa. Prominent Brunner's glands.    HYSTERECTOMY      KNEE SURGERY      OOPHORECTOMY      PELVIC EXAMINATION UNDER ANESTHESIA      TONSILLECTOMY      TOTAL LAPAROSCOPIC HYSTERECTOMY WITH DAVINCI ROBOT         Outpatient Medications Marked as Taking for the 1/15/24 encounter (Office Visit) with Hiral Schneider APRN   Medication Sig Dispense Refill    cholecalciferol (VITAMIN D3) 1000 units tablet Take 1 tablet by mouth Daily.      ondansetron ODT (ZOFRAN ODT) 4 MG disintegrating tablet Take 1 tablet by mouth Every 8 (Eight) Hours As Needed for Nausea. 20 tablet 0    pantoprazole (PROTONIX) 40 MG EC tablet Take 1 tablet by mouth Daily.      traZODone (DESYREL) 150 MG tablet Take 50 mg by mouth Every Night.      Trintellix 10 MG tablet tablet        No Known Allergies  Social History     Socioeconomic History    Marital status:    Tobacco Use    Smoking status: Some Days     Packs/day: .5     Types: Cigarettes     Last attempt to quit: 2016     Years since quittin.0    Smokeless tobacco: Never   Vaping Use    Vaping Use: Never used   Substance and Sexual Activity    Alcohol use: No    Drug use: No    Sexual activity: Not Currently     Partners: Male     Birth control/protection: Surgical     Comment: Hysterectomy     Family History   Problem Relation Age of Onset    Skin cancer Mother     Cancer Maternal Grandmother         Malignancy    Cancer Paternal Grandmother         Malignancy    Colon cancer Neg Hx     Colon  "polyps Neg Hx      Health Maintenance   Topic Date Due    Pneumococcal Vaccine 0-64 (1 of 2 - PCV) Never done    TDAP/TD VACCINES (1 - Tdap) Never done    HEPATITIS C SCREENING  Never done    ANNUAL PHYSICAL  Never done    MAMMOGRAM  12/28/2018    ZOSTER VACCINE (1 of 2) Never done    INFLUENZA VACCINE  Never done    COVID-19 Vaccine (3 - 2023-24 season) 09/01/2023    COLORECTAL CANCER SCREENING  11/21/2024     Review of Systems   Constitutional:  Negative for activity change, appetite change, chills, diaphoresis, fatigue, fever and unexpected weight change.   HENT:  Negative for ear pain, hearing loss, mouth sores, sore throat, trouble swallowing and voice change.    Eyes: Negative.    Respiratory:  Negative for cough, choking, shortness of breath and wheezing.    Cardiovascular:  Negative for chest pain and palpitations.   Gastrointestinal:  Positive for abdominal pain and nausea. Negative for blood in stool, constipation, diarrhea and vomiting.   Endocrine: Negative for cold intolerance and heat intolerance.   Genitourinary:  Negative for decreased urine volume, dysuria, frequency, hematuria and urgency.   Musculoskeletal:  Negative for back pain, gait problem and myalgias.   Skin:  Negative for color change, pallor and rash.   Allergic/Immunologic: Negative for food allergies and immunocompromised state.   Neurological:  Negative for dizziness, tremors, seizures, syncope, weakness, light-headedness, numbness and headaches.   Hematological:  Negative for adenopathy. Does not bruise/bleed easily.   Psychiatric/Behavioral:  Negative for agitation and confusion. The patient is not nervous/anxious.    All other systems reviewed and are negative.    Objective   Vitals:    01/15/24 1040   BP: 130/84   BP Location: Left arm   Pulse: 73   Temp: 95 °F (35 °C)   TempSrc: Temporal   SpO2: 97%   Weight: 63.5 kg (140 lb)   Height: 172.7 cm (67.99\")     Body mass index is 21.29 kg/m².  Physical Exam  Constitutional:       " Appearance: She is well-developed.   HENT:      Head: Normocephalic and atraumatic.   Eyes:      Pupils: Pupils are equal, round, and reactive to light.   Neck:      Trachea: No tracheal deviation.   Cardiovascular:      Rate and Rhythm: Normal rate and regular rhythm.      Heart sounds: Normal heart sounds. No murmur heard.     No friction rub. No gallop.   Pulmonary:      Effort: Pulmonary effort is normal. No respiratory distress.      Breath sounds: Normal breath sounds. No wheezing or rales.   Chest:      Chest wall: No tenderness.   Abdominal:      General: Bowel sounds are normal. There is no distension.      Palpations: Abdomen is soft. Abdomen is not rigid.      Tenderness: There is abdominal tenderness. There is no guarding or rebound.   Musculoskeletal:         General: No tenderness or deformity. Normal range of motion.      Cervical back: Normal range of motion and neck supple.   Skin:     General: Skin is warm and dry.      Coloration: Skin is not pale.      Findings: No rash.   Neurological:      Mental Status: She is alert and oriented to person, place, and time.      Deep Tendon Reflexes: Reflexes are normal and symmetric.   Psychiatric:         Behavior: Behavior normal.         Thought Content: Thought content normal.         Judgment: Judgment normal.       Assessment & Plan   Diagnoses and all orders for this visit:    1. Epigastric pain (Primary)  -     CBC & Differential  -     Comprehensive Metabolic Panel  -     Lipase  -     Sedimentation Rate; Future  -     C-reactive Protein; Future    2. Weight loss  -     Thyroid Panel With TSH; Future    3. Hiatal hernia  -     FL Upper GI With Air Contrast; Future    4. Tobacco abuse counseling    5. Hx of adenomatous colonic polyps    6. Nausea    Other orders  -     pantoprazole (PROTONIX) 40 MG EC tablet; Take 1 tablet by mouth Daily.  Dispense: 30 tablet; Refill: 11    No hx for pancreatitis   Will get labs today  Went over CT PET and Endoscopy  from march 2023 with these symptoms. She has been off of her Protonix and I did advise her to start taking and cut back on the diet cokes. She is basically drinking those and not eating any foods. Will get upper GI to further evaluate.  I discussed non pharmaceutical treatment of gerd.  This includes gradually losing weight to achieve ideal body wt., elevation of the head of bed by 4-6 inches, nothing to eat or drink 3 hours prior to lying down, avoiding tight clothing, stress reduction, tobacco cessation, reduction of alcohol intake, and dietary restrictions (avoiding caffeine, coffee, fatty foods, mints, chocolate, spicy foods and tomato based sauces as much as possible).      Part of this note may be an electronic transcription/translation of spoken language to printed text using the Dragon Dictation System.  Body mass index is 21.29 kg/m².  No follow-ups on file.  There are no Patient Instructions on file for this visit.  BMI is within normal parameters. No other follow-up for BMI required.      All risks, benefits, alternatives, and indications of colonoscopy and/or Endoscopy procedure have been discussed with the patient. Risks to include perforation of the colon requiring possible surgery or colostomy, risk of bleeding from biopsies or removal of colon tissue, possibility of missing a colon polyp or cancer, or adverse drug reaction.  Benefits to include the diagnosis and management of disease of the colon and rectum. Alternatives to include barium enema, radiographic evaluation, lab testing or no intervention. Pt verbalizes understanding and agrees.     Hiral Schneider, APRN  1/15/2024  11:07 CST          If you smoke or use tobacco, 4 minutes reading provided  Steps to Quit Smoking  Smoking tobacco can be harmful to your health and can affect almost every organ in your body. Smoking puts you, and those around you, at risk for developing many serious chronic diseases. Quitting smoking is difficult, but it  is one of the best things that you can do for your health. It is never too late to quit.  What are the benefits of quitting smoking?  When you quit smoking, you lower your risk of developing serious diseases and conditions, such as:  Lung cancer or lung disease, such as COPD.  Heart disease.  Stroke.  Heart attack.  Infertility.  Osteoporosis and bone fractures.  Additionally, symptoms such as coughing, wheezing, and shortness of breath may get better when you quit. You may also find that you get sick less often because your body is stronger at fighting off colds and infections. If you are pregnant, quitting smoking can help to reduce your chances of having a baby of low birth weight.  How do I get ready to quit?  When you decide to quit smoking, create a plan to make sure that you are successful. Before you quit:  Pick a date to quit. Set a date within the next two weeks to give you time to prepare.  Write down the reasons why you are quitting. Keep this list in places where you will see it often, such as on your bathroom mirror or in your car or wallet.  Identify the people, places, things, and activities that make you want to smoke (triggers) and avoid them. Make sure to take these actions:  Throw away all cigarettes at home, at work, and in your car.  Throw away smoking accessories, such as ashtrays and lighters.  Clean your car and make sure to empty the ashtray.  Clean your home, including curtains and carpets.  Tell your family, friends, and coworkers that you are quitting. Support from your loved ones can make quitting easier.  Talk with your health care provider about your options for quitting smoking.  Find out what treatment options are covered by your health insurance.  What strategies can I use to quit smoking?  Talk with your healthcare provider about different strategies to quit smoking. Some strategies include:  Quitting smoking altogether instead of gradually lessening how much you smoke over a  period of time. Research shows that quitting “cold turkey” is more successful than gradually quitting.  Attending in-person counseling to help you build problem-solving skills. You are more likely to have success in quitting if you attend several counseling sessions. Even short sessions of 10 minutes can be effective.  Finding resources and support systems that can help you to quit smoking and remain smoke-free after you quit. These resources are most helpful when you use them often. They can include:  Online chats with a counselor.  Telephone quitlines.  Printed self-help materials.  Support groups or group counseling.  Text messaging programs.  Mobile phone applications.  Taking medicines to help you quit smoking. (If you are pregnant or breastfeeding, talk with your health care provider first.) Some medicines contain nicotine and some do not. Both types of medicines help with cravings, but the medicines that include nicotine help to relieve withdrawal symptoms. Your health care provider may recommend:  Nicotine patches, gum, or lozenges.  Nicotine inhalers or sprays.  Non-nicotine medicine that is taken by mouth.  Talk with your health care provider about combining strategies, such as taking medicines while you are also receiving in-person counseling. Using these two strategies together makes you more likely to succeed in quitting than if you used either strategy on its own.  If you are pregnant or breastfeeding, talk with your health care provider about finding counseling or other support strategies to quit smoking. Do not take medicine to help you quit smoking unless told to do so by your health care provider.  What things can I do to make it easier to quit?  Quitting smoking might feel overwhelming at first, but there is a lot that you can do to make it easier. Take these important actions:  Reach out to your family and friends and ask that they support and encourage you during this time. Call telephone  quitlines, reach out to support groups, or work with a counselor for support.  Ask people who smoke to avoid smoking around you.  Avoid places that trigger you to smoke, such as bars, parties, or smoke-break areas at work.  Spend time around people who do not smoke.  Lessen stress in your life, because stress can be a smoking trigger for some people. To lessen stress, try:  Exercising regularly.  Deep-breathing exercises.  Yoga.  Meditating.  Performing a body scan. This involves closing your eyes, scanning your body from head to toe, and noticing which parts of your body are particularly tense. Purposefully relax the muscles in those areas.  Download or purchase mobile phone or tablet apps (applications) that can help you stick to your quit plan by providing reminders, tips, and encouragement. There are many free apps, such as QuitGuide from the CDC (Centers for Disease Control and Prevention). You can find other support for quitting smoking (smoking cessation) through smokefree.gov and other websites.  How will I feel when I quit smoking?  Within the first 24 hours of quitting smoking, you may start to feel some withdrawal symptoms. These symptoms are usually most noticeable 2-3 days after quitting, but they usually do not last beyond 2-3 weeks. Changes or symptoms that you might experience include:  Mood swings.  Restlessness, anxiety, or irritation.  Difficulty concentrating.  Dizziness.  Strong cravings for sugary foods in addition to nicotine.  Mild weight gain.  Constipation.  Nausea.  Coughing or a sore throat.  Changes in how your medicines work in your body.  A depressed mood.  Difficulty sleeping (insomnia).  After the first 2-3 weeks of quitting, you may start to notice more positive results, such as:  Improved sense of smell and taste.  Decreased coughing and sore throat.  Slower heart rate.  Lower blood pressure.  Clearer skin.  The ability to breathe more easily.  Fewer sick days.  Quitting smoking is  very challenging for most people. Do not get discouraged if you are not successful the first time. Some people need to make many attempts to quit before they achieve long-term success. Do your best to stick to your quit plan, and talk with your health care provider if you have any questions or concerns.  This information is not intended to replace advice given to you by your health care provider. Make sure you discuss any questions you have with your health care provider.  Document Released: 12/12/2002 Document Revised: 08/15/2017 Document Reviewed: 05/03/2016  [employer] Interactive Patient Education © 2017 [employer]

## 2024-01-16 LAB
T-UPTAKE NFR SERPL: 1.16 TBI (ref 0.8–1.3)
T4 SERPL-MCNC: 7.3 MCG/DL (ref 4.5–11.7)
TSH SERPL DL<=0.05 MIU/L-ACNC: 0.95 UIU/ML (ref 0.27–4.2)

## 2024-01-25 ENCOUNTER — HOSPITAL ENCOUNTER (OUTPATIENT)
Dept: GENERAL RADIOLOGY | Facility: HOSPITAL | Age: 54
Discharge: HOME | End: 2024-01-25
Admitting: CLINICAL NURSE SPECIALIST
Payer: COMMERCIAL

## 2024-01-25 DIAGNOSIS — K44.9 HIATAL HERNIA: ICD-10-CM

## 2024-01-25 DIAGNOSIS — R10.13 EPIGASTRIC PAIN: Primary | ICD-10-CM

## 2024-01-25 PROCEDURE — 74246 X-RAY XM UPR GI TRC 2CNTRST: CPT

## 2024-01-25 RX ADMIN — BARIUM SULFATE 120 ML: 980 POWDER, FOR SUSPENSION ORAL at 08:24

## 2024-01-25 RX ADMIN — BARIUM SULFATE 120 ML: 960 POWDER, FOR SUSPENSION ORAL at 08:24

## 2024-01-25 RX ADMIN — BARIUM SULFATE 700 MG: 700 TABLET ORAL at 08:24

## 2024-07-22 ENCOUNTER — OFFICE VISIT (OUTPATIENT)
Dept: ONCOLOGY | Facility: CLINIC | Age: 54
End: 2024-07-22
Payer: COMMERCIAL

## 2024-07-22 ENCOUNTER — LAB (OUTPATIENT)
Dept: LAB | Facility: HOSPITAL | Age: 54
End: 2024-07-22
Payer: COMMERCIAL

## 2024-07-22 VITALS
BODY MASS INDEX: 22.13 KG/M2 | OXYGEN SATURATION: 97 % | RESPIRATION RATE: 16 BRPM | WEIGHT: 146 LBS | TEMPERATURE: 97.5 F | DIASTOLIC BLOOD PRESSURE: 76 MMHG | HEART RATE: 84 BPM | HEIGHT: 68 IN | SYSTOLIC BLOOD PRESSURE: 118 MMHG

## 2024-07-22 DIAGNOSIS — Z85.41 HISTORY OF CERVICAL CANCER: Primary | ICD-10-CM

## 2024-07-22 DIAGNOSIS — Z72.0 TOBACCO USE: ICD-10-CM

## 2024-07-22 DIAGNOSIS — F32.A DEPRESSION, UNSPECIFIED DEPRESSION TYPE: ICD-10-CM

## 2024-07-22 DIAGNOSIS — D64.9 ANEMIA, UNSPECIFIED TYPE: ICD-10-CM

## 2024-07-22 DIAGNOSIS — Z85.41 HX OF CERVICAL CANCER: Primary | ICD-10-CM

## 2024-07-22 LAB
ALBUMIN SERPL-MCNC: 4 G/DL (ref 3.5–5.2)
ALBUMIN/GLOB SERPL: 1.7 G/DL
ALP SERPL-CCNC: 55 U/L (ref 39–117)
ALT SERPL W P-5'-P-CCNC: 8 U/L (ref 1–33)
ANION GAP SERPL CALCULATED.3IONS-SCNC: 8 MMOL/L (ref 5–15)
AST SERPL-CCNC: 11 U/L (ref 1–32)
BASOPHILS # BLD AUTO: 0.05 10*3/MM3 (ref 0–0.2)
BASOPHILS NFR BLD AUTO: 1 % (ref 0–1.5)
BILIRUB SERPL-MCNC: 0.2 MG/DL (ref 0–1.2)
BUN SERPL-MCNC: 14 MG/DL (ref 6–20)
BUN/CREAT SERPL: 22.6 (ref 7–25)
CALCIUM SPEC-SCNC: 9.6 MG/DL (ref 8.6–10.5)
CHLORIDE SERPL-SCNC: 106 MMOL/L (ref 98–107)
CO2 SERPL-SCNC: 27 MMOL/L (ref 22–29)
CREAT SERPL-MCNC: 0.62 MG/DL (ref 0.57–1)
DEPRECATED RDW RBC AUTO: 49.2 FL (ref 37–54)
EGFRCR SERPLBLD CKD-EPI 2021: 108 ML/MIN/1.73
EOSINOPHIL # BLD AUTO: 0.1 10*3/MM3 (ref 0–0.4)
EOSINOPHIL NFR BLD AUTO: 2 % (ref 0.3–6.2)
ERYTHROCYTE [DISTWIDTH] IN BLOOD BY AUTOMATED COUNT: 14 % (ref 12.3–15.4)
FERRITIN SERPL-MCNC: 81.37 NG/ML (ref 13–150)
GLOBULIN UR ELPH-MCNC: 2.4 GM/DL
GLUCOSE SERPL-MCNC: 87 MG/DL (ref 65–99)
HCT VFR BLD AUTO: 37.4 % (ref 34–46.6)
HGB BLD-MCNC: 11.8 G/DL (ref 12–15.9)
HOLD SPECIMEN: NORMAL
IMM GRANULOCYTES # BLD AUTO: 0.01 10*3/MM3 (ref 0–0.05)
IMM GRANULOCYTES NFR BLD AUTO: 0.2 % (ref 0–0.5)
IRON 24H UR-MRATE: 60 MCG/DL (ref 37–145)
IRON SATN MFR SERPL: 17 % (ref 20–50)
LYMPHOCYTES # BLD AUTO: 1.24 10*3/MM3 (ref 0.7–3.1)
LYMPHOCYTES NFR BLD AUTO: 24.7 % (ref 19.6–45.3)
MCH RBC QN AUTO: 30.2 PG (ref 26.6–33)
MCHC RBC AUTO-ENTMCNC: 31.6 G/DL (ref 31.5–35.7)
MCV RBC AUTO: 95.7 FL (ref 79–97)
MONOCYTES # BLD AUTO: 0.45 10*3/MM3 (ref 0.1–0.9)
MONOCYTES NFR BLD AUTO: 9 % (ref 5–12)
NEUTROPHILS NFR BLD AUTO: 3.17 10*3/MM3 (ref 1.7–7)
NEUTROPHILS NFR BLD AUTO: 63.1 % (ref 42.7–76)
NRBC BLD AUTO-RTO: 0 /100 WBC (ref 0–0.2)
PLATELET # BLD AUTO: 180 10*3/MM3 (ref 140–450)
PMV BLD AUTO: 9.2 FL (ref 6–12)
POTASSIUM SERPL-SCNC: 4.3 MMOL/L (ref 3.5–5.2)
PROT SERPL-MCNC: 6.4 G/DL (ref 6–8.5)
RBC # BLD AUTO: 3.91 10*6/MM3 (ref 3.77–5.28)
SODIUM SERPL-SCNC: 141 MMOL/L (ref 136–145)
TIBC SERPL-MCNC: 361 MCG/DL (ref 298–536)
TRANSFERRIN SERPL-MCNC: 242 MG/DL (ref 200–360)
WBC NRBC COR # BLD AUTO: 5.02 10*3/MM3 (ref 3.4–10.8)

## 2024-07-22 PROCEDURE — 36415 COLL VENOUS BLD VENIPUNCTURE: CPT

## 2024-07-22 PROCEDURE — 84466 ASSAY OF TRANSFERRIN: CPT

## 2024-07-22 PROCEDURE — 99214 OFFICE O/P EST MOD 30 MIN: CPT | Performed by: NURSE PRACTITIONER

## 2024-07-22 PROCEDURE — 85025 COMPLETE CBC W/AUTO DIFF WBC: CPT

## 2024-07-22 PROCEDURE — 83540 ASSAY OF IRON: CPT

## 2024-07-22 PROCEDURE — 80053 COMPREHEN METABOLIC PANEL: CPT

## 2024-07-22 PROCEDURE — 82728 ASSAY OF FERRITIN: CPT

## 2024-07-22 NOTE — PROGRESS NOTES
[facility] ON[facility] HEMATOLOGY & ONCOLOGY [place] [id number] [facility] [address]  [facility] [address] [phone]    Patient Name: Yesi Bowman  Encounter Date: 07/22/2024   YOB: 1970  Patient Number: [id number]    Hematology / Oncology Progress Note      HPI / REASON FOR VISIT: Yesi Bowman is a 53 y.o. female who is followed by this office for history of cervical cancer.  She was admitted in 2013 to the hospital after having profuse vaginal bleeding.  She was evaluated by Dr. Hernandez on 2/18/2013 was found to have a large necrotic mass in the cervix that was consistent with invasive Endo cervical adenocarcinoma, well-differentiated without lymphovascular and perineural invasion.  CAT scan of the abdomen pelvis was performed again showing a large necrotic appearing mass at the cervix measuring 7.2 cm.  CAT scan of the chest that showed no evidence of disease.       She was then referred to the [facility], gynecologic oncology service on 12/19/2013.  Patient underwent a repeat biopsy that confirmed the histology.  She then underwent a PET scan on 12/20/2013 finding diffusely enlarged cervix with no findings of local regional metastatic disease.  She also underwent a colonoscopy in January 2014 that found no evidence of high-grade dysplasia.       She then underwent a radical robotic hysterectomy and bilateral pelvic lymphadenectomy and cystoscopy on 2/11/2014.  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  The tumor was well-differentiated.  It measured 4 x 3.5 x 2 cm and stromal invasion was involving more than half the wall thickness.  Angiolymphatic invasion was present.  Vaginal cuff margin was focally positive.  1 of 5 left pelvic lymph nodes was also positive.  1 of 9 right pelvic lymph nodes were positive.  Her AJCC TNM stage was a [id number], stage IIIb disease.  She then proceeded with concurrent  chemoradiation with weekly cisplatin and radiation on 4/2/2014.  She underwent internal radiation therapy as well as hyperbaric treatments for healing.    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    She had EGD 3/16/23:  Normal examined duodenum. Biopsied.   Hiatal hernia.   Normal stomach. Biopsied.   Z-line regular, 35 cm from the incisors.    INTERVAL HISTORY   Ms. Bowman presents to clinic today for continued follow up.    She feels like she may be going through menopause because her moods are changing which is causing issues in her marriage.  She also reports she has been out of Trintellix for approx 1 month.     She had labs drawn today and results were reviewed with him in the office       LABS    Lab Results - Last 18 Months   Lab Units 07/22/24  1434 01/15/24  1126 08/04/23  0734 03/09/23  0839   HEMOGLOBIN g/dL 11.8* 13.8 13.4 13.5   HEMATOCRIT % 37.4 44.8 43.4 43.0   MCV fL 95.7 96.6 98.6* 94.5   WBC 10*3/mm3 5.02 7.96 6.19 5.47   RDW % 14.0 13.2 13.2 13.2   MPV fL 9.2 9.2 9.2 9.5   PLATELETS 10*3/mm3 180 257 223 219   IMM GRAN % % 0.2 0.3 0.2 0.2   NEUTROS ABS 10*3/mm3 3.17 5.65 3.60 3.18   LYMPHS ABS 10*3/mm3 1.24 1.72 1.87 1.74   MONOS ABS 10*3/mm3 0.45 0.42 0.48 0.37   EOS ABS 10*3/mm3 0.10 0.10 0.18 0.14   BASOS ABS 10*3/mm3 0.05 0.05 0.05 0.03   IMMATURE GRANS (ABS) 10*3/mm3 0.01 0.02 0.01 0.01   NRBC /100 WBC 0.0 0.0 0.0 0.0       Lab Results - Last 18 Months   Lab Units 07/22/24  1434 01/15/24  1126 08/04/23  0734 03/09/23  0839   GLUCOSE mg/dL 87 97 104* 132*   SODIUM mmol/L 141 140 136 142   POTASSIUM mmol/L 4.3 4.1 4.2 4.1   CO2 mmol/L 27.0 31.0* 27.0 29.0   CHLORIDE mmol/L 106 102 102 104   ANION GAP mmol/L 8.0 7.0 7.0 9.0   CREATININE mg/dL 0.62 0.64 0.68 0.69   BUN mg/dL 14 10 9 7   BUN / CREAT RATIO  22.6 15.6 13.2 10.1   CALCIUM mg/dL 9.6 10.7* 9.7 10.0   ALK PHOS U/L 55 65 69 58   TOTAL  "PROTEIN g/dL 6.4 7.7 6.9 7.4   ALT (SGPT) U/L 8 8 9 7   AST (SGOT) U/L 11 12 15 12   BILIRUBIN mg/dL 0.2 0.3 0.2 0.3   ALBUMIN g/dL 4.0 4.9 4.4 4.8   GLOBULIN gm/dL 2.4 2.8 2.5 2.6       No results for input(s): \"MSPIKE\", \"KAPPALAMB\", \"IGLFLC\", \"URICACID\", \"FREEKAPPAL\", \"CEA\", \"LDH\", \"REFLABREPO\" in the last 66257 hours.    Lab Results - Last 18 Months   Lab Units 07/22/24  1434 01/15/24  1126 03/09/23  0839   IRON mcg/dL 60  --   --    TIBC mcg/dL 361  --   --    IRON SATURATION (TSAT) % 17*  --   --    FERRITIN ng/mL 81.37  --   --    T4 TOTAL mcg/dL  --  7.30  --    TSH uIU/mL  --  0.954 0.591   FOLATE ng/mL  --   --  9.57         PAST MEDICAL HISTORY:  ALLERGIES:  No Known Allergies  CURRENT MEDICATIONS:  Outpatient Encounter Medications as of 7/22/2024   Medication Sig Dispense Refill    ondansetron ODT (ZOFRAN ODT) 4 MG disintegrating tablet Take 1 tablet by mouth Every 8 (Eight) Hours As Needed for Nausea. 20 tablet 0    pantoprazole (PROTONIX) 40 MG EC tablet Take 1 tablet by mouth Daily. 30 tablet 11    traZODone (DESYREL) 150 MG tablet Take 50 mg by mouth Every Night.      Trintellix 10 MG tablet tablet       [DISCONTINUED] pantoprazole (PROTONIX) 40 MG EC tablet Take 1 tablet by mouth Daily.      [DISCONTINUED] cholecalciferol (VITAMIN D3) 1000 units tablet Take 1 tablet by mouth Daily.       No facility-administered encounter medications on file as of 7/22/2024.     ADULT ILLNESSES:  Patient Active Problem List   Diagnosis Code    Cervical cancer C53.9    History of radiation therapy Z92.3    Former smoker Z87.891    Encounter for follow-up surveillance of cervical cancer Z08, Z85.41    Generalized bloating R14.0    Pap smear of vagina with previous hysterectomy for cancer Z08, Z90.710    Hx of cervical cancer Z85.41    Diarrhea R19.7    Bloating R14.0    Generalized abdominal pain R10.84    Nausea and vomiting R11.2    Obesity, unspecified obesity severity, unspecified obesity type E66.9    Nonsmoker " Z78.9    Papanicolaou smear Z12.4    Ear fullness, bilateral H93.8X3    Periumbilical abdominal pain R10.33    Hyperplastic colonic polyp K63.5    Dyspepsia R10.13     SURGERIES:  Past Surgical History:   Procedure Laterality Date    APPENDECTOMY      CERVICAL BIOPSY       SECTION      CHOLECYSTECTOMY WITH INTRAOPERATIVE CHOLANGIOGRAM N/A 2018    Procedure: CHOLECYSTECTOMY LAPAROSCOPIC ;  Surgeon: Karla Lee MD;  Location[facility];  Service:     COLONOSCOPY  2016    Hyperplastic polyp at 20 cm repeat exam in 5 years    COLONOSCOPY N/A 2019    Tubular adenoma ascending colon, Hyperplastic polyp at 50 cm, tics repeat exam in 5 years    CYSTOSCOPY      Diagnostic    DILATATION AND CURETTAGE      ENDOSCOPY N/A 2019    Normal exam    ENDOSCOPY N/A 2023    Normal examined duodenum. Biopsied. - Hiatal hernia. - Normal stomach. Biopsied. -  Small bowel, biopsies: Histologically normal duodenal mucosa. Prominent Brunner's glands.    HYSTERECTOMY      KNEE SURGERY      OOPHORECTOMY      PELVIC EXAMINATION UNDER ANESTHESIA      TONSILLECTOMY      TOTAL LAPAROSCOPIC HYSTERECTOMY WITH DAVINCI ROBOT       HEALTH MAINTENANCE ITEMS:  Health Maintenance Due   Topic Date Due    Pneumococcal Vaccine 0-64 (1 of 2 - PCV) Never done    TDAP/TD VACCINES (1 - Tdap) Never done    HEPATITIS C SCREENING  Never done    ANNUAL PHYSICAL  Never done    MAMMOGRAM  2018    ZOSTER VACCINE (1 of 2) Never done    COVID-19 Vaccine (3 - 2023-24 season) 2023    COLORECTAL CANCER SCREENING  2024       <no information>  Last Completed Colonoscopy       This patient has no relevant Health Maintenance data.          Immunization History   Administered Date(s) Administered    COVID-19 (MODERNA) 1st,2nd,3rd Dose Monovalent 2021, 2021     Last Completed Mammogram       This patient has no relevant Health Maintenance data.              FAMILY HISTORY:  Family History   Problem Relation  "Age of Onset    Skin cancer Mother     Cancer Maternal Grandmother         Malignancy    Cancer Paternal Grandmother         Malignancy    Colon cancer Neg Hx     Colon polyps Neg Hx      SOCIAL HISTORY:  Social History     Socioeconomic History    Marital status:    Tobacco Use    Smoking status: Some Days     Current packs/day: 0.00     Types: Cigarettes     Last attempt to quit: 2016     Years since quittin.5    Smokeless tobacco: Never   Vaping Use    Vaping status: Never Used   Substance and Sexual Activity    Alcohol use: No    Drug use: No    Sexual activity: Not Currently     Partners: Male     Birth control/protection: Surgical     Comment: Hysterectomy       REVIEW OF SYSTEMS:  Review of Systems   Constitutional:  Positive for fatigue and unexpected weight loss. Negative for fever.   HENT:  Negative for trouble swallowing.    Respiratory:  Negative for cough and shortness of breath.    Cardiovascular:  Negative for chest pain, palpitations and leg swelling.   Gastrointestinal:  Positive for diarrhea, nausea and vomiting.   Genitourinary:  Negative for hematuria.   Musculoskeletal:  Negative for arthralgias and myalgias.   Skin:  Negative for rash, skin lesions and wound.   Neurological:  Negative for dizziness, syncope, memory problem and confusion.   Psychiatric/Behavioral:  Negative for suicidal ideas and depressed mood. The patient is not nervous/anxious.        /76   Pulse 84   Temp 97.5 °F (36.4 °C)   Resp 16   Ht 172.7 cm (68\")   Wt 66.2 kg (146 lb)   LMP  (LMP Unknown)   SpO2 97%   BMI 22.20 kg/m²  Body surface area is 1.79 meters squared.    Pain Score    24 1449   PainSc: 0-No pain         Physical Exam  Constitutional:       Appearance: Normal appearance.   HENT:      Head: Normocephalic and atraumatic.   Cardiovascular:      Rate and Rhythm: Normal rate and regular rhythm.   Pulmonary:      Effort: Pulmonary effort is normal.      Breath sounds: Normal " breath sounds.   Abdominal:      General: Bowel sounds are normal.      Palpations: Abdomen is soft.   Musculoskeletal:      Right lower leg: No edema.      Left lower leg: No edema.   Skin:     General: Skin is warm and dry.   Neurological:      Mental Status: She is alert and oriented to person, place, and time.   Psychiatric:         Attention and Perception: Attention normal.         Mood and Affect: Mood normal.         Judgment: Judgment normal.         Yesi Bowman reports a pain score of 0.  Given her pain assessment as noted, treatment options were discussed and the following options were decided upon as a follow-up plan to address the patient's pain:  No intervention indicated  .           ASSESSMENT / PLAN    1. Hx of cervical cancer    2. Anemia, unspecified type    3. Depression, unspecified depression type    4. Tobacco use           ASSESSMENT:      1. History Cervical Carcinoma diagnosed on 12/18/13  Staging: AJCC TNM [id number], Stage IIIB  Treatment: Radical Robotic hysterectomy and bilateral pelvic lymphadenopathy at [facility]. Adjuvant radiation therapy to equal 5040 cGy in 28 fractions concurrent with weekly Cisplatin.  Disease status: No evidence of disease  -PET Scan 03/06/23 No abnormal hypermetabolic activity in the neck, chest, abdomen, or pelvis.   -No new findings to suggest recurrence     2.  Anemia   -Labs today:  Hgb 11.8, Hct 37.4  -Will check iron profile and ferritin       3.  Depression   -PHQ9 today is 0  -She reports mood swings   -Pt has been off of Trintellix for approx 1 month r/t PA.   -She states she will stop by Dr. Cummings's office today.        4.  Tobacco Use   -Smokes 2-3 cigarettes per day more on the weekend  -Importance of Smoking Cessation discussed with patient and informed patient additional information will be on today's AVS.       She hadn't had mammogram since 2016.  It has been ordered but she hasn't had it yet. She did have PET scan 03/06/23    PLAN:  Stable for  observation.    Return to office 1 year   Patient will have preoffice labs CBC, CMP  Continue current medications, treatment plans and follow up with PCP and any other providers.  Care discussed with patient.  Understanding expressed.  Patient agreeable with plan.        Annabella Kevin, APRN  07/22/2024

## 2025-02-21 RX ORDER — PANTOPRAZOLE SODIUM 40 MG/1
40 TABLET, DELAYED RELEASE ORAL DAILY
Qty: 30 TABLET | Refills: 12 | OUTPATIENT
Start: 2025-02-21

## 2025-04-14 ENCOUNTER — OFFICE VISIT (OUTPATIENT)
Dept: PRIMARY CARE CLINIC | Age: 55
End: 2025-04-14
Payer: COMMERCIAL

## 2025-04-14 VITALS
OXYGEN SATURATION: 98 % | RESPIRATION RATE: 16 BRPM | DIASTOLIC BLOOD PRESSURE: 70 MMHG | HEART RATE: 95 BPM | TEMPERATURE: 98.4 F | BODY MASS INDEX: 23.11 KG/M2 | SYSTOLIC BLOOD PRESSURE: 120 MMHG | WEIGHT: 152 LBS

## 2025-04-14 DIAGNOSIS — L25.9 CONTACT DERMATITIS, UNSPECIFIED CONTACT DERMATITIS TYPE, UNSPECIFIED TRIGGER: Primary | ICD-10-CM

## 2025-04-14 PROCEDURE — 96372 THER/PROPH/DIAG INJ SC/IM: CPT | Performed by: NURSE PRACTITIONER

## 2025-04-14 PROCEDURE — 99213 OFFICE O/P EST LOW 20 MIN: CPT | Performed by: NURSE PRACTITIONER

## 2025-04-14 RX ORDER — DEXAMETHASONE SODIUM PHOSPHATE 10 MG/ML
10 INJECTION, SOLUTION INTRA-ARTICULAR; INTRALESIONAL; INTRAMUSCULAR; INTRAVENOUS; SOFT TISSUE ONCE
Status: COMPLETED | OUTPATIENT
Start: 2025-04-14 | End: 2025-04-14

## 2025-04-14 RX ADMIN — DEXAMETHASONE SODIUM PHOSPHATE 10 MG: 10 INJECTION, SOLUTION INTRA-ARTICULAR; INTRALESIONAL; INTRAMUSCULAR; INTRAVENOUS; SOFT TISSUE at 16:25

## 2025-04-14 ASSESSMENT — ENCOUNTER SYMPTOMS
TROUBLE SWALLOWING: 0
ABDOMINAL DISTENTION: 0
COUGH: 0
SORE THROAT: 0
EYE DISCHARGE: 0
ABDOMINAL PAIN: 0
COLOR CHANGE: 0
EYE PAIN: 0
CHEST TIGHTNESS: 0
STRIDOR: 0
SHORTNESS OF BREATH: 0
WHEEZING: 0
SINUS PRESSURE: 0

## 2025-04-14 NOTE — PROGRESS NOTES
Medication was administered by Margaret Pfeiffer LPN at 4:26 PM.    Medication: dexamethasone  Amount: 10mg  Route: intramuscular  Site: Dorsogluteal right    Patient tolerated well.  
this patient.    Subjective:   Review of Systems   Constitutional:  Negative for chills, fatigue and fever.   HENT:  Negative for congestion, sinus pressure, sore throat and trouble swallowing.    Eyes:  Negative for pain and discharge.   Respiratory:  Negative for cough, chest tightness, shortness of breath, wheezing and stridor.    Cardiovascular:  Negative for chest pain and palpitations.   Gastrointestinal:  Negative for abdominal distention and abdominal pain.   Genitourinary:  Negative for difficulty urinating, dysuria and hematuria.   Musculoskeletal:  Negative for arthralgias, neck pain and neck stiffness.   Skin:  Positive for rash. Negative for color change.   Neurological:  Negative for dizziness, syncope, speech difficulty, weakness and numbness.   Psychiatric/Behavioral:  Negative for confusion and suicidal ideas.        Objective    Physical Exam  Vitals and nursing note reviewed.   Constitutional:       General: She is not in acute distress.     Appearance: Normal appearance.   HENT:      Head: Normocephalic.        Comments: Mild erythema and swelling noted around left eye, few red raised bump on the left side of face      Right Ear: External ear normal.      Left Ear: External ear normal.      Nose: Nose normal. No congestion.      Mouth/Throat:      Mouth: Mucous membranes are moist.      Pharynx: Oropharynx is clear. No posterior oropharyngeal erythema.   Eyes:      Conjunctiva/sclera: Conjunctivae normal.      Pupils: Pupils are equal, round, and reactive to light.   Cardiovascular:      Rate and Rhythm: Normal rate and regular rhythm.      Pulses: Normal pulses.      Heart sounds: Normal heart sounds. No murmur heard.  Pulmonary:      Effort: Pulmonary effort is normal. No respiratory distress.      Breath sounds: Normal breath sounds. No stridor. No wheezing.   Abdominal:      General: Abdomen is flat. Bowel sounds are normal. There is no distension.      Tenderness: There is no abdominal

## 2025-04-14 NOTE — PATIENT INSTRUCTIONS
Dex IM in office  May take Benadryl otc  Follow-up with PCP as needed  Go to ER for worrisome symptoms    Patient verbalized understanding and agrees to plan.

## 2025-04-21 ENCOUNTER — OFFICE VISIT (OUTPATIENT)
Dept: PRIMARY CARE CLINIC | Age: 55
End: 2025-04-21
Payer: COMMERCIAL

## 2025-04-21 VITALS
OXYGEN SATURATION: 98 % | DIASTOLIC BLOOD PRESSURE: 82 MMHG | WEIGHT: 180 LBS | TEMPERATURE: 97.8 F | HEART RATE: 94 BPM | BODY MASS INDEX: 27.37 KG/M2 | SYSTOLIC BLOOD PRESSURE: 130 MMHG

## 2025-04-21 DIAGNOSIS — L25.5 CONTACT DERMATITIS DUE TO PLANTS, EXCEPT FOOD, UNSPECIFIED CONTACT DERMATITIS TYPE: Primary | ICD-10-CM

## 2025-04-21 PROCEDURE — 99213 OFFICE O/P EST LOW 20 MIN: CPT | Performed by: NURSE PRACTITIONER

## 2025-04-21 RX ORDER — METHYLPREDNISOLONE 4 MG/1
TABLET ORAL
Qty: 1 KIT | Refills: 0 | Status: SHIPPED | OUTPATIENT
Start: 2025-04-21 | End: 2025-04-27

## 2025-04-21 ASSESSMENT — ENCOUNTER SYMPTOMS
COLOR CHANGE: 0
WHEEZING: 0
TROUBLE SWALLOWING: 0
EYE PAIN: 0
CHEST TIGHTNESS: 0
EYE DISCHARGE: 0
ABDOMINAL PAIN: 0
STRIDOR: 0
SINUS PRESSURE: 0
ABDOMINAL DISTENTION: 0
SORE THROAT: 0
COUGH: 0
SHORTNESS OF BREATH: 0

## 2025-04-21 NOTE — PATIENT INSTRUCTIONS
Cannot do another steroid injection at this time due to have one a few days ago  Medrol sent  Continue OTC allergy medication and steroid cream  Follow-up with PCP as needed  Go to ER for worrisome symptoms    Patient verbalized understanding and agrees to plan.

## 2025-04-21 NOTE — PROGRESS NOTES
[facility]  [facility] [address]  [address]  [place] [address]  Dept: [phone]  Dept Fax: [phone]  Loc: [phone]    Ivis Noland is a 54 y.o. female who presents today for her medical conditions/complaints as noted below.  Ivis Noland is complaining of Rash (Was seen 10 days ago, has gotten worse and spread)        HPI:   HPI    Ivis presents to the office complaining of a rash.  Symptoms started a few days ago.  Patient believes it is poison ivy.  Denies pain and fever.  Reports itching.  Patient had 10 of dex IM on 4/14 for posion ivy on the face.  Patient states rash on face went away but now it is on her abdomen and groin.        Past Medical History:   Diagnosis Date    ADHD (attention deficit hyperactivity disorder)     Anxiety     Cancer (HCC)     cervical    Depression        Past Surgical History:   Procedure Laterality Date    CATHETER REMOVAL N/A 12/30/2015    PORT REMOVAL performed by Leola Saunders MD at [facility] OR    HYSTERECTOMY      TUNNELED VENOUS PORT PLACEMENT         No family history on file.    Social History     Tobacco Use    Smoking status: Former    Smokeless tobacco: Never   Substance Use Topics    Alcohol use: No        Current Outpatient Medications   Medication Sig Dispense Refill    methylPREDNISolone (MEDROL DOSEPACK) 4 MG tablet Take by mouth. 1 kit 0    ergocalciferol (DRISDOL) 01506 UNITS capsule Take 1 capsule by mouth once a week 4 capsule 3    Fexofenadine-Pseudoephedrine (ALLEGRA-D 24 HOUR PO) Take 1 tablet by mouth daily as needed      sertraline (ZOLOFT) 100 MG tablet Take 1 tablet by mouth daily      traZODone (DESYREL) 150 MG tablet Take 1 tablet by mouth nightly       No current facility-administered medications for this visit.       No Known Allergies    Health Maintenance   Topic Date Due    Depression Screen  Never done    HIV screen  Never done    Hepatitis C screen  Never done    Hepatitis B vaccine (1

## 2025-06-15 NOTE — PROGRESS NOTES
[facility]  HEMATOLOGY & ONCOLOGY    Cancer Staging Information:  No matching staging information was found for the patient.      Subjective     VISIT DIAGNOSIS:   No diagnosis found.    REASON FOR VISIT:     Chief Complaint   Patient presents with   • Cervical Cancer     HERE FOR FU, NO COMPLAINTS         HEMATOLOGY / ONCOLOGY HISTORY:      Cervical cancer (CMS/HCC)     Initial Diagnosis     Cervical cancer      2013 Surgery     Patient Name: YESI BOWMAN  MR#: [id number]  : 1970  Gender: F   Ordering Physician: Ning Hernandez M.D.  Copy To:    Accession #: [id number]  Location: [address] [place] [address]  Encounter #: [id number]  Collected: 2013  Received: 2013  Reported: 2013    Clinical Diagnosis and History  Pre-operative diagnosis: Menorrhagia.        Final Diagnosis  Cervical biopsy:        Atypical papillary neoplastic structure, favor  adenocarcinoma (see microscopic description).     Electronically Signed Out By  Kit Worrell M.D.      2014 - 2014 Radiation     Radiation OncologyTreatment Course:  Yesi Bowman received 5040 cGy in 28 fractions to cervix and 3000 cGy via HDRs x 5.        2014 Procedure     Pap Smear:  OTHER INTERPRETATIONS:  Reactive changes associated with radiation.    FINAL CYTOLOGIC DIAGNOSIS:  Negative for Intraepithelial lesions or malignancy      2014 Imaging     CT Abd/Pelvis:  IMPRESSION-  1. No CT evidence of neoplastic disease.  2. 1.0 cm nodular pleural thickening, right lung base posteromedially  near the spine, stable compared to the 2013 examination.  3. Changes from hysterectomy. Mild fluid or induration of the presacral  fat, likely related to previous therapy.  4. Mild circumferential bowel wall prominence of the sigmoid colon which  may be artifactual related to lack of distention. Hypertrophy related to  mild diverticulosis may also be considered. Minimal colitis changes  would be difficult to totally exclude,  however there are no secondary  signs of inflammation. Correlate with patient's symptomatology.  5. Right-sided sacralization of the L5 vertebral body      11/12/2014 Imaging     CT Abd/Pelvis:  IMPRESSION-   1. Diverticulosis. No acute intra-abdominal or pelvic abnormalities  identified.   2. Incidental note is made of sacralization of the right side of L5.    CT Chest:  IMPRESSION- Negative enhanced CT of the chest.          12/30/2014 Imaging     CT Abd/Pelvis:  IMPRESSION-  1. Changes from hysterectomy. Mild induration of the presacral and  perirectal fat with a small amount of fluid in the presacral space,  unchanged. Suspect posttherapy in nature.  2. Mildly prominent pelvic lymph node, left iliac chain. Metastatic  lymphadenopathy not excluded. PET CT characterization suggested if  clinically indicated.  3. No CT evidence of an acute intra-abdominal/pelvic pathological  process, otherwise.          12/31/2014 Imaging     PET/CT:  IMPRESSION-  1. Mild increased PET scan activity identified in the left pelvic lymph  node with a maximum intensity of 1.3 SUV, likely reactive, not typical  of neoplastic disease.  2. Hypermetabolic change in the cervix compatible with the patient's  history of cervical cancer.  3. No evidence of distal metastases      4/10/2015 Procedure     Pap Smear:  OTHER INTERPRETATIONS:  Reactive changes associated with radiation.  Inflammation.  Metaplasia.    FINAL CYTOLOGIC DIAGNOSIS:  Negative for Intraepithelial lesions or malignancy      11/11/2015 Imaging     CT Chest:  IMPRESSION-   1. Unremarkable CT of the chest.    CT Abd/Pelvis:      IMPRESSION-   1. No evidence of local recurrence or metastasis.   2. Soft tissue density overlying the RIGHT gluteus muscle. This is  likely from a recent injection. If no recent injection or trauma has  occurred, and ultrasound of this area could be obtained to assess for a  solid lesion.          2/15/2016 Procedure     Pap Smear:  OTHER  INTERPRETATIONS:                           Reactive changes associated with radiation.                           Metaplasia.                                                      FINAL CYTOLOGIC DIAGNOSIS:                           Negative for Intraepithelial lesions or malignancy      4/12/2016 Imaging     CT Abd/Pelvis:  IMPRESSION-   Prior hysterectomy. Thickening of the wall of the sigmoid and rectum. No  other abnormalities are identified.          9/2/2016 Imaging     CT Abd/Pelvis:  Summary-  1. Acute appendicitis.      11/7/2016 Procedure     Pap Smear:  Interpretation   Negative for intraepithelial lesion or malignancy           3/3/2017 Procedure     Pap Smear:  Interpretation   Negative for intraepithelial lesion or malignancy           9/20/2017 Procedure     Pap Smear:  Interpretation   Negative for intraepithelial lesion or malignancy           2/15/2018 Procedure     Pap Smear:  Interpretation   Negative for intraepithelial lesion or malignancy                  INTERVAL HISTORY  Patient ID: Yesi Bowman is a 49 y.o. year old female stage II cervical ca. Chronic dyspareunia due to stenosis, no dysuria, hematuria, overt bleeding or mass noted. She has not been to a gynecologist in a long time.  4/11/19: reports occasional bleeding per anus. She has a blister perianal that disappeared on it own.    10/10/19: having lower back pain for a while now. No ppt evet. Painful to walk. She had a difficult time getting on the exam table. Tender to palpation of midback to left buttock area.  Pt did not present for MRI and never followed up. Back pain resolved.  At today's visit, no issues or concerns.  - denies weight loss, night sweats, fever, MICHEAL, n/v, abdominal pain.  -rest of ros unremarkable.       Past Medical History:   Past Medical History:   Diagnosis Date   • Alcoholism (CMS/HCC)    • Anemia    • Cervical cancer (CMS/HCC)    • Depression    • Drug therapy    • History of radiation therapy     5040 cGy  to cervix + 3000 cGy (HDRs x 5)   • History of transfusion    • Hx of colonic polyps    • Hx of radiation therapy    • Substance abuse (CMS/HCC)      Past Surgical History:   Past Surgical History:   Procedure Laterality Date   • APPENDECTOMY     • CERVICAL BIOPSY     •  SECTION     • CHOLECYSTECTOMY WITH INTRAOPERATIVE CHOLANGIOGRAM N/A 3/9/2018    Procedure: CHOLECYSTECTOMY LAPAROSCOPIC ;  Surgeon: Karla Lee MD;  Location: [facility] OR;  Service:    • COLONOSCOPY  2016    Hyperplastic polyp at 20 cm repeat exam in 5 years   • COLONOSCOPY N/A 2019    Procedure: COLONOSCOPY WITH ANESTHESIA;  Surgeon: Kiran Gonzalez MD;  Location: [facility] ENDOSCOPY;  Service: Gastroenterology   • CYSTOSCOPY      Diagnostic   • DILATATION AND CURETTAGE     • ENDOSCOPY N/A 2019    Procedure: ESOPHAGOGASTRODUODENOSCOPY WITH ANESTHESIA;  Surgeon: Kiran Gonzalez MD;  Location: [facility] ENDOSCOPY;  Service: Gastroenterology   • HYSTERECTOMY     • KNEE SURGERY     • OOPHORECTOMY     • PELVIC EXAMINATION UNDER ANESTHESIA     • TONSILLECTOMY     • TOTAL LAPAROSCOPIC HYSTERECTOMY WITH DAVINCI ROBOT       Social History:   Social History     Socioeconomic History   • Marital status:      Spouse name: Not on file   • Number of children: Not on file   • Years of education: Not on file   • Highest education level: Not on file   Tobacco Use   • Smoking status: Former Smoker     Packs/day: 0.50     Types: Cigarettes     Last attempt to quit: 2016     Years since quitting: 3.4   • Smokeless tobacco: Never Used   Substance and Sexual Activity   • Alcohol use: No   • Drug use: No   • Sexual activity: Never     Family History:   Family History   Problem Relation Age of Onset   • Skin cancer Mother    • Cancer Maternal Grandmother         Malignancy   • Cancer Paternal Grandmother         Malignancy   • Colon cancer Neg Hx    • Colon polyps Neg Hx        Review of Systems   Constitutional: Negative.    HENT:  "Negative.    Eyes: Negative.    Respiratory: Negative.    Cardiovascular: Negative.    Gastrointestinal: Negative.    Endocrine: Negative.    Genitourinary: Negative.    Musculoskeletal: Positive for back pain.   Skin: Negative.    Allergic/Immunologic: Negative.    Neurological: Negative.    Hematological: Negative.    Psychiatric/Behavioral: Negative.         Performance Status:  Asymptomatic    Medications:    Current Outpatient Medications   Medication Sig Dispense Refill   • cholecalciferol (VITAMIN D3) 1000 units tablet Take 1,000 Units by mouth Daily.     • Omega-3 Fatty Acids (FISH OIL) 1000 MG capsule capsule Take 1,000 mg by mouth Daily With Breakfast.     • sertraline (ZOLOFT) 100 MG tablet Take 100 mg by mouth Daily.     • traZODone (DESYREL) 150 MG tablet Take 50 mg by mouth Every Night.     • omeprazole (priLOSEC) 40 MG capsule TAKE 1 CAPSULE BY MOUTH DAILY. 90 capsule 0   • ondansetron ODT (ZOFRAN ODT) 4 MG disintegrating tablet Take 1 tablet by mouth Every 8 (Eight) Hours As Needed for Nausea. 20 tablet 0   • sodium-potassium-magnesium sulfates (SUPREP BOWEL PREP KIT) 17.5-3.13-1.6 GM/177ML solution oral solution Take as directed per office 2 bottle 0     No current facility-administered medications for this visit.        ALLERGIES:  No Known Allergies    Objective      Vitals:    06/11/20 1417   BP: 115/65   Pulse: 69   Resp: 18   Temp: 97 °F (36.1 °C)   SpO2: 98%   Weight: 87.5 kg (193 lb)   Height: 172.7 cm (68\")   PainSc: 0-No pain         Current Status 6/11/2020   ECOG score 0         Physical Examremains the same  General Appearance: Patient is awake, alert, oriented and in no acute distress. Patient is welldeveloped, wellnourished, and appears stated age.  HEENT: Normocephalic. Sclerae clear, conjunctiva pink, extraocular movements intact, pupils, round, reactive to light and  accommodation. Mouth and throat are clear with moist oral mucosa.  NECK: Supple, no jugular venous distention, " thyroid not enlarged.  LYMPH: No cervical, supraclavicular, axillary, or inguinal lymphadenopathy.  CHEST: Equal bilateral expansion, AP  diameter normal, resonant percussion note  LUNGS: Good air movement, no rales, rhonchi, rubs or wheezes with auscultation  CARDIO: Regular sinus rhythm, no murmurs, gallops or rubs.  ABDOMEN: Nondistended, soft, No tenderness, no guarding, no rebound, No hepatosplenomegaly. No abdominal masses. Bowel sounds positive. No hernia  GENITALIA: Not examined.  BREASTS: Not examined.  MUSKEL: No joint swelling, decreased motion, or inflammation  EXTREMS: No edema, clubbing, cyanosis, No varicose veins.  to palpation.  NEURO: Grossly nonfocal, Gait is coordinated and smooth, Cognition is preserved.  SKIN: No rashes, no ecchymoses, no petechia.  PSYCH: Oriented to time, place and person. Memory is preserved. Mood and affect appear normal    RECENT LABS:  Lab on 06/11/2020   Component Date Value Ref Range Status   • Glucose 06/11/2020 87  65 - 99 mg/dL Final   • BUN 06/11/2020 15  6 - 20 mg/dL Final   • Creatinine 06/11/2020 0.74  0.57 - 1.00 mg/dL Final   • Sodium 06/11/2020 145  136 - 145 mmol/L Final   • Potassium 06/11/2020 4.0  3.5 - 5.2 mmol/L Final   • Chloride 06/11/2020 107  98 - 107 mmol/L Final   • CO2 06/11/2020 27.0  22.0 - 29.0 mmol/L Final   • Calcium 06/11/2020 9.9  8.6 - 10.5 mg/dL Final   • Total Protein 06/11/2020 7.0  6.0 - 8.5 g/dL Final   • Albumin 06/11/2020 4.60  3.50 - 5.20 g/dL Final   • ALT (SGPT) 06/11/2020 <5  1 - 33 U/L Final   • AST (SGOT) 06/11/2020 10  1 - 32 U/L Final   • Alkaline Phosphatase 06/11/2020 50  39 - 117 U/L Final   • Total Bilirubin 06/11/2020 0.3  0.2 - 1.2 mg/dL Final   • eGFR Non African Amer 06/11/2020 84  >60 mL/min/1.73 Final   • Globulin 06/11/2020 2.4  gm/dL Final   • A/G Ratio 06/11/2020 1.9  g/dL Final   • BUN/Creatinine Ratio 06/11/2020 20.3  7.0 - 25.0 Final   • Anion Gap 06/11/2020 11.0  5.0 - 15.0 mmol/L Final   • WBC  06/11/2020 7.30  3.40 - 10.80 10*3/mm3 Final   • RBC 06/11/2020 4.03  3.77 - 5.28 10*6/mm3 Final   • Hemoglobin 06/11/2020 12.1  12.0 - 15.9 g/dL Final   • Hematocrit 06/11/2020 36.9  34.0 - 46.6 % Final   • MCV 06/11/2020 91.6  79.0 - 97.0 fL Final   • MCH 06/11/2020 30.0  26.6 - 33.0 pg Final   • MCHC 06/11/2020 32.8  31.5 - 35.7 g/dL Final   • RDW 06/11/2020 13.8  12.3 - 15.4 % Final   • RDW-SD 06/11/2020 47.2  37.0 - 54.0 fl Final   • MPV 06/11/2020 9.6  6.0 - 12.0 fL Final   • Platelets 06/11/2020 201  140 - 450 10*3/mm3 Final   • Neutrophil % 06/11/2020 55.2  42.7 - 76.0 % Final   • Lymphocyte % 06/11/2020 34.5  19.6 - 45.3 % Final   • Monocyte % 06/11/2020 8.2  5.0 - 12.0 % Final   • Eosinophil % 06/11/2020 1.2  0.3 - 6.2 % Final   • Basophil % 06/11/2020 0.5  0.0 - 1.5 % Final   • Immature Grans % 06/11/2020 0.4  0.0 - 0.5 % Final   • Neutrophils, Absolute 06/11/2020 4.02  1.70 - 7.00 10*3/mm3 Final   • Lymphocytes, Absolute 06/11/2020 2.52  0.70 - 3.10 10*3/mm3 Final   • Monocytes, Absolute 06/11/2020 0.60  0.10 - 0.90 10*3/mm3 Final   • Eosinophils, Absolute 06/11/2020 0.09  0.00 - 0.40 10*3/mm3 Final   • Basophils, Absolute 06/11/2020 0.04  0.00 - 0.20 10*3/mm3 Final   • Immature Grans, Absolute 06/11/2020 0.03  0.00 - 0.05 10*3/mm3 Final   • nRBC 06/11/2020 0.0  0.0 - 0.2 /100 WBC Final       RADIOLOGY:  No results found.         Assessment/Plan  Yesi Bowman is a 49 y.o. year old female stage IIb well differentiated cervical adenocarcinoma who is status post radical robotic hysterectomy and bilateral pelvic lymphadenectomy followed by adjuvant radiation therapy to the pelvis + cisplatin chemo as a radiosensitizer. She is currently DM.   1. Advised yearly gyne exam.--reiterated the importance of gyne f/u.she sees Dr Barrios and he does cervical exam.  --labs reviewed with pt cr 0.6, lft normal, alk phos nl, wbc 5.17, Hg 12.4, plt 214.  -lower back and pelvic pain >2 weeks. No aggrevating event. Pt did  not have MRI done and did not f/u. Today lower back pain resolved.  -labs reviewed with pt Clinically DM  2. Depression: on zoloft  3. Chronic pain: On trazodone  4. ADHD: on atomoxitine    Patient Active Problem List   Diagnosis   • Cervical cancer (CMS/HCC)   • History of radiation therapy   • Former smoker   • Encounter for follow-up surveillance of cervical cancer   • Generalized bloating   • Pap smear of vagina with previous hysterectomy for cancer   • Hx of cervical cancer   • Diarrhea   • Bloating   • Generalized abdominal pain   • Nausea and vomiting   • Obesity, unspecified obesity severity, unspecified obesity type   • Nonsmoker   • Papanicolaou smear   • Ear fullness, bilateral   • Periumbilical abdominal pain   • Hyperplastic colonic polyp            Danyell Ramey MD    6/11/2020    14:22   Normal for race

## 2025-07-08 DIAGNOSIS — D64.9 ANEMIA, UNSPECIFIED TYPE: Primary | ICD-10-CM

## 2025-07-11 ENCOUNTER — OFFICE VISIT (OUTPATIENT)
Age: 55
End: 2025-07-11

## 2025-07-11 VITALS
DIASTOLIC BLOOD PRESSURE: 78 MMHG | HEART RATE: 88 BPM | BODY MASS INDEX: 28.28 KG/M2 | OXYGEN SATURATION: 98 % | WEIGHT: 186 LBS | SYSTOLIC BLOOD PRESSURE: 126 MMHG | TEMPERATURE: 97.8 F

## 2025-07-11 DIAGNOSIS — L25.5 CONTACT DERMATITIS DUE TO PLANTS, EXCEPT FOOD, UNSPECIFIED CONTACT DERMATITIS TYPE: Primary | ICD-10-CM

## 2025-07-11 RX ORDER — METHYLPREDNISOLONE 4 MG/1
TABLET ORAL
Qty: 1 KIT | Refills: 0 | Status: SHIPPED | OUTPATIENT
Start: 2025-07-11 | End: 2025-07-17

## 2025-07-11 RX ORDER — DEXAMETHASONE SODIUM PHOSPHATE 10 MG/ML
8 INJECTION, SOLUTION INTRA-ARTICULAR; INTRALESIONAL; INTRAMUSCULAR; INTRAVENOUS; SOFT TISSUE ONCE
Status: COMPLETED | OUTPATIENT
Start: 2025-07-11 | End: 2025-07-11

## 2025-07-11 RX ADMIN — DEXAMETHASONE SODIUM PHOSPHATE 8 MG: 10 INJECTION, SOLUTION INTRA-ARTICULAR; INTRALESIONAL; INTRAMUSCULAR; INTRAVENOUS; SOFT TISSUE at 13:37

## 2025-07-11 ASSESSMENT — ENCOUNTER SYMPTOMS
CHEST TIGHTNESS: 0
COUGH: 0
EYE DISCHARGE: 0
STRIDOR: 0
ABDOMINAL PAIN: 0
TROUBLE SWALLOWING: 0
SHORTNESS OF BREATH: 0
SINUS PRESSURE: 0
SORE THROAT: 0
EYE PAIN: 0
COLOR CHANGE: 0
WHEEZING: 0
ABDOMINAL DISTENTION: 0

## 2025-07-11 NOTE — PROGRESS NOTES
After obtaining consent, and per orders of Ananya MAYFIELD, injection of decadron given in Right upper quad. gluteus by Naomi Garcia MA. Patient instructed to remain in clinic for 20 minutes afterwards, and to report any adverse reaction to me immediately.    
DOSEPACK) 4 MG TABLET    Take by mouth.        No follow-ups on file.         Discussed use, benefits, and side effects of any prescribed medications. All patient questions were answered. Patient voiced understanding of care plan.   Patient was given educational materials - see patient instructions below.     Patient Instructions   Dex IM sent  Medrol sent to start tomorrow  Follow-up with PCP as needed  Go to ER for worrisome symptoms    Patient verbalized understanding and agrees to plan.       Electronically signed by CHAPARRO Bolden CNP on 7/11/2025 at 1:32 PM

## 2025-07-11 NOTE — PATIENT INSTRUCTIONS
Dex IM sent  Medrol sent to start tomorrow  Follow-up with PCP as needed  Go to ER for worrisome symptoms    Patient verbalized understanding and agrees to plan.

## 2025-07-14 ENCOUNTER — LAB (OUTPATIENT)
Dept: LAB | Facility: HOSPITAL | Age: 55
End: 2025-07-14
Payer: COMMERCIAL

## 2025-07-14 DIAGNOSIS — D64.9 ANEMIA, UNSPECIFIED TYPE: ICD-10-CM

## 2025-07-14 LAB
ALBUMIN SERPL-MCNC: 4.6 G/DL (ref 3.5–5.2)
ALBUMIN/GLOB SERPL: 1.7 G/DL
ALP SERPL-CCNC: 54 U/L (ref 39–117)
ALT SERPL W P-5'-P-CCNC: 7 U/L (ref 1–33)
ANION GAP SERPL CALCULATED.3IONS-SCNC: 12 MMOL/L (ref 5–15)
AST SERPL-CCNC: 10 U/L (ref 1–32)
BASOPHILS # BLD AUTO: 0.04 10*3/MM3 (ref 0–0.2)
BASOPHILS NFR BLD AUTO: 0.5 % (ref 0–1.5)
BILIRUB SERPL-MCNC: 0.4 MG/DL (ref 0–1.2)
BUN SERPL-MCNC: 10.6 MG/DL (ref 6–20)
BUN/CREAT SERPL: 16.3 (ref 7–25)
CALCIUM SPEC-SCNC: 9.6 MG/DL (ref 8.6–10.5)
CHLORIDE SERPL-SCNC: 102 MMOL/L (ref 98–107)
CO2 SERPL-SCNC: 25 MMOL/L (ref 22–29)
CREAT SERPL-MCNC: 0.65 MG/DL (ref 0.57–1)
DEPRECATED RDW RBC AUTO: 46.8 FL (ref 37–54)
EGFRCR SERPLBLD CKD-EPI 2021: 106.1 ML/MIN/1.73
EOSINOPHIL # BLD AUTO: 0.02 10*3/MM3 (ref 0–0.4)
EOSINOPHIL NFR BLD AUTO: 0.2 % (ref 0.3–6.2)
ERYTHROCYTE [DISTWIDTH] IN BLOOD BY AUTOMATED COUNT: 13.4 % (ref 12.3–15.4)
GLOBULIN UR ELPH-MCNC: 2.7 GM/DL
GLUCOSE SERPL-MCNC: 109 MG/DL (ref 65–99)
HCT VFR BLD AUTO: 39.3 % (ref 34–46.6)
HGB BLD-MCNC: 12.5 G/DL (ref 12–15.9)
IMM GRANULOCYTES # BLD AUTO: 0.02 10*3/MM3 (ref 0–0.05)
IMM GRANULOCYTES NFR BLD AUTO: 0.2 % (ref 0–0.5)
LYMPHOCYTES # BLD AUTO: 1.52 10*3/MM3 (ref 0.7–3.1)
LYMPHOCYTES NFR BLD AUTO: 18 % (ref 19.6–45.3)
MCH RBC QN AUTO: 30.1 PG (ref 26.6–33)
MCHC RBC AUTO-ENTMCNC: 31.8 G/DL (ref 31.5–35.7)
MCV RBC AUTO: 94.7 FL (ref 79–97)
MONOCYTES # BLD AUTO: 0.44 10*3/MM3 (ref 0.1–0.9)
MONOCYTES NFR BLD AUTO: 5.2 % (ref 5–12)
NEUTROPHILS NFR BLD AUTO: 6.4 10*3/MM3 (ref 1.7–7)
NEUTROPHILS NFR BLD AUTO: 75.9 % (ref 42.7–76)
NRBC BLD AUTO-RTO: 0 /100 WBC (ref 0–0.2)
PLATELET # BLD AUTO: 221 10*3/MM3 (ref 140–450)
PMV BLD AUTO: 9.2 FL (ref 6–12)
POTASSIUM SERPL-SCNC: 4 MMOL/L (ref 3.5–5.2)
PROT SERPL-MCNC: 7.3 G/DL (ref 6–8.5)
RBC # BLD AUTO: 4.15 10*6/MM3 (ref 3.77–5.28)
SODIUM SERPL-SCNC: 139 MMOL/L (ref 136–145)
WBC NRBC COR # BLD AUTO: 8.44 10*3/MM3 (ref 3.4–10.8)

## 2025-07-14 PROCEDURE — 80053 COMPREHEN METABOLIC PANEL: CPT

## 2025-07-14 PROCEDURE — 36415 COLL VENOUS BLD VENIPUNCTURE: CPT

## 2025-07-14 PROCEDURE — 85025 COMPLETE CBC W/AUTO DIFF WBC: CPT

## (undated) DEVICE — GLV SURG BIOGEL LTX PF 6 1/2

## (undated) DEVICE — PAD GRND REM POLYHESIVE A/ DISP

## (undated) DEVICE — ENDOPATH XCEL WITH OPTIVIEW TECHNOLOGY UNIVERSAL TROCAR STABILITY SLEEVES: Brand: ENDOPATH XCEL OPTIVIEW

## (undated) DEVICE — CUFF,BP,DISP,1 TUBE,ADULT,HP: Brand: MEDLINE

## (undated) DEVICE — SENSR O2 OXIMAX FNGR A/ 18IN NONSTR

## (undated) DEVICE — CONMED SCOPE SAVER BITE BLOCK, 20X27 MM: Brand: SCOPE SAVER

## (undated) DEVICE — 3M™ STERI-STRIP™ REINFORCED ADHESIVE SKIN CLOSURES, R1547, 1/2 IN X 4 IN (12 MM X 100 MM), 6 STRIPS/ENVELOPE: Brand: 3M™ STERI-STRIP™

## (undated) DEVICE — ENDOPATH XCEL DILATING TIP TROCARS WITH STABILITY SLEEVES: Brand: ENDOPATH XCEL

## (undated) DEVICE — MINI ENDOCUT SCISSOR TIP, DISPOSABLE: Brand: RENEW

## (undated) DEVICE — FRCP BIOP COLD ENDOJAW ALLGTR W/NDL 2.8X2300MM BLU

## (undated) DEVICE — SUT VIC 0 SUTUPAK TIES 18IN J906G

## (undated) DEVICE — Device: Brand: DEFENDO AIR/WATER/SUCTION AND BIOPSY VALVE

## (undated) DEVICE — YANKAUER,BULB TIP WITH VENT: Brand: ARGYLE

## (undated) DEVICE — FRCP BIOP ENDO CAPTURAPRO SPK SERR 2.8MM 230CM

## (undated) DEVICE — ANTIBACTERIAL UNDYED BRAIDED (POLYGLACTIN 910), SYNTHETIC ABSORBABLE SUTURE: Brand: COATED VICRYL

## (undated) DEVICE — THE CHANNEL CLEANING BRUSH IS A NYLON FLEXI BRUSH ATTACHED TO A FLEXIBLE PLASTIC SHEATH DESIGNED TO SAFELY REMOVE DEBRIS FROM FLEXIBLE ENDOSCOPES.

## (undated) DEVICE — ENDOPATH XCEL WITH OPTIVIEW TECHNOLOGY DILATING TIP TROCARS WITH STABILITY SLEEVES: Brand: ENDOPATH XCEL OPTIVIEW

## (undated) DEVICE — CLIP APPLIER: Brand: ENDO CLIP

## (undated) DEVICE — ENDOPATH PNEUMONEEDLE INSUFFLATION NEEDLES WITH LUER LOCK CONNECTORS 120MM: Brand: ENDOPATH

## (undated) DEVICE — ENDOPOUCH RETRIEVER SPECIMEN RETRIEVAL BAGS: Brand: ENDOPOUCH RETRIEVER

## (undated) DEVICE — ELECTRD L HK EZ CLN 33CM

## (undated) DEVICE — PAD LAP CHOLE: Brand: MEDLINE INDUSTRIES, INC.

## (undated) DEVICE — TBG SMPL FLTR LINE NASL 02/C02 A/ BX/100

## (undated) DEVICE — ENDOGATOR AUXILIARY WATER JET CONNECTOR: Brand: ENDOGATOR

## (undated) DEVICE — 2, DISPOSABLE SUCTION/IRRIGATOR WITHOUT DISPOSABLE TIP: Brand: STRYKEFLOW

## (undated) DEVICE — TOWEL,OR,DSP,ST,BLUE,STD,4/PK,20PK/CS: Brand: MEDLINE

## (undated) DEVICE — PK TURNOVER RM ADV

## (undated) DEVICE — THE SINGLE USE ETRAP – POLYP TRAP IS USED FOR SUCTION RETRIEVAL OF ENDOSCOPICALLY REMOVED POLYPS.: Brand: ETRAP

## (undated) DEVICE — SNAR POLYP SENSATION MICRO OVL 13 240X40

## (undated) DEVICE — TRY PREP SCRB VAG PVP

## (undated) DEVICE — ELECTRD BLD EDGE/INSUL1P 2.4X5.1MM STRL

## (undated) DEVICE — MASK,OXYGEN,MED CONC,ADLT,7' TUB, UC: Brand: PENDING